# Patient Record
Sex: MALE | Race: WHITE | NOT HISPANIC OR LATINO | Employment: FULL TIME | URBAN - METROPOLITAN AREA
[De-identification: names, ages, dates, MRNs, and addresses within clinical notes are randomized per-mention and may not be internally consistent; named-entity substitution may affect disease eponyms.]

---

## 2017-02-20 ENCOUNTER — TRANSCRIBE ORDERS (OUTPATIENT)
Dept: ADMINISTRATIVE | Facility: HOSPITAL | Age: 62
End: 2017-02-20

## 2017-02-20 DIAGNOSIS — Z71.89 CPAP USE COUNSELING: Primary | ICD-10-CM

## 2017-03-17 ENCOUNTER — HOSPITAL ENCOUNTER (OUTPATIENT)
Dept: SLEEP CENTER | Facility: CLINIC | Age: 62
Discharge: HOME | End: 2017-03-17
Payer: COMMERCIAL

## 2017-03-17 ENCOUNTER — TRANSCRIBE ORDERS (OUTPATIENT)
Dept: SLEEP CENTER | Facility: CLINIC | Age: 62
End: 2017-03-17

## 2017-03-17 DIAGNOSIS — Z71.89 CPAP USE COUNSELING: ICD-10-CM

## 2017-03-17 DIAGNOSIS — G47.33 OSA (OBSTRUCTIVE SLEEP APNEA): Primary | ICD-10-CM

## 2017-03-23 ENCOUNTER — HOSPITAL ENCOUNTER (OUTPATIENT)
Dept: SLEEP CENTER | Facility: CLINIC | Age: 62
Discharge: HOME | End: 2017-03-23
Payer: COMMERCIAL

## 2017-03-23 DIAGNOSIS — G47.33 OSA (OBSTRUCTIVE SLEEP APNEA): ICD-10-CM

## 2017-03-23 PROCEDURE — 95811 POLYSOM 6/>YRS CPAP 4/> PARM: CPT

## 2017-04-03 ENCOUNTER — HOSPITAL ENCOUNTER (OUTPATIENT)
Dept: SLEEP CENTER | Facility: CLINIC | Age: 62
Discharge: HOME | End: 2017-04-03
Payer: COMMERCIAL

## 2017-04-03 DIAGNOSIS — G47.33 OSA (OBSTRUCTIVE SLEEP APNEA): ICD-10-CM

## 2017-05-12 ENCOUNTER — GENERIC CONVERSION - ENCOUNTER (OUTPATIENT)
Dept: OTHER | Facility: OTHER | Age: 62
End: 2017-05-12

## 2017-05-31 ENCOUNTER — ALLSCRIPTS OFFICE VISIT (OUTPATIENT)
Dept: OTHER | Facility: OTHER | Age: 62
End: 2017-05-31

## 2017-06-16 ENCOUNTER — HOSPITAL ENCOUNTER (OUTPATIENT)
Dept: SLEEP CENTER | Facility: CLINIC | Age: 62
Discharge: HOME | End: 2017-06-16
Payer: COMMERCIAL

## 2017-06-16 ENCOUNTER — TRANSCRIBE ORDERS (OUTPATIENT)
Dept: SLEEP CENTER | Facility: CLINIC | Age: 62
End: 2017-06-16

## 2017-06-16 DIAGNOSIS — G47.33 OSA AND COPD OVERLAP SYNDROME (HCC): Primary | ICD-10-CM

## 2017-06-16 DIAGNOSIS — J44.9 OSA AND COPD OVERLAP SYNDROME (HCC): Primary | ICD-10-CM

## 2017-06-16 DIAGNOSIS — G47.33 OSA (OBSTRUCTIVE SLEEP APNEA): ICD-10-CM

## 2017-07-07 ENCOUNTER — GENERIC CONVERSION - ENCOUNTER (OUTPATIENT)
Dept: OTHER | Facility: OTHER | Age: 62
End: 2017-07-07

## 2017-07-07 LAB
A/G RATIO (HISTORICAL): 1.4 (ref 1.2–2.2)
ALBUMIN SERPL BCP-MCNC: 4 G/DL (ref 3.6–4.8)
ALP SERPL-CCNC: 53 IU/L (ref 39–117)
ALT SERPL W P-5'-P-CCNC: 48 IU/L (ref 0–44)
AST SERPL W P-5'-P-CCNC: 32 IU/L (ref 0–40)
BASOPHILS # BLD AUTO: 0 %
BASOPHILS # BLD AUTO: 0 X10E3/UL (ref 0–0.2)
BILIRUB SERPL-MCNC: 0.7 MG/DL (ref 0–1.2)
BUN SERPL-MCNC: 16 MG/DL (ref 8–27)
BUN/CREA RATIO (HISTORICAL): 16 (ref 10–24)
CALCIUM SERPL-MCNC: 9.6 MG/DL (ref 8.6–10.2)
CHLORIDE SERPL-SCNC: 104 MMOL/L (ref 96–106)
CHOLEST SERPL-MCNC: 162 MG/DL (ref 100–199)
CHOLEST/HDLC SERPL: 4.2 RATIO UNITS (ref 0–5)
CO2 SERPL-SCNC: 24 MMOL/L (ref 18–29)
CREAT SERPL-MCNC: 0.98 MG/DL (ref 0.76–1.27)
DEPRECATED RDW RBC AUTO: 13.4 % (ref 12.3–15.4)
EGFR AFRICAN AMERICAN (HISTORICAL): 96 ML/MIN/1.73
EGFR-AMERICAN CALC (HISTORICAL): 83 ML/MIN/1.73
EOSINOPHIL # BLD AUTO: 0.2 X10E3/UL (ref 0–0.4)
EOSINOPHIL # BLD AUTO: 4 %
GLUCOSE SERPL-MCNC: 111 MG/DL (ref 65–99)
HCT VFR BLD AUTO: 41.4 % (ref 37.5–51)
HDLC SERPL-MCNC: 39 MG/DL
HGB BLD-MCNC: 14 G/DL (ref 12.6–17.7)
IMM.GRANULOCYTES (CD4/8) (HISTORICAL): 0 %
IMM.GRANULOCYTES (CD4/8) (HISTORICAL): 0 X10E3/UL (ref 0–0.1)
LDLC SERPL CALC-MCNC: 104 MG/DL (ref 0–99)
LYMPHOCYTES # BLD AUTO: 1.7 X10E3/UL (ref 0.7–3.1)
LYMPHOCYTES # BLD AUTO: 39 %
MCH RBC QN AUTO: 32.1 PG (ref 26.6–33)
MCHC RBC AUTO-ENTMCNC: 33.8 G/DL (ref 31.5–35.7)
MCV RBC AUTO: 95 FL (ref 79–97)
MONOCYTES # BLD AUTO: 0.3 X10E3/UL (ref 0.1–0.9)
MONOCYTES (HISTORICAL): 7 %
NEUTROPHILS # BLD AUTO: 2.2 X10E3/UL (ref 1.4–7)
NEUTROPHILS # BLD AUTO: 50 %
PLATELET # BLD AUTO: 190 X10E3/UL (ref 150–379)
POTASSIUM SERPL-SCNC: 5 MMOL/L (ref 3.5–5.2)
RBC (HISTORICAL): 4.36 X10E6/UL (ref 4.14–5.8)
SODIUM SERPL-SCNC: 145 MMOL/L (ref 134–144)
TOT. GLOBULIN, SERUM (HISTORICAL): 2.8 G/DL (ref 1.5–4.5)
TOTAL PROTEIN (HISTORICAL): 6.8 G/DL (ref 6–8.5)
TRIGL SERPL-MCNC: 93 MG/DL (ref 0–149)
VLDLC SERPL CALC-MCNC: 19 MG/DL (ref 5–40)
WBC # BLD AUTO: 4.4 X10E3/UL (ref 3.4–10.8)

## 2017-07-08 LAB
BACTERIA UR QL AUTO: NORMAL
BILIRUB UR QL STRIP: NEGATIVE
COLOR UR: YELLOW
COMMENT (HISTORICAL): CLEAR
FECAL OCCULT BLOOD DIAGNOSTIC (HISTORICAL): NEGATIVE
GLUCOSE (HISTORICAL): NEGATIVE
KETONES UR STRIP-MCNC: NEGATIVE MG/DL
LEUKOCYTE ESTERASE UR QL STRIP: NEGATIVE
MICROSCOPIC EXAMINATION (HISTORICAL): NORMAL
MICROSCOPIC EXAMINATION (HISTORICAL): NORMAL
MUCUS THREADS (HISTORICAL): PRESENT
NITRITE UR QL STRIP: NEGATIVE
NON-SQ EPI CELLS URNS QL MICRO: NORMAL /HPF
PH UR STRIP.AUTO: 5.5 [PH] (ref 5–7.5)
PROT UR STRIP-MCNC: NEGATIVE MG/DL
RBC (HISTORICAL): NORMAL /HPF
SP GR UR STRIP.AUTO: 1.03 (ref 1–1.03)
TSH SERPL DL<=0.05 MIU/L-ACNC: 2.03 UIU/ML (ref 0.45–4.5)
URINALYSIS (UA) (HISTORICAL): NORMAL
UROBILINOGEN UR QL STRIP.AUTO: 0.2 EU/DL (ref 0.2–1)
WBC # BLD AUTO: NORMAL /HPF

## 2017-07-21 ENCOUNTER — TRANSCRIBE ORDERS (OUTPATIENT)
Dept: SLEEP CENTER | Facility: CLINIC | Age: 62
End: 2017-07-21

## 2017-07-21 ENCOUNTER — HOSPITAL ENCOUNTER (OUTPATIENT)
Dept: SLEEP CENTER | Facility: CLINIC | Age: 62
Discharge: HOME | End: 2017-07-21
Payer: COMMERCIAL

## 2017-07-21 DIAGNOSIS — J44.9 OSA AND COPD OVERLAP SYNDROME (HCC): Primary | ICD-10-CM

## 2017-07-21 DIAGNOSIS — J44.9 OSA AND COPD OVERLAP SYNDROME (HCC): ICD-10-CM

## 2017-07-21 DIAGNOSIS — G47.33 OSA AND COPD OVERLAP SYNDROME (HCC): ICD-10-CM

## 2017-07-21 DIAGNOSIS — G47.33 OSA AND COPD OVERLAP SYNDROME (HCC): Primary | ICD-10-CM

## 2017-07-28 ENCOUNTER — ALLSCRIPTS OFFICE VISIT (OUTPATIENT)
Dept: OTHER | Facility: OTHER | Age: 62
End: 2017-07-28

## 2018-01-13 VITALS
DIASTOLIC BLOOD PRESSURE: 88 MMHG | WEIGHT: 274.5 LBS | SYSTOLIC BLOOD PRESSURE: 140 MMHG | BODY MASS INDEX: 39.3 KG/M2 | HEART RATE: 80 BPM | HEIGHT: 70 IN | TEMPERATURE: 99.6 F

## 2018-01-14 VITALS
SYSTOLIC BLOOD PRESSURE: 130 MMHG | WEIGHT: 275 LBS | HEART RATE: 70 BPM | BODY MASS INDEX: 39.37 KG/M2 | DIASTOLIC BLOOD PRESSURE: 80 MMHG | HEIGHT: 70 IN

## 2018-01-17 NOTE — MISCELLANEOUS
Message   Recorded as Task   Date: 05/08/2017 08:57 AM, Created By: Lloyd Hernandez   Task Name: Med Renewal Request   Assigned To: Moshe Metcalf   Regarding Patient: Amadou Barron, Status: In Progress   Comment:    Althea Crowder - 08 May 2017 8:57 AM     TASK CREATED  Caller: Self; Renew Medication; [phone] (Home); [phone] (Work)  Refill Lisinopril 40 mg at [address] - 08 May 2017 9:52 AM     TASK REASSIGNED: Previously Assigned To Jaleel Jeanroshni him to make another appointment he needs to check in  I will give him a month's worth of medicine   James,April - 08 May 2017 9:56 AM     TASK IN PROGRESS   James,April - 08 May 2017 12:24 PM     TASK EDITED  left messages on both home and cell phone   Moshe Metcalf - 08 May 2017 2:39 PM     TASK EDITED  no answer to home number   Moshe Metcalf - 09 May 2017 8:21 AM     TASK EDITED  left detailed message on home number to call out office back and make an appt within the next month, only 1 month supply of medication was sent to pharmacy  James,April - 12 May 2017 10:43 AM     TASK EDITED  At this time patient has not called back to make an appt, Dr Emmy Briggs was informed of this  Detailed messages have been left on patients home and cell phone  Active Problems    1  Acute bilateral low back pain with right-sided sciatica (724 2,724 3,338 19) (M54 41)   2  Allergic rhinitis (477 9) (J30 9)   3  Encounter for prostate cancer screening (V76 44) (Z12 5)   4  Fatty liver (571 8) (K76 0)   5  GERD without esophagitis (530 81) (K21 9)   6  HTN (hypertension) (401 9) (I10)   7  Obesity (278 00) (E66 9)   8  Osteoarthritis of knee (715 36) (M17 10)   9  Sleep apnea (780 57) (G47 30)    Current Meds   1  Aleve TABS; CHEW OR SWALLOW 1 TABLET DAILY Recorded   2  AmLODIPine Besylate 5 MG Oral Tablet; Take 1 tablet daily; Therapy: 44ZKS4114 to (Evaluate:21Jan2017)  Requested for: 99Lzp7109; Last   Rx:[id number] Ordered   3   LamISIL TABS;   Therapy: (Recorded:11Nov2016) to Recorded   4  Lisinopril 40 MG Oral Tablet; Take 1 tablet daily; Therapy: 28OLX4709 to (Evaluate:61Rar3248)  Requested for: 26KUL2204; Last   [id number] Ordered   5  PriLOSEC OTC 20 MG Oral Tablet Delayed Release; TAKE 1 TABLET DAILY; Therapy: (Recorded:26Apr2013) to Recorded    Allergies    1   No Known Drug Allergies    Signatures   Electronically signed by : LIVIA Adan ; May 12 2017 12:16PM EST                       (Author)

## 2018-01-18 NOTE — MISCELLANEOUS
Message   Recorded as Task   Date: 08/27/2016 09:12 AM, Created By: Nader Andujar   Task Name: Follow Up   Assigned To: Marycarmen Rangel   Regarding Patient: Amanda Zamora, Status: Active   Xena Hernández - 27 Aug 2016 9:12 AM     TASK CREATED  Caller: Self; General Medical Question; [phone] (Home); [phone] (Work)  PT CALLED, HAD BEEN DX BY DR BAIG WITH LYME  IS FINISHED ANTIBIOTIC  STILL HAVING SOME JOINT PAIN (NOT SURE IF LYME OR JUST EVERYDAY ACHES AND PAINS) NOT SURE IF SHOULD STILL BE TAKING ANTIBIOTIC  CAN CALL PT [phone]  USES PROFESSIONAL   Marycarmen Rangel - 29 Aug 2016 7:27 AM     TASK EDITED    I spoke with the patient on 08/27/2016  Dr Roberto Easton had seen him and diagnosed the patient with Lyme disease  He just finished a 3 week course of Doxycycline on 08/20/2016  He did improve and no longer has a rash, but still has diffuse achy joints  There is no fever  There is no joint swelling  There is no headache or neurologic symptoms  I advised him that we will continue him on an additional week of the doycycline as ordered and he should follow up in the office if there is no improvmement  Plan    1  Doxycycline Hyclate 100 MG Oral Capsule;  Take 1 capsule twice daily    Signatures   Electronically signed by : Abbott Goodpasture, DO; Aug 29 2016  7:27AM EST                       (Author)

## 2018-01-30 ENCOUNTER — OFFICE VISIT (OUTPATIENT)
Dept: URGENT CARE | Facility: CLINIC | Age: 63
End: 2018-01-30
Payer: COMMERCIAL

## 2018-01-30 VITALS
HEART RATE: 80 BPM | HEIGHT: 70 IN | DIASTOLIC BLOOD PRESSURE: 80 MMHG | SYSTOLIC BLOOD PRESSURE: 127 MMHG | RESPIRATION RATE: 16 BRPM | BODY MASS INDEX: 40.09 KG/M2 | WEIGHT: 280 LBS | OXYGEN SATURATION: 99 % | TEMPERATURE: 98.3 F

## 2018-01-30 DIAGNOSIS — S39.012A STRAIN OF MUSCLE, FASCIA AND TENDON OF LOWER BACK, INITIAL ENCOUNTER: Primary | ICD-10-CM

## 2018-01-30 PROCEDURE — 99213 OFFICE O/P EST LOW 20 MIN: CPT | Performed by: FAMILY MEDICINE

## 2018-01-30 RX ORDER — AMLODIPINE BESYLATE 5 MG/1
1 TABLET ORAL DAILY
COMMUNITY
Start: 2016-07-25 | End: 2018-09-17 | Stop reason: SDUPTHER

## 2018-01-30 RX ORDER — LISINOPRIL 40 MG/1
1 TABLET ORAL DAILY
COMMUNITY
Start: 2016-07-25 | End: 2018-09-17 | Stop reason: SDUPTHER

## 2018-01-30 RX ORDER — DIFLUNISAL 500 MG/1
500 TABLET, FILM COATED ORAL 2 TIMES DAILY
Qty: 20 TABLET | Refills: 0 | Status: SHIPPED | OUTPATIENT
Start: 2018-01-30 | End: 2019-03-15 | Stop reason: ALTCHOICE

## 2018-01-30 RX ORDER — LORATADINE 10 MG/1
1 TABLET ORAL AS NEEDED
COMMUNITY

## 2018-01-30 RX ORDER — NAPROXEN SODIUM 220 MG
1 TABLET ORAL DAILY
COMMUNITY
End: 2022-01-07 | Stop reason: ALTCHOICE

## 2018-01-30 RX ORDER — METHOCARBAMOL 750 MG/1
750 TABLET, FILM COATED ORAL EVERY 6 HOURS PRN
Qty: 30 TABLET | Refills: 0 | Status: SHIPPED | OUTPATIENT
Start: 2018-01-30 | End: 2019-03-15 | Stop reason: ALTCHOICE

## 2018-01-30 NOTE — PROGRESS NOTES
Assessment/Plan:      Diagnoses and all orders for this visit:    Strain of muscle, fascia and tendon of lower back, initial encounter  -     diflunisal (DOLOBID) 500 mg tablet; Take 1 tablet (500 mg total) by mouth 2 (two) times a day for 10 days  -     methocarbamol (ROBAXIN) 750 mg tablet; Take 1 tablet (750 mg total) by mouth every 6 (six) hours as needed for muscle spasms for up to 10 days    Other orders  -     naproxen sodium (ALEVE) 220 MG tablet; Take 1 tablet by mouth daily  -     amLODIPine (NORVASC) 5 mg tablet; Take 1 tablet by mouth daily  -     loratadine (CLARITIN) 10 mg tablet; Take 1 tablet by mouth daily  -     lisinopril (ZESTRIL) 40 mg tablet; Take 1 tablet by mouth daily          Subjective:     Patient ID: Etienne Bojorquez is a 66 y o  male  Patient is a 26-year-old male who denies prior back injury  However, over the years he occasionally has Back strains spasms  He is here because he was reaching for a pair of socks and suddenly he felt the pain in the mid lower back  There is no radiation of the pain  He has no pain with rotation of his back or lateral bending  However he is very limited in flexion  Back Pain         Review of Systems   Constitutional: Negative  HENT: Negative  Eyes: Negative  Respiratory: Negative  Musculoskeletal: Positive for back pain  Psychiatric/Behavioral: Negative  Objective:     Physical Exam   Constitutional: He appears well-developed and well-nourished  HENT:   Head: Normocephalic and atraumatic  Neck: Normal range of motion  Neck supple  Pulmonary/Chest: Effort normal    Skin: Skin is warm and dry  Psychiatric: He has a normal mood and affect  His behavior is normal      there is no low back tenderness  He is able to rotate as well as lateral back without any significant discomfort  However forward flexion does cause some pain  There is no radiation of the pain

## 2018-09-17 DIAGNOSIS — I10 ESSENTIAL HYPERTENSION: Primary | ICD-10-CM

## 2018-09-17 RX ORDER — AMLODIPINE BESYLATE 5 MG/1
5 TABLET ORAL DAILY
Qty: 90 TABLET | Refills: 1 | Status: SHIPPED | OUTPATIENT
Start: 2018-09-17 | End: 2019-03-15 | Stop reason: SDUPTHER

## 2018-09-17 RX ORDER — LISINOPRIL 40 MG/1
40 TABLET ORAL DAILY
Qty: 90 TABLET | Refills: 1 | Status: SHIPPED | OUTPATIENT
Start: 2018-09-17 | End: 2019-06-15 | Stop reason: SDUPTHER

## 2018-09-17 NOTE — TELEPHONE ENCOUNTER
Patient left prescription request on script line  Last seen July 2017  Left detailed message for patient on number he called from that he needs to schedule an appointment for any more additional refills    9/17/18

## 2019-03-15 ENCOUNTER — APPOINTMENT (OUTPATIENT)
Dept: RADIOLOGY | Facility: CLINIC | Age: 64
End: 2019-03-15
Payer: COMMERCIAL

## 2019-03-15 ENCOUNTER — OFFICE VISIT (OUTPATIENT)
Dept: FAMILY MEDICINE CLINIC | Facility: CLINIC | Age: 64
End: 2019-03-15
Payer: COMMERCIAL

## 2019-03-15 VITALS
DIASTOLIC BLOOD PRESSURE: 88 MMHG | BODY MASS INDEX: 41.49 KG/M2 | WEIGHT: 289.8 LBS | HEIGHT: 70 IN | SYSTOLIC BLOOD PRESSURE: 166 MMHG | OXYGEN SATURATION: 95 % | HEART RATE: 69 BPM | TEMPERATURE: 98.7 F

## 2019-03-15 DIAGNOSIS — M54.2 NECK PAIN: ICD-10-CM

## 2019-03-15 DIAGNOSIS — Z12.5 SCREENING FOR PROSTATE CANCER: ICD-10-CM

## 2019-03-15 DIAGNOSIS — Z13.6 SCREENING FOR CARDIOVASCULAR CONDITION: ICD-10-CM

## 2019-03-15 DIAGNOSIS — Z00.00 ENCOUNTER FOR WELL ADULT EXAM WITHOUT ABNORMAL FINDINGS: Primary | ICD-10-CM

## 2019-03-15 DIAGNOSIS — Z11.59 ENCOUNTER FOR HEPATITIS C SCREENING TEST FOR LOW RISK PATIENT: ICD-10-CM

## 2019-03-15 DIAGNOSIS — Z12.11 SCREENING FOR COLON CANCER: ICD-10-CM

## 2019-03-15 DIAGNOSIS — I10 ESSENTIAL HYPERTENSION: ICD-10-CM

## 2019-03-15 PROCEDURE — 99396 PREV VISIT EST AGE 40-64: CPT | Performed by: FAMILY MEDICINE

## 2019-03-15 PROCEDURE — 72050 X-RAY EXAM NECK SPINE 4/5VWS: CPT

## 2019-03-15 RX ORDER — OMEPRAZOLE 20 MG/1
20 CAPSULE, DELAYED RELEASE ORAL DAILY
COMMUNITY

## 2019-03-15 RX ORDER — AMLODIPINE BESYLATE 5 MG/1
5 TABLET ORAL DAILY
Qty: 90 TABLET | Refills: 1 | Status: SHIPPED | OUTPATIENT
Start: 2019-03-15 | End: 2019-03-15 | Stop reason: SDUPTHER

## 2019-03-15 RX ORDER — AMLODIPINE BESYLATE 10 MG/1
10 TABLET ORAL DAILY
Qty: 90 TABLET | Refills: 1 | Status: SHIPPED | OUTPATIENT
Start: 2019-03-15 | End: 2020-01-20 | Stop reason: SDUPTHER

## 2019-03-15 NOTE — PROGRESS NOTES
Assessment/Plan:     Diagnoses and all orders for this visit:    Encounter for well adult exam without abnormal findings    Neck pain  -     XR spine cervical complete 4 or 5 vw non injury; Future    Essential hypertension  -     Discontinue: amLODIPine (NORVASC) 5 mg tablet; Take 1 tablet (5 mg total) by mouth daily  -     amLODIPine (NORVASC) 10 mg tablet; Take 1 tablet (10 mg total) by mouth daily    Screening for colon cancer  -     Ambulatory referral to Gastroenterology; Future    Screening for cardiovascular condition  -     CBC; Future  -     Comprehensive metabolic panel; Future  -     Lipid panel; Future    Screening for prostate cancer  -     PSA Total (Reflex To Free); Future  -     Urinalysis with reflex to microscopic; Future    Encounter for hepatitis C screening test for low risk patient  -     Hepatitis C antibody; Future    Other orders  -     omeprazole (PriLOSEC) 20 mg delayed release capsule; Take 20 mg by mouth daily        Meds refilled  Labs ordered  Will x-ray cervical spine and offered physical therapy a declined physical therapy at this time but may changes my depending on symptoms and the a results of the x-ray  Discussed his blood pressure and will increase his amlodipine to 10 mg  Also discussed his increasing weight  Discussed diet exercise and how to start reversing his weight gain  Will follow up in 6 months or sooner if needed      Subjective:     Chief Complaint   Patient presents with    Follow-up     new to you, Dr Adelso Walton Patient     Neck Pain     neck pain in back of neck, with tingling radiating around left ear  Pain 2/10        Patient ID: Virgil Caban is a 60 y o  male      Patient is here for a long overdue checkup  He reports having some constipation issues otherwise he is complaining of some neck pain mostly on the left side is had for past few weeks  We also discussed his weight which she is very concerned with his weight continues to gain  He has no other physical complaints today  We blood pressure has been slightly elevated as of recently      The following portions of the patient's history were reviewed and updated as appropriate: allergies, current medications, past family history, past medical history, past social history, past surgical history and problem list     Review of Systems   Constitutional: Negative  HENT: Negative  Eyes: Negative  Respiratory: Negative  Cardiovascular: Negative  Gastrointestinal: Positive for constipation  Endocrine: Negative  Genitourinary: Negative  Musculoskeletal: Positive for neck pain and neck stiffness  Skin: Negative  Allergic/Immunologic: Negative  Neurological: Negative  Hematological: Negative  Psychiatric/Behavioral: Negative  All other systems reviewed and are negative  Objective:    Vitals:    03/15/19 1358   BP: 166/88   BP Location: Left arm   Patient Position: Sitting   Cuff Size: Large   Pulse: 69   Temp: 98 7 °F (37 1 °C)   TempSrc: Tympanic   SpO2: 95%   Weight: 131 kg (289 lb 12 8 oz)   Height: 5' 10" (1 778 m)          Physical Exam   Constitutional: He is oriented to person, place, and time  He appears well-developed and well-nourished  No distress  HENT:   Head: Normocephalic  Right Ear: External ear normal    Left Ear: External ear normal    Nose: Nose normal    Mouth/Throat: Oropharynx is clear and moist    Eyes: Pupils are equal, round, and reactive to light  Conjunctivae and EOM are normal  Right eye exhibits no discharge  Left eye exhibits no discharge  Neck: Normal range of motion  Cardiovascular: Normal rate, regular rhythm and normal heart sounds  Pulmonary/Chest: Effort normal and breath sounds normal    Abdominal: Soft  Bowel sounds are normal  He exhibits no distension  There is no tenderness  Musculoskeletal: Normal range of motion     Very tight neck muscles bilaterally tender to touch   Neurological: He is alert and oriented to person, place, and time  No cranial nerve deficit  Skin: Skin is warm and dry  No rash noted  Psychiatric: He has a normal mood and affect  His behavior is normal  Judgment and thought content normal    Nursing note and vitals reviewed

## 2019-03-18 LAB
ALBUMIN SERPL-MCNC: 4.2 G/DL (ref 3.6–4.8)
ALBUMIN/GLOB SERPL: 1.6 {RATIO} (ref 1.2–2.2)
ALP SERPL-CCNC: 60 IU/L (ref 39–117)
ALT SERPL-CCNC: 62 IU/L (ref 0–44)
APPEARANCE UR: ABNORMAL
AST SERPL-CCNC: 37 IU/L (ref 0–40)
BACTERIA URNS QL MICRO: NORMAL
BASOPHILS # BLD AUTO: 0 X10E3/UL (ref 0–0.2)
BASOPHILS NFR BLD AUTO: 0 %
BILIRUB SERPL-MCNC: 0.7 MG/DL (ref 0–1.2)
BILIRUB UR QL STRIP: NEGATIVE
BUN SERPL-MCNC: 16 MG/DL (ref 8–27)
BUN/CREAT SERPL: 17 (ref 10–24)
CALCIUM SERPL-MCNC: 8.9 MG/DL (ref 8.6–10.2)
CHLORIDE SERPL-SCNC: 107 MMOL/L (ref 96–106)
CHOLEST SERPL-MCNC: 151 MG/DL (ref 100–199)
CO2 SERPL-SCNC: 26 MMOL/L (ref 20–29)
COLOR UR: YELLOW
CREAT SERPL-MCNC: 0.92 MG/DL (ref 0.76–1.27)
EOSINOPHIL # BLD AUTO: 0.2 X10E3/UL (ref 0–0.4)
EOSINOPHIL NFR BLD AUTO: 4 %
EPI CELLS #/AREA URNS HPF: NORMAL /HPF (ref 0–10)
ERYTHROCYTE [DISTWIDTH] IN BLOOD BY AUTOMATED COUNT: 13.7 % (ref 12.3–15.4)
GLOBULIN SER-MCNC: 2.6 G/DL (ref 1.5–4.5)
GLUCOSE SERPL-MCNC: 111 MG/DL (ref 65–99)
GLUCOSE UR QL: NEGATIVE
HCT VFR BLD AUTO: 40.8 % (ref 37.5–51)
HCV AB S/CO SERPL IA: 0.1 S/CO RATIO (ref 0–0.9)
HDLC SERPL-MCNC: 35 MG/DL
HGB BLD-MCNC: 13.4 G/DL (ref 13–17.7)
HGB UR QL STRIP: NEGATIVE
IMM GRANULOCYTES # BLD: 0 X10E3/UL (ref 0–0.1)
IMM GRANULOCYTES NFR BLD: 0 %
KETONES UR QL STRIP: NEGATIVE
LDLC SERPL CALC-MCNC: 99 MG/DL (ref 0–99)
LEUKOCYTE ESTERASE UR QL STRIP: NEGATIVE
LYMPHOCYTES # BLD AUTO: 1.9 X10E3/UL (ref 0.7–3.1)
LYMPHOCYTES NFR BLD AUTO: 42 %
MCH RBC QN AUTO: 32.1 PG (ref 26.6–33)
MCHC RBC AUTO-ENTMCNC: 32.8 G/DL (ref 31.5–35.7)
MCV RBC AUTO: 98 FL (ref 79–97)
MICRO URNS: ABNORMAL
MICRO URNS: ABNORMAL
MONOCYTES # BLD AUTO: 0.5 X10E3/UL (ref 0.1–0.9)
MONOCYTES NFR BLD AUTO: 10 %
MUCOUS THREADS URNS QL MICRO: PRESENT
NEUTROPHILS # BLD AUTO: 1.9 X10E3/UL (ref 1.4–7)
NEUTROPHILS NFR BLD AUTO: 44 %
NITRITE UR QL STRIP: NEGATIVE
PH UR STRIP: 5 [PH] (ref 5–7.5)
PLATELET # BLD AUTO: 169 X10E3/UL (ref 150–379)
POTASSIUM SERPL-SCNC: 4.2 MMOL/L (ref 3.5–5.2)
PROT SERPL-MCNC: 6.8 G/DL (ref 6–8.5)
PROT UR QL STRIP: NEGATIVE
PSA SERPL-MCNC: 0.7 NG/ML (ref 0–4)
RBC # BLD AUTO: 4.18 X10E6/UL (ref 4.14–5.8)
RBC #/AREA URNS HPF: NORMAL /HPF (ref 0–2)
SL AMB EGFR AFRICAN AMERICAN: 103 ML/MIN/1.73
SL AMB EGFR NON AFRICAN AMERICAN: 89 ML/MIN/1.73
SL AMB REFLEX CRITERIA: NORMAL
SL AMB VLDL CHOLESTEROL CALC: 17 MG/DL (ref 5–40)
SODIUM SERPL-SCNC: 146 MMOL/L (ref 134–144)
SP GR UR: 1.03 (ref 1–1.03)
TRIGL SERPL-MCNC: 86 MG/DL (ref 0–149)
UROBILINOGEN UR STRIP-ACNC: 0.2 EU/DL (ref 0.2–1)
WBC # BLD AUTO: 4.5 X10E3/UL (ref 3.4–10.8)
WBC #/AREA URNS HPF: NORMAL /HPF (ref 0–5)

## 2019-05-07 DIAGNOSIS — I10 ESSENTIAL HYPERTENSION: ICD-10-CM

## 2019-05-07 RX ORDER — LISINOPRIL 40 MG/1
TABLET ORAL
Qty: 90 TABLET | Refills: 1 | OUTPATIENT
Start: 2019-05-07

## 2019-06-15 DIAGNOSIS — I10 ESSENTIAL HYPERTENSION: ICD-10-CM

## 2019-06-17 RX ORDER — LISINOPRIL 40 MG/1
40 TABLET ORAL DAILY
Qty: 90 TABLET | Refills: 1 | Status: SHIPPED | OUTPATIENT
Start: 2019-06-17 | End: 2019-11-08 | Stop reason: SDUPTHER

## 2019-09-11 DIAGNOSIS — I10 ESSENTIAL HYPERTENSION: ICD-10-CM

## 2019-09-11 RX ORDER — AMLODIPINE BESYLATE 10 MG/1
TABLET ORAL
Qty: 90 TABLET | Refills: 1 | OUTPATIENT
Start: 2019-09-11

## 2019-09-16 ENCOUNTER — OFFICE VISIT (OUTPATIENT)
Dept: FAMILY MEDICINE CLINIC | Facility: CLINIC | Age: 64
End: 2019-09-16
Payer: COMMERCIAL

## 2019-09-16 VITALS
HEART RATE: 80 BPM | OXYGEN SATURATION: 96 % | TEMPERATURE: 100 F | SYSTOLIC BLOOD PRESSURE: 128 MMHG | HEIGHT: 70 IN | DIASTOLIC BLOOD PRESSURE: 76 MMHG | WEIGHT: 274 LBS | BODY MASS INDEX: 39.22 KG/M2

## 2019-09-16 DIAGNOSIS — S89.91XA INJURY OF RIGHT KNEE, INITIAL ENCOUNTER: Primary | ICD-10-CM

## 2019-09-16 DIAGNOSIS — I10 ESSENTIAL HYPERTENSION: ICD-10-CM

## 2019-09-16 DIAGNOSIS — Z12.11 SCREENING FOR COLON CANCER: ICD-10-CM

## 2019-09-16 DIAGNOSIS — E66.9 OBESITY (BMI 35.0-39.9 WITHOUT COMORBIDITY): ICD-10-CM

## 2019-09-16 PROCEDURE — 99214 OFFICE O/P EST MOD 30 MIN: CPT | Performed by: FAMILY MEDICINE

## 2019-09-16 PROCEDURE — 3008F BODY MASS INDEX DOCD: CPT | Performed by: FAMILY MEDICINE

## 2019-09-16 PROCEDURE — 3078F DIAST BP <80 MM HG: CPT | Performed by: FAMILY MEDICINE

## 2019-09-16 PROCEDURE — 3074F SYST BP LT 130 MM HG: CPT | Performed by: FAMILY MEDICINE

## 2019-09-16 NOTE — PROGRESS NOTES
Assessment/Plan:       Diagnoses and all orders for this visit:    Injury of right knee, initial encounter  -     MRI knee right  wo contrast; Future  -     Ambulatory referral to Orthopedic Surgery; Future    Essential hypertension    Screening for colon cancer  -     Ambulatory referral to Gastroenterology; Future    Obesity (BMI 35 0-39 9 without comorbidity)        Patient has an acute injury of his right knee from activity  This was knee he had surgically repaired few years ago  Any is unable to bear weight  He is unable to have full range of motion  There is tenderness in the posterior area on the lateral side of his knee  He has some crepitus in the knees well  Will order an MRI referred Orthopedics  He is to do conservative measures to them by staying off of it and icing it  Follow-up after the MRI  His blood pressure is controlled today continue current medications    Discussed weight loss diet exercise  Well    Subjective:     Chief Complaint   Patient presents with    Knee Pain     right knee pain         Patient ID: Fernando Naik is a 60 y o  male      Knee pain only concern  Since Friday, was playing golf, issue arose during follow through of swing with twisting motion of knee  Painful with weight bearing, at times with use has feelings like knee about to give, has not had any episodes of knee buckling or colapse  Ice as treatment x 3 days  Some feelings of swelling in posterior knee, none noticed in anterior or lateral portions  Pain most commonly in right posterior and right lateral portions, Friday 9/10 pain with occurrence, Today 7/10 pain, has waxing and waning course,   Previous history arthroscopic surgery to affected knee x 15-20 years ago  Pain less when leg angled, at rest has pain with leg straight 4/10, walking increases pain to 7/10  Some pain in quadriceps muscle but no pain felt at hip or ankle joints        The following portions of the patient's history were reviewed and updated as appropriate: allergies, current medications, past family history, past medical history, past social history, past surgical history and problem list     Review of Systems   Constitutional: Negative  HENT: Negative  Eyes: Negative  Respiratory: Negative  Cardiovascular: Negative  Gastrointestinal: Negative  Endocrine: Negative  Genitourinary: Negative  Musculoskeletal: Positive for arthralgias, gait problem and joint swelling  Skin: Negative  Allergic/Immunologic: Negative  Hematological: Negative  Psychiatric/Behavioral: Negative  All other systems reviewed and are negative  Objective:    Vitals:    09/16/19 1541   BP: 128/76   BP Location: Left arm   Patient Position: Sitting   Cuff Size: Extra-Large   Pulse: 80   Temp: 100 °F (37 8 °C)   TempSrc: Tympanic   SpO2: 96%   Weight: 124 kg (274 lb)   Height: 5' 10" (1 778 m)          Physical Exam   Constitutional: He is oriented to person, place, and time  He appears well-developed and well-nourished  No distress  HENT:   Head: Normocephalic  Right Ear: External ear normal    Left Ear: External ear normal    Nose: Nose normal    Mouth/Throat: Oropharynx is clear and moist    Eyes: Pupils are equal, round, and reactive to light  Conjunctivae and EOM are normal  Right eye exhibits no discharge  Left eye exhibits no discharge  Neck: Normal range of motion  Cardiovascular: Normal rate, regular rhythm and normal heart sounds  Pulmonary/Chest: Effort normal and breath sounds normal    Abdominal: Soft  Bowel sounds are normal  He exhibits no distension  There is no tenderness  Musculoskeletal: Normal range of motion  + decreased rom  Crepitus with flexion  Tenderness to posterior joint line  No obvious ligmaent laxiety  Exam limited due to body habitus  Antalgic gait and unable to bear weight  No obvious ligament laxity   Neurological: He is alert and oriented to person, place, and time  No cranial nerve deficit  Skin: Skin is warm and dry  No rash noted  Psychiatric: He has a normal mood and affect  His behavior is normal  Judgment and thought content normal    Nursing note and vitals reviewed  BMI Counseling: Body mass index is 39 31 kg/m²  The BMI is above normal  Nutrition recommendations include reducing portion sizes, decreasing overall calorie intake, 3-5 servings of fruits/vegetables daily, reducing fast food intake, consuming healthier snacks, decreasing soda and/or juice intake, moderation in carbohydrate intake, increasing intake of lean protein, reducing intake of saturated fat and trans fat and reducing intake of cholesterol  Exercise recommendations include exercising 3-5 times per week

## 2019-09-16 NOTE — PATIENT INSTRUCTIONS
Obesity   AMBULATORY CARE:   Obesity  is when your body mass index (BMI) is greater than 30  Your healthcare provider will use your height and weight to measure your BMI  The risks of obesity include  many health problems, such as injuries or physical disability  You may need tests to check for the following:  · Diabetes     · High blood pressure or high cholesterol     · Heart disease     · Gallbladder or liver disease     · Cancer of the colon, breast, prostate, liver, or kidney     · Sleep apnea     · Arthritis or gout  Seek care immediately if:   · You have a severe headache, confusion, or difficulty speaking  · You have weakness on one side of your body  · You have chest pain, sweating, or shortness of breath  Contact your healthcare provider if:   · You have symptoms of gallbladder or liver disease, such as pain in your upper abdomen  · You have knee or hip pain and discomfort while walking  · You have symptoms of diabetes, such as intense hunger and thirst, and frequent urination  · You have symptoms of sleep apnea, such as snoring or daytime sleepiness  · You have questions or concerns about your condition or care  Treatment for obesity  focuses on helping you lose weight to improve your health  Even a small decrease in BMI can reduce the risk for many health problems  Your healthcare provider will help you set a weight-loss goal   · Lifestyle changes  are the first step in treating obesity  These include making healthy food choices and getting regular physical activity  Your healthcare provider may suggest a weight-loss program that involves coaching, education, and therapy  · Medicine  may help you lose weight when it is used with a healthy diet and physical activity  · Surgery  can help you lose weight if you are very obese and have other health problems  There are several types of weight-loss surgery  Ask your healthcare provider for more information    Be successful losing weight:   · Set small, realistic goals  An example of a small goal is to walk for 20 minutes 5 days a week  Anther goal is to lose 5% of your body weight  · Tell friends, family members, and coworkers about your goals  and ask for their support  Ask a friend to lose weight with you, or join a weight-loss support group  · Identify foods or triggers that may cause you to overeat , and find ways to avoid them  Remove tempting high-calorie foods from your home and workplace  Place a bowl of fresh fruit on your kitchen counter  If stress causes you to eat, then find other ways to cope with stress  · Keep a diary to track what you eat and drink  Also write down how many minutes of physical activity you do each day  Weigh yourself once a week and record it in your diary  Eating changes: You will need to eat 500 to 1,000 fewer calories each day than you currently eat to lose 1 to 2 pounds a week  The following changes will help you cut calories:  · Eat smaller portions  Use small plates, no larger than 9 inches in diameter  Fill your plate half full of fruits and vegetables  Measure your food using measuring cups until you know what a serving size looks like  · Eat 3 meals and 1 or 2 snacks each day  Plan your meals in advance  Clora Mckoy and eat at home most of the time  Eat slowly  · Eat fruits and vegetables at every meal   They are low in calories and high in fiber, which makes you feel full  Do not add butter, margarine, or cream sauce to vegetables  Use herbs to season steamed vegetables  · Eat less fat and fewer fried foods  Eat more baked or grilled chicken and fish  These protein sources are lower in calories and fat than red meat  Limit fast food  Dress your salads with olive oil and vinegar instead of bottled dressing  · Limit the amount of sugar you eat  Do not drink sugary beverages  Limit alcohol  Activity changes:  Physical activity is good for your body in many ways   It helps you burn calories and build strong muscles  It decreases stress and depression, and improves your mood  It can also help you sleep better  Talk to your healthcare provider before you begin an exercise program   · Exercise for at least 30 minutes 5 days a week  Start slowly  Set aside time each day for physical activity that you enjoy and that is convenient for you  It is best to do both weight training and an activity that increases your heart rate, such as walking, bicycling, or swimming  · Find ways to be more active  Do yard work and housecleaning  Walk up the stairs instead of using elevators  Spend your leisure time going to events that require walking, such as outdoor festivals or fairs  This extra physical activity can help you lose weight and keep it off  Follow up with your healthcare provider as directed: You may need to meet with a dietitian  Write down your questions so you remember to ask them during your visits  © 2017 [employer] Information is for End User's use only and may not be sold, redistributed or otherwise used for commercial purposes  All illustrations and images included in CareNotes® are the copyrighted property of A [employer]  or [employer]  The above information is an  only  It is not intended as medical advice for individual conditions or treatments  Talk to your doctor, nurse or pharmacist before following any medical regimen to see if it is safe and effective for you

## 2019-09-20 ENCOUNTER — TELEPHONE (OUTPATIENT)
Dept: FAMILY MEDICINE CLINIC | Facility: CLINIC | Age: 64
End: 2019-09-20

## 2019-09-27 ENCOUNTER — APPOINTMENT (OUTPATIENT)
Dept: RADIOLOGY | Facility: CLINIC | Age: 64
End: 2019-09-27
Payer: COMMERCIAL

## 2019-09-27 ENCOUNTER — CONSULT (OUTPATIENT)
Dept: OBGYN CLINIC | Facility: CLINIC | Age: 64
End: 2019-09-27
Payer: COMMERCIAL

## 2019-09-27 VITALS
SYSTOLIC BLOOD PRESSURE: 130 MMHG | WEIGHT: 274 LBS | DIASTOLIC BLOOD PRESSURE: 80 MMHG | BODY MASS INDEX: 39.22 KG/M2 | HEIGHT: 70 IN

## 2019-09-27 DIAGNOSIS — M17.11 PRIMARY OSTEOARTHRITIS OF RIGHT KNEE: Primary | ICD-10-CM

## 2019-09-27 DIAGNOSIS — M25.562 LEFT KNEE PAIN, UNSPECIFIED CHRONICITY: ICD-10-CM

## 2019-09-27 DIAGNOSIS — S89.91XA INJURY OF RIGHT KNEE, INITIAL ENCOUNTER: ICD-10-CM

## 2019-09-27 PROCEDURE — 99243 OFF/OP CNSLTJ NEW/EST LOW 30: CPT | Performed by: ORTHOPAEDIC SURGERY

## 2019-09-27 PROCEDURE — 73564 X-RAY EXAM KNEE 4 OR MORE: CPT

## 2019-09-27 PROCEDURE — 73562 X-RAY EXAM OF KNEE 3: CPT

## 2019-09-27 PROCEDURE — 20610 DRAIN/INJ JOINT/BURSA W/O US: CPT | Performed by: ORTHOPAEDIC SURGERY

## 2019-09-27 RX ORDER — METHYLPREDNISOLONE ACETATE 40 MG/ML
1 INJECTION, SUSPENSION INTRA-ARTICULAR; INTRALESIONAL; INTRAMUSCULAR; SOFT TISSUE
Status: COMPLETED | OUTPATIENT
Start: 2019-09-27 | End: 2019-09-27

## 2019-09-27 RX ORDER — LIDOCAINE HYDROCHLORIDE 10 MG/ML
3 INJECTION, SOLUTION EPIDURAL; INFILTRATION; INTRACAUDAL; PERINEURAL
Status: COMPLETED | OUTPATIENT
Start: 2019-09-27 | End: 2019-09-27

## 2019-09-27 RX ADMIN — METHYLPREDNISOLONE ACETATE 1 ML: 40 INJECTION, SUSPENSION INTRA-ARTICULAR; INTRALESIONAL; INTRAMUSCULAR; SOFT TISSUE at 08:55

## 2019-09-27 RX ADMIN — LIDOCAINE HYDROCHLORIDE 3 ML: 10 INJECTION, SOLUTION EPIDURAL; INFILTRATION; INTRACAUDAL; PERINEURAL at 08:55

## 2019-11-02 DIAGNOSIS — I10 ESSENTIAL HYPERTENSION: ICD-10-CM

## 2019-11-04 RX ORDER — LISINOPRIL 40 MG/1
TABLET ORAL
Qty: 90 TABLET | Refills: 1 | OUTPATIENT
Start: 2019-11-04

## 2019-11-08 DIAGNOSIS — I10 ESSENTIAL HYPERTENSION: ICD-10-CM

## 2019-11-11 RX ORDER — LISINOPRIL 40 MG/1
40 TABLET ORAL DAILY
Qty: 90 TABLET | Refills: 1 | Status: SHIPPED | OUTPATIENT
Start: 2019-11-11 | End: 2020-01-20 | Stop reason: SDUPTHER

## 2019-11-15 NOTE — TELEPHONE ENCOUNTER
Patient called because he was waiting for his lisinopril to be sent to his [facility] pharmacy and I informed that our records show [facility] was [facility] to [facility] and if there was an issue with insurance or price we can call cancel it and send it to the pharmacy he wishes

## 2019-12-09 ENCOUNTER — TELEPHONE (OUTPATIENT)
Dept: OBGYN CLINIC | Facility: HOSPITAL | Age: 64
End: 2019-12-09

## 2019-12-09 NOTE — TELEPHONE ENCOUNTER
Rio Lyon who said she is a RN with the [employer] called in  # [phone]    She wants to know the patient's PCP and also the patient's phone#  I did not give out any information because I was not aware who was on the phone  I did place her on hold and left a voicemail message to the patient with her name, phone# and why she was calling

## 2020-01-20 DIAGNOSIS — I10 ESSENTIAL HYPERTENSION: ICD-10-CM

## 2020-01-20 RX ORDER — AMLODIPINE BESYLATE 10 MG/1
10 TABLET ORAL DAILY
Qty: 90 TABLET | Refills: 1 | Status: SHIPPED | OUTPATIENT
Start: 2020-01-20 | End: 2020-09-03 | Stop reason: SDUPTHER

## 2020-01-20 RX ORDER — LISINOPRIL 40 MG/1
40 TABLET ORAL DAILY
Qty: 90 TABLET | Refills: 1 | Status: SHIPPED | OUTPATIENT
Start: 2020-01-20 | End: 2020-09-03 | Stop reason: SDUPTHER

## 2020-03-13 ENCOUNTER — OFFICE VISIT (OUTPATIENT)
Dept: FAMILY MEDICINE CLINIC | Facility: CLINIC | Age: 65
End: 2020-03-13
Payer: COMMERCIAL

## 2020-03-13 VITALS
WEIGHT: 287 LBS | BODY MASS INDEX: 40.18 KG/M2 | SYSTOLIC BLOOD PRESSURE: 130 MMHG | DIASTOLIC BLOOD PRESSURE: 80 MMHG | OXYGEN SATURATION: 97 % | TEMPERATURE: 99 F | HEIGHT: 71 IN | HEART RATE: 60 BPM

## 2020-03-13 DIAGNOSIS — G89.29 CHRONIC PAIN OF BOTH KNEES: Primary | ICD-10-CM

## 2020-03-13 DIAGNOSIS — Z11.4 SCREENING FOR HIV (HUMAN IMMUNODEFICIENCY VIRUS): ICD-10-CM

## 2020-03-13 DIAGNOSIS — I10 ESSENTIAL HYPERTENSION: ICD-10-CM

## 2020-03-13 DIAGNOSIS — M25.562 CHRONIC PAIN OF BOTH KNEES: Primary | ICD-10-CM

## 2020-03-13 DIAGNOSIS — Z12.5 SCREENING FOR PROSTATE CANCER: ICD-10-CM

## 2020-03-13 DIAGNOSIS — Z13.6 SCREENING FOR CARDIOVASCULAR CONDITION: ICD-10-CM

## 2020-03-13 DIAGNOSIS — R60.9 EDEMA, UNSPECIFIED TYPE: ICD-10-CM

## 2020-03-13 DIAGNOSIS — Z12.11 SCREENING FOR COLON CANCER: ICD-10-CM

## 2020-03-13 DIAGNOSIS — M25.561 CHRONIC PAIN OF BOTH KNEES: Primary | ICD-10-CM

## 2020-03-13 PROCEDURE — 3075F SYST BP GE 130 - 139MM HG: CPT | Performed by: FAMILY MEDICINE

## 2020-03-13 PROCEDURE — 3079F DIAST BP 80-89 MM HG: CPT | Performed by: FAMILY MEDICINE

## 2020-03-13 PROCEDURE — 1036F TOBACCO NON-USER: CPT | Performed by: FAMILY MEDICINE

## 2020-03-13 PROCEDURE — 99214 OFFICE O/P EST MOD 30 MIN: CPT | Performed by: FAMILY MEDICINE

## 2020-03-13 PROCEDURE — 3008F BODY MASS INDEX DOCD: CPT | Performed by: FAMILY MEDICINE

## 2020-03-13 NOTE — PROGRESS NOTES
Assessment/Plan:     Diagnoses and all orders for this visit:    Chronic pain of both knees  -     Ambulatory referral to Physical Therapy; Future    Edema, unspecified type    Essential hypertension    Screening for HIV (human immunodeficiency virus)  -     [facility]- Human Immunodeficiency Virus 1/2 Antigen / Antibody ( Fourth Generation) with Reflex Testing; Future    Screening for cardiovascular condition  -     CBC and differential; Future  -     Comprehensive metabolic panel; Future  -     Lipid panel; Future  -     UA (URINE) with reflex to Scope    Screening for prostate cancer  -     PSA, Total Screen; Future    Screening for colon cancer  -     Cancel: Ambulatory referral to Gastroenterology; Future  -     Ambulatory referral to Gastroenterology; Future    Other orders  -     Cancel: Human Immunodeficiency Virus 1/2 Antigen / Antibody ( Fourth Generation) with Reflex Testing; Future      patient's edema is likely related to the amlodipine  But is very will continue the medication  Blood pressure is under control today  Labs ordered  For to physical therapy for pain in his knees  Can follow up in      Subjective:     Chief Complaint   Patient presents with    Follow-up     Check up 6 months Hypertension        Patient ID: Brenna Schultz is a 63 y o  male  Patient is here today for six-month checkup chronic conditions  He reports having chronic bilateral pain in his knees  Otherwise he is doing well his blood pressure is much better controlled his only other complaint is some edema in his lower extremities that is intermittent and very mild      The following portions of the patient's history were reviewed and updated as appropriate: allergies, current medications, past family history, past medical history, past social history, past surgical history and problem list     Review of Systems   Constitutional: Negative  HENT: Negative  Eyes: Negative      Respiratory: Negative  Cardiovascular: Positive for leg swelling  Gastrointestinal: Negative  Endocrine: Negative  Genitourinary: Negative  Musculoskeletal: Positive for arthralgias  Skin: Negative  Allergic/Immunologic: Negative  Neurological: Negative  Hematological: Negative  Psychiatric/Behavioral: Negative  All other systems reviewed and are negative  Objective:    Vitals:    03/13/20 0758   BP: 130/80   BP Location: Left arm   Patient Position: Sitting   Cuff Size: Large   Pulse: 60   Temp: 99 °F (37 2 °C)   TempSrc: Tympanic   SpO2: 97%   Weight: 130 kg (287 lb)   Height: 5' 10 5" (1 791 m)          Physical Exam   Constitutional: He is oriented to person, place, and time  He appears well-developed and well-nourished  No distress  HENT:   Head: Normocephalic  Right Ear: External ear normal    Left Ear: External ear normal    Nose: Nose normal    Mouth/Throat: Oropharynx is clear and moist    Eyes: Pupils are equal, round, and reactive to light  Conjunctivae and EOM are normal  Right eye exhibits no discharge  Left eye exhibits no discharge  Neck: Normal range of motion  Cardiovascular: Normal rate, regular rhythm and normal heart sounds  Pulmonary/Chest: Effort normal and breath sounds normal    Abdominal: Soft  Bowel sounds are normal  He exhibits no distension  There is no tenderness  Musculoskeletal: Normal range of motion  Neurological: He is alert and oriented to person, place, and time  No cranial nerve deficit  Skin: Skin is warm and dry  No rash noted  Psychiatric: He has a normal mood and affect  His behavior is normal  Judgment and thought content normal    Nursing note and vitals reviewed

## 2020-03-13 NOTE — PROGRESS NOTES
Assessment/Plan:     Diagnoses and all orders for this visit:    Chronic pain of both knees  -     Ambulatory referral to Physical Therapy; Future            Subjective:     Chief Complaint   Patient presents with    Follow-up     Check up 6 months Hypertension        Patient ID: Zayda Muniz is a 63 y o  male  HPI    The following portions of the patient's history were reviewed and updated as appropriate: allergies, current medications, past family history, past medical history, past social history, past surgical history and problem list     Review of Systems   Constitutional: Negative  HENT: Negative  Eyes: Negative  Respiratory: Negative  Cardiovascular: Negative  Gastrointestinal: Negative  Endocrine: Negative  Genitourinary: Negative  Musculoskeletal: Negative  Skin: Negative  Allergic/Immunologic: Negative  Neurological: Negative  Hematological: Negative  Psychiatric/Behavioral: Negative  All other systems reviewed and are negative  Objective:    Vitals:    03/13/20 0758   BP: 130/80   BP Location: Left arm   Patient Position: Sitting   Cuff Size: Large   Pulse: 60   Temp: 99 °F (37 2 °C)   TempSrc: Tympanic   SpO2: 97%   Weight: 130 kg (287 lb)   Height: 5' 10 5" (1 791 m)          Physical Exam   Constitutional: He is oriented to person, place, and time  He appears well-developed and well-nourished  No distress  HENT:   Head: Normocephalic  Right Ear: External ear normal    Left Ear: External ear normal    Nose: Nose normal    Mouth/Throat: Oropharynx is clear and moist    Eyes: Pupils are equal, round, and reactive to light  Conjunctivae and EOM are normal  Right eye exhibits no discharge  Left eye exhibits no discharge  Neck: Normal range of motion  Cardiovascular: Normal rate, regular rhythm and normal heart sounds  Pulmonary/Chest: Effort normal and breath sounds normal    Abdominal: Soft   Bowel sounds are normal  He exhibits no distension  There is no tenderness  Musculoskeletal: Normal range of motion  Neurological: He is alert and oriented to person, place, and time  No cranial nerve deficit  Skin: Skin is warm and dry  No rash noted  Psychiatric: He has a normal mood and affect  His behavior is normal  Judgment and thought content normal    Nursing note and vitals reviewed

## 2020-06-24 ENCOUNTER — HOSPITAL ENCOUNTER (OUTPATIENT)
Dept: NON INVASIVE DIAGNOSTICS | Age: 65
Discharge: HOME | End: 2020-06-24
Payer: COMMERCIAL

## 2020-06-24 ENCOUNTER — TELEPHONE (OUTPATIENT)
Dept: FAMILY MEDICINE CLINIC | Facility: CLINIC | Age: 65
End: 2020-06-24

## 2020-06-24 ENCOUNTER — OFFICE VISIT (OUTPATIENT)
Dept: FAMILY MEDICINE CLINIC | Facility: CLINIC | Age: 65
End: 2020-06-24
Payer: COMMERCIAL

## 2020-06-24 VITALS
WEIGHT: 285 LBS | DIASTOLIC BLOOD PRESSURE: 80 MMHG | RESPIRATION RATE: 18 BRPM | TEMPERATURE: 98.5 F | HEART RATE: 66 BPM | SYSTOLIC BLOOD PRESSURE: 140 MMHG | HEIGHT: 71 IN | BODY MASS INDEX: 39.9 KG/M2

## 2020-06-24 DIAGNOSIS — M79.89 LEG SWELLING: ICD-10-CM

## 2020-06-24 DIAGNOSIS — M79.89 LEG SWELLING: Primary | ICD-10-CM

## 2020-06-24 PROCEDURE — 99214 OFFICE O/P EST MOD 30 MIN: CPT | Performed by: FAMILY MEDICINE

## 2020-06-24 PROCEDURE — 3077F SYST BP >= 140 MM HG: CPT | Performed by: FAMILY MEDICINE

## 2020-06-24 PROCEDURE — 3008F BODY MASS INDEX DOCD: CPT | Performed by: FAMILY MEDICINE

## 2020-06-24 PROCEDURE — 93970 EXTREMITY STUDY: CPT | Performed by: INTERNAL MEDICINE

## 2020-06-24 PROCEDURE — 1036F TOBACCO NON-USER: CPT | Performed by: FAMILY MEDICINE

## 2020-06-24 PROCEDURE — 93970 EXTREMITY STUDY: CPT

## 2020-06-24 PROCEDURE — 3079F DIAST BP 80-89 MM HG: CPT | Performed by: FAMILY MEDICINE

## 2020-07-07 ENCOUNTER — EVALUATION (OUTPATIENT)
Dept: PHYSICAL THERAPY | Facility: CLINIC | Age: 65
End: 2020-07-07
Payer: COMMERCIAL

## 2020-07-07 DIAGNOSIS — M25.562 CHRONIC PAIN OF BOTH KNEES: Primary | ICD-10-CM

## 2020-07-07 DIAGNOSIS — G89.29 CHRONIC PAIN OF BOTH KNEES: Primary | ICD-10-CM

## 2020-07-07 DIAGNOSIS — M25.561 CHRONIC PAIN OF BOTH KNEES: Primary | ICD-10-CM

## 2020-07-07 PROCEDURE — 97161 PT EVAL LOW COMPLEX 20 MIN: CPT | Performed by: PHYSICAL THERAPIST

## 2020-07-07 PROCEDURE — 97110 THERAPEUTIC EXERCISES: CPT | Performed by: PHYSICAL THERAPIST

## 2020-07-07 NOTE — PROGRESS NOTES
PT Evaluation     Today's date: 2020  Patient name: Ginna Farah  : 1955  MRN: [id number]  Referring provider: Montez Rollins DO  Dx:   Encounter Diagnosis     ICD-10-CM    1  Chronic pain of both knees M25 561 Ambulatory referral to Physical Therapy    M25 562     G89 29                   Assessment  Assessment details: Ginna Farah is a 63 y o  male who presents with pain, decreased strength, decreased ROM, ambulatory dysfunction and postural  dysfunction  Due to these impairments, patient has difficulty performing ADL's, recreational activities, work-related activities, engaging in social activities, ambulation, stair negotiation, lifting/carrying  Patient's clinical presentation is consistent with their referring diagnosis of Chronic pain of both knees  Patient has been educated in home exercise program and plan of care  Patient would benefit from skilled physical therapy services to address their aforementioned functional limitations and progress towards prior level of function and independence with home exercise program      Impairments: abnormal gait, abnormal muscle firing, abnormal or restricted ROM, activity intolerance, impaired physical strength, lacks appropriate home exercise program, pain with function, weight-bearing intolerance, poor posture  and poor body mechanics  Understanding of Dx/Px/POC: good   Prognosis: good    Goals  Short Term Goals: Target Date 30 days  1  Initiate and advance HEP  2  Decrease c/o pain to 4/10 at worst  3  Increase ROM to WNL  4  Increase strength by 1 grade    Long Term Goals: Target Date 60 days  1  Indep with HEP  2  Pt will have < 2/10 pain   3  Increase strength to [facility]  4   Pt will be able to negotiate stairs with out difficulty      Plan  Patient would benefit from: skilled PT  Planned modality interventions: cryotherapy  Planned therapy interventions: joint mobilization, manual therapy, patient education, postural training, activity modification, abdominal trunk stabilization, body mechanics training, flexibility, functional ROM exercises, graded exercise, home exercise program, neuromuscular re-education, strengthening, stretching, therapeutic activities, therapeutic exercise, motor coordination training, muscle pump exercises, gait training, balance/weight bearing training and ADL training  Frequency: 1x week  Duration in weeks: 8  Plan of Care beginning date: 2020  Plan of Care expiration date: 2020  Treatment plan discussed with: patient        Subjective Evaluation    History of Present Illness  Mechanism of injury: Pt notes a chronic hx of B knee pain, but an increase over the past 9+ months  He notes that during this time his activity level has decreased and he has gained weight which he knows is only hurting his knees more  He is on his feet for 10+ hours 4 days per week for work, and has about 2 hours in the car each work day as well  He notes that he has popping and cracking in the knees  Rest improves pain until he goes to get up, and then more activity helps  Pain  Current pain rating: 3  At best pain ratin  At worst pain ratin  Location: B knees  Quality: dull ache, discomfort, tight and throbbing    Patient Goals  Patient goals for therapy: decreased pain, increased motion, independence with ADLs/IADLs, increased strength, return to sport/leisure activities and improved balance          Objective     Static Posture   General Observations  Asymmetrical weight bearing and shifted right       Active Range of Motion   Left Hip   Normal active range of motion    Right Hip   Normal active range of motion  Left Knee   Normal active range of motion    Right Knee   Normal active range of motion    Passive Range of Motion   Left Knee   Flexion: WFL  Prone flexion: 115 degrees   Extension: WFL    Right Knee   Flexion: WFL  Prone flexion: 115 degrees   Extension: [facility]    Strength/Myotome Testing     Left Hip   Planes of Motion   Flexion: 4-  Extension: 3+  Abduction: 3+  Adduction: 4    Right Hip   Planes of Motion   Flexion: 4+  Extension: 3+  Abduction: 4-  Adduction: 4    Left Knee   Flexion: 4-  Extension: 4    Right Knee   Flexion: 4+  Extension: 4+    Tests     Left Hip   Positive Ely's  90/90 SLR: Positive  Right Hip   Positive Ely's  90/90 SLR: Positive  Additional Tests Details  + crepitus with B Knee ext mmt    Ambulation     Observational Gait   Gait: antalgic and asymmetric   Decreased walking speed and stride length  Base of support: increased    Functional Assessment      Squat    Pain, left varus, trunk lean right and right varus       Single Leg Stance   Left: 3 seconds  Right: 7 seconds    General Comments:      Knee Comments  B LE edema      Flowsheet Rows      Most Recent Value   PT/OT G-Codes   Current Score  57   Projected Score  66             Precautions: chronic knee pain      Manuals 7/7                                                                Neuro Re-Ed                                                                                                        Ther Ex                                                                                                                     Ther Activity                                       Gait Training                                       Modalities             HEP DB

## 2020-07-14 ENCOUNTER — OFFICE VISIT (OUTPATIENT)
Dept: PHYSICAL THERAPY | Facility: CLINIC | Age: 65
End: 2020-07-14
Payer: COMMERCIAL

## 2020-07-14 DIAGNOSIS — M25.561 CHRONIC PAIN OF BOTH KNEES: Primary | ICD-10-CM

## 2020-07-14 DIAGNOSIS — M25.562 CHRONIC PAIN OF BOTH KNEES: Primary | ICD-10-CM

## 2020-07-14 DIAGNOSIS — G89.29 CHRONIC PAIN OF BOTH KNEES: Primary | ICD-10-CM

## 2020-07-14 PROCEDURE — 97110 THERAPEUTIC EXERCISES: CPT

## 2020-07-14 PROCEDURE — 97112 NEUROMUSCULAR REEDUCATION: CPT

## 2020-07-14 NOTE — PROGRESS NOTES
Daily Note     Today's date: 2020  Patient name: Luther Davis  : 1955  MRN: [id number]  Referring provider: Stephanie Zamora DO  Dx:   Encounter Diagnosis     ICD-10-CM    1  Chronic pain of both knees M25 561     M25 562     G89 29                   Subjective: pt notes that he has performed HEP 4-5 times since last visit  Notes that he does have difficulty getting off floor after exercises  He states that he went golfing on  and when getting out of cart he twisted knee (L) and had pain but was able to finish the round       Objective: See treatment diary below      Assessment: Reviewed HEP and pt was not performing all exercises and changed hip abd to add in sidelying, accidentally  Corrected all and added as below  Updated HEP      Plan: Continue per plan of care        Precautions: chronic knee pain      Manuals                                                                Neuro Re-Ed             bridges x x10                                                                                         Ther Ex             bike             Quad stretch  20"x3           HSS seated             Calf stretch  20"x3           Hip add squeeze  5"x10           S/l hip add  x10                        SLR  2x10           S/l hip abd  2x10           Hip ext  x10           LAQ  2x10           Ther Activity                                       Gait Training                                       Modalities             HEP DB

## 2020-07-17 DIAGNOSIS — I10 ESSENTIAL HYPERTENSION: ICD-10-CM

## 2020-07-21 ENCOUNTER — OFFICE VISIT (OUTPATIENT)
Dept: PHYSICAL THERAPY | Facility: CLINIC | Age: 65
End: 2020-07-21
Payer: COMMERCIAL

## 2020-07-21 DIAGNOSIS — M25.562 CHRONIC PAIN OF BOTH KNEES: Primary | ICD-10-CM

## 2020-07-21 DIAGNOSIS — M25.561 CHRONIC PAIN OF BOTH KNEES: Primary | ICD-10-CM

## 2020-07-21 DIAGNOSIS — G89.29 CHRONIC PAIN OF BOTH KNEES: Primary | ICD-10-CM

## 2020-07-21 PROCEDURE — 97110 THERAPEUTIC EXERCISES: CPT

## 2020-07-21 PROCEDURE — 97112 NEUROMUSCULAR REEDUCATION: CPT

## 2020-07-21 NOTE — PROGRESS NOTES
Daily Note     Today's date: 2020  Patient name: Bernardo Blanchard  : 1955  MRN: [id number]  Referring provider: Apple Tineo DO  Dx:   Encounter Diagnosis     ICD-10-CM    1  Chronic pain of both knees M25 561     M25 562     G89 29                   Subjective: pt notes that his left knee bothered him on Saturday a lot and called off work  Objective: See treatment diary below      Assessment: Tolerated treatment with faituge but no pain in knees,   Patient was instructed to perform all exercises (both sheets of HEP)not just most recent, pt verbalizes understanding      Plan: Continue per plan of care  Pt will be away next week on vacation       Precautions: chronic knee pain      Manuals                                                               Neuro Re-Ed             bridges x x10 2x10                                                                                        Ther Ex             bike   lv0 5'          Quad stretch  20"x3 20"x5          HSS seated   20"x5          Calf stretch  20"x3 20"x5          Hip add squeeze  5"x10           S/l hip add  x10 2x10                       SLR  2x10 2x10          S/l hip abd  2x10 2x10          Hip ext  x10 2x10          LAQ  2x10 2x10          Ther Activity             Sit to stand   2x10                       Gait Training                                       Modalities             HEP DB

## 2020-07-26 RX ORDER — LISINOPRIL 40 MG/1
TABLET ORAL
Qty: 90 TABLET | Refills: 1 | OUTPATIENT
Start: 2020-07-26

## 2020-07-26 RX ORDER — AMLODIPINE BESYLATE 10 MG/1
TABLET ORAL
Qty: 90 TABLET | Refills: 1 | OUTPATIENT
Start: 2020-07-26

## 2020-07-28 ENCOUNTER — APPOINTMENT (OUTPATIENT)
Dept: PHYSICAL THERAPY | Facility: CLINIC | Age: 65
End: 2020-07-28
Payer: COMMERCIAL

## 2020-08-04 ENCOUNTER — APPOINTMENT (OUTPATIENT)
Dept: PHYSICAL THERAPY | Facility: CLINIC | Age: 65
End: 2020-08-04
Payer: COMMERCIAL

## 2020-08-18 ENCOUNTER — OFFICE VISIT (OUTPATIENT)
Dept: PHYSICAL THERAPY | Facility: CLINIC | Age: 65
End: 2020-08-18
Payer: COMMERCIAL

## 2020-08-18 DIAGNOSIS — M25.562 CHRONIC PAIN OF BOTH KNEES: Primary | ICD-10-CM

## 2020-08-18 DIAGNOSIS — M25.561 CHRONIC PAIN OF BOTH KNEES: Primary | ICD-10-CM

## 2020-08-18 DIAGNOSIS — G89.29 CHRONIC PAIN OF BOTH KNEES: Primary | ICD-10-CM

## 2020-08-18 PROCEDURE — 97140 MANUAL THERAPY 1/> REGIONS: CPT | Performed by: PHYSICAL THERAPIST

## 2020-08-18 PROCEDURE — 97110 THERAPEUTIC EXERCISES: CPT | Performed by: PHYSICAL THERAPIST

## 2020-08-18 NOTE — PROGRESS NOTES
Daily Note     Today's date: 2020  Patient name: Iris Sanchez  : 1955  MRN: [id number]  Referring provider: Libia Ramirez DO  Dx:   Encounter Diagnosis     ICD-10-CM    1  Chronic pain of both knees  M25 561     M25 562     G89 29                   Subjective: pt notes that he feels as if he is making some progress with PT, but that the progress is being negated by the increase amount of standing and walking that he is doing at home  Pt notes that he has a lot of difficulty doing his exercses during the weekends  Objective: See treatment diary below      Assessment: Pt with increased tension in the B quads the released well with tiger tail stm  Left greater tightness than right  Advised pt to trial stm on both quads followed by quad stretch in prone to HEP  Plan: Continue per plan of care        Precautions: chronic knee pain      Manuals          Quadriceps stm tiger tail    DB                                                Neuro Re-Ed             bridges x x10 2x10 x20         Bridge on ball    x20                                                                          Ther Ex             bike   lv0 5' lv 0 5'         Quad stretch  20"x3 20"x5 20''x5 ea         HSS seated   20"x5 20''x3         Calf stretch  20"x3 20"x5 20''x5         Hip add squeeze  5"x10           S/l hip abd  x10 2x10 upside down V 2x10 eA                      SLR  2x10 2x10          S/l hip abd  2x10 2x10          Hip ext  x10 2x10 Bent over 2x10         LAQ  2x10 2x10 5# 2x10 (10 #)        Ther Activity             Sit to stand   2x10                       Gait Training                                       Modalities             HEP DB

## 2020-08-25 ENCOUNTER — APPOINTMENT (OUTPATIENT)
Dept: PHYSICAL THERAPY | Facility: CLINIC | Age: 65
End: 2020-08-25
Payer: COMMERCIAL

## 2020-09-01 ENCOUNTER — OFFICE VISIT (OUTPATIENT)
Dept: PHYSICAL THERAPY | Facility: CLINIC | Age: 65
End: 2020-09-01
Payer: COMMERCIAL

## 2020-09-01 DIAGNOSIS — M25.562 CHRONIC PAIN OF BOTH KNEES: Primary | ICD-10-CM

## 2020-09-01 DIAGNOSIS — G89.29 CHRONIC PAIN OF BOTH KNEES: Primary | ICD-10-CM

## 2020-09-01 DIAGNOSIS — M25.561 CHRONIC PAIN OF BOTH KNEES: Primary | ICD-10-CM

## 2020-09-01 PROCEDURE — 97140 MANUAL THERAPY 1/> REGIONS: CPT

## 2020-09-01 PROCEDURE — 97110 THERAPEUTIC EXERCISES: CPT

## 2020-09-01 PROCEDURE — 97112 NEUROMUSCULAR REEDUCATION: CPT

## 2020-09-01 NOTE — PROGRESS NOTES
Daily Note     Today's date: 2020  Patient name: Carlene Brown  : 1955  MRN: [id number]  Referring provider: Severa Coots, DO  Dx:   Encounter Diagnosis     ICD-10-CM    1  Chronic pain of both knees  M25 561     M25 562     G89 29                   Subjective: notes some soreness noted in legs as they did a lot of walking over weekend as they were at wedding  Also noted increased swelling in legs  Objective: See treatment diary below      Assessment: Tolerated treatment with fatigue noted    Patient was challenged by bridges and s/l hip abd in v form       Plan: Continue per plan of care        Precautions: chronic knee pain      Manuals         Quadriceps stm tiger tail    DB                                                Neuro Re-Ed             bridges x x10 2x10 x20         Bridge on ball    x20                                                                          Ther Ex             bike   lv0 5' lv 0 5' lv0 5'        Quad stretch  20"x3 20"x5 20''x5 ea 20"x5 ea        HSS seated   20"x5 20''x3 20"x5        Calf stretch  20"x3 20"x5 20''x5         Hip add squeeze  5"x10           S/l hip abd  x10 2x10 upside down V 2x10 eA x10 ea                     SLR  2x10 2x10  2x10        S/l hip abd  2x10 2x10          Hip ext  x10 2x10 Bent over 2x10 Bent over 2x10        LAQ  2x10 2x10 5# 2x10 (10 # 2x10        Ther Activity             Sit to stand   2x10                       Gait Training                                       Modalities             HEP DB

## 2020-09-03 DIAGNOSIS — I10 ESSENTIAL HYPERTENSION: ICD-10-CM

## 2020-09-03 RX ORDER — LISINOPRIL 40 MG/1
40 TABLET ORAL DAILY
Qty: 90 TABLET | Refills: 1 | Status: SHIPPED | OUTPATIENT
Start: 2020-09-03 | End: 2021-04-19 | Stop reason: SDUPTHER

## 2020-09-03 RX ORDER — AMLODIPINE BESYLATE 10 MG/1
10 TABLET ORAL DAILY
Qty: 90 TABLET | Refills: 1 | Status: SHIPPED | OUTPATIENT
Start: 2020-09-03 | End: 2021-04-19 | Stop reason: SDUPTHER

## 2020-09-18 ENCOUNTER — OFFICE VISIT (OUTPATIENT)
Dept: FAMILY MEDICINE CLINIC | Facility: CLINIC | Age: 65
End: 2020-09-18
Payer: COMMERCIAL

## 2020-09-18 VITALS
RESPIRATION RATE: 20 BRPM | SYSTOLIC BLOOD PRESSURE: 122 MMHG | TEMPERATURE: 98.7 F | HEIGHT: 71 IN | DIASTOLIC BLOOD PRESSURE: 80 MMHG | WEIGHT: 288.4 LBS | HEART RATE: 72 BPM | OXYGEN SATURATION: 94 % | BODY MASS INDEX: 40.38 KG/M2

## 2020-09-18 DIAGNOSIS — Z23 NEED FOR VACCINATION: ICD-10-CM

## 2020-09-18 DIAGNOSIS — E66.01 MORBID OBESITY WITH BMI OF 40.0-44.9, ADULT (HCC): ICD-10-CM

## 2020-09-18 DIAGNOSIS — Z11.4 SCREENING FOR HIV (HUMAN IMMUNODEFICIENCY VIRUS): ICD-10-CM

## 2020-09-18 DIAGNOSIS — Z12.5 SCREENING FOR PROSTATE CANCER: ICD-10-CM

## 2020-09-18 DIAGNOSIS — Z00.00 ENCOUNTER FOR WELL ADULT EXAM WITHOUT ABNORMAL FINDINGS: Primary | ICD-10-CM

## 2020-09-18 DIAGNOSIS — Z13.6 SCREENING FOR CARDIOVASCULAR CONDITION: ICD-10-CM

## 2020-09-18 PROCEDURE — 3725F SCREEN DEPRESSION PERFORMED: CPT | Performed by: FAMILY MEDICINE

## 2020-09-18 PROCEDURE — 99396 PREV VISIT EST AGE 40-64: CPT | Performed by: FAMILY MEDICINE

## 2020-09-18 PROCEDURE — 90682 RIV4 VACC RECOMBINANT DNA IM: CPT | Performed by: FAMILY MEDICINE

## 2020-09-18 PROCEDURE — 90471 IMMUNIZATION ADMIN: CPT | Performed by: FAMILY MEDICINE

## 2020-09-18 NOTE — PATIENT INSTRUCTIONS
Obesity   AMBULATORY CARE:   Obesity  is when your body mass index (BMI) is greater than 30  Your healthcare provider will use your height and weight to measure your BMI  The risks of obesity include  many health problems, such as injuries or physical disability  You may need tests to check for the following:  · Diabetes     · High blood pressure or high cholesterol     · Heart disease     · Gallbladder or liver disease     · Cancer of the colon, breast, prostate, liver, or kidney     · Sleep apnea     · Arthritis or gout  Seek care immediately if:   · You have a severe headache, confusion, or difficulty speaking  · You have weakness on one side of your body  · You have chest pain, sweating, or shortness of breath  Contact your healthcare provider if:   · You have symptoms of gallbladder or liver disease, such as pain in your upper abdomen  · You have knee or hip pain and discomfort while walking  · You have symptoms of diabetes, such as intense hunger and thirst, and frequent urination  · You have symptoms of sleep apnea, such as snoring or daytime sleepiness  · You have questions or concerns about your condition or care  Treatment for obesity  focuses on helping you lose weight to improve your health  Even a small decrease in BMI can reduce the risk for many health problems  Your healthcare provider will help you set a weight-loss goal   · Lifestyle changes  are the first step in treating obesity  These include making healthy food choices and getting regular physical activity  Your healthcare provider may suggest a weight-loss program that involves coaching, education, and therapy  · Medicine  may help you lose weight when it is used with a healthy diet and physical activity  · Surgery  can help you lose weight if you are very obese and have other health problems  There are several types of weight-loss surgery  Ask your healthcare provider for more information    Be successful losing weight:   · Set small, realistic goals  An example of a small goal is to walk for 20 minutes 5 days a week  Anther goal is to lose 5% of your body weight  · Tell friends, family members, and coworkers about your goals  and ask for their support  Ask a friend to lose weight with you, or join a weight-loss support group  · Identify foods or triggers that may cause you to overeat , and find ways to avoid them  Remove tempting high-calorie foods from your home and workplace  Place a bowl of fresh fruit on your kitchen counter  If stress causes you to eat, then find other ways to cope with stress  · Keep a diary to track what you eat and drink  Also write down how many minutes of physical activity you do each day  Weigh yourself once a week and record it in your diary  Eating changes: You will need to eat 500 to 1,000 fewer calories each day than you currently eat to lose 1 to 2 pounds a week  The following changes will help you cut calories:  · Eat smaller portions  Use small plates, no larger than 9 inches in diameter  Fill your plate half full of fruits and vegetables  Measure your food using measuring cups until you know what a serving size looks like  · Eat 3 meals and 1 or 2 snacks each day  Plan your meals in advance  Kai Rossi and eat at home most of the time  Eat slowly  · Eat fruits and vegetables at every meal   They are low in calories and high in fiber, which makes you feel full  Do not add butter, margarine, or cream sauce to vegetables  Use herbs to season steamed vegetables  · Eat less fat and fewer fried foods  Eat more baked or grilled chicken and fish  These protein sources are lower in calories and fat than red meat  Limit fast food  Dress your salads with olive oil and vinegar instead of bottled dressing  · Limit the amount of sugar you eat  Do not drink sugary beverages  Limit alcohol  Activity changes:  Physical activity is good for your body in many ways   It helps you burn calories and build strong muscles  It decreases stress and depression, and improves your mood  It can also help you sleep better  Talk to your healthcare provider before you begin an exercise program   · Exercise for at least 30 minutes 5 days a week  Start slowly  Set aside time each day for physical activity that you enjoy and that is convenient for you  It is best to do both weight training and an activity that increases your heart rate, such as walking, bicycling, or swimming  · Find ways to be more active  Do yard work and housecleaning  Walk up the stairs instead of using elevators  Spend your leisure time going to events that require walking, such as outdoor festivals or fairs  This extra physical activity can help you lose weight and keep it off  Follow up with your healthcare provider as directed: You may need to meet with a dietitian  Write down your questions so you remember to ask them during your visits  © 2017 [employer] Information is for End User's use only and may not be sold, redistributed or otherwise used for commercial purposes  All illustrations and images included in CareNotes® are the copyrighted property of [employer] , [employer]  or Dennis Rodas  The above information is an  only  It is not intended as medical advice for individual conditions or treatments  Talk to your doctor, nurse or pharmacist before following any medical regimen to see if it is safe and effective for you  Weight Management   AMBULATORY CARE:   Why it is important to manage your weight:  Being overweight increases your risk of health conditions such as heart disease, high blood pressure, type 2 diabetes, and certain types of cancer  It can also increase your risk for osteoarthritis, sleep apnea, and other respiratory problems  Aim for a slow, steady weight loss  Even a small amount of weight loss can lower your risk of health problems    How to lose weight safely:  A safe and healthy way to lose weight is to eat fewer calories and get regular exercise  You can lose up about 1 pound a week by decreasing the number of calories you eat by 500 calories each day  You can decrease calories by eating smaller portion sizes or by cutting out high-calorie foods  Read labels to find out how many calories are in the foods you eat  You can also burn calories with exercise such as walking, swimming, or biking  You will be more likely to keep weight off if you make these changes part of your lifestyle  Healthy meal plan for weight management:  A healthy meal plan includes a variety of foods, contains fewer calories, and helps you stay healthy  A healthy meal plan includes the following:  · Eat whole-grain foods more often  A healthy meal plan should contain fiber  Fiber is the part of grains, fruits, and vegetables that is not broken down by your body  Whole-grain foods are healthy and provide extra fiber in your diet  Some examples of whole-grain foods are whole-wheat breads and pastas, oatmeal, brown rice, and bulgur  · Eat a variety of vegetables every day  Include dark, leafy greens such as spinach, kale, jakob greens, and mustard greens  Eat yellow and orange vegetables such as carrots, sweet potatoes, and winter squash  · Eat a variety of fruits every day  Choose fresh or canned fruit (canned in its own juice or light syrup) instead of juice  Fruit juice has very little or no fiber  · Eat low-fat dairy foods  Drink fat-free (skim) milk or 1% milk  Eat fat-free yogurt and low-fat cottage cheese  Try low-fat cheeses such as mozzarella and other reduced-fat cheeses  · Choose meat and other protein foods that are low in fat  Choose beans or other legumes such as split peas or lentils  Choose fish, skinless poultry (chicken or turkey), or lean cuts of red meat (beef or pork)  Before you cook meat or poultry, cut off any visible fat  · Use less fat and oil  Try baking foods instead of frying them  Add less fat, such as margarine, sour cream, regular salad dressing and mayonnaise to foods  Eat fewer high-fat foods  Some examples of high-fat foods include french fries, doughnuts, ice cream, and cakes  · Eat fewer sweets  Limit foods and drinks that are high in sugar  This includes candy, cookies, regular soda, and sweetened drinks  Ways to decrease calories:   · Eat smaller portions  ¨ Use a small plate with smaller servings  ¨ Do not eat second helpings  ¨ When you eat at a restaurant, ask for a box and place half of your meal in the box before you eat  ¨ Share an entrée with someone else  · Replace high-calorie snacks with healthy, low-calorie snacks  ¨ Choose fresh fruit, vegetables, fat-free rice cakes, or air-popped popcorn instead of potato chips, nuts, or chocolate  ¨ Choose water or calorie-free drinks instead of soda or sweetened drinks  · Eat regular meals  Skipping meals can lead to overeating later in the day  Eat a healthy snack in place of a meal if you do not have time to eat a regular meal      · Do not shop for groceries when you are hungry  You may be more likely to make unhealthy food choices  Take a grocery list of healthy foods and shop after you have eaten  Exercise:  Exercise at least 30 minutes per day on most days of the week  Some examples of exercise include walking, biking, dancing, and swimming  You can also fit in more physical activity by taking the stairs instead of the elevator or parking farther away from stores  Ask your healthcare provider about the best exercise plan for you  Other things to consider as you try to lose weight:   · Be aware of situations that may give you the urge to overeat, such as eating while watching television  Find ways to avoid these situations  For example, read a book, go for a walk, or do crafts      · Meet with a weight loss support group or friends who are also trying to lose weight  This may help you stay motivated to continue working on your weight loss goals  © 2017 [employer] Information is for End User's use only and may not be sold, redistributed or otherwise used for commercial purposes  All illustrations and images included in CareNotes® are the copyrighted property of [employer]  or Dennis Rodas  The above information is an  only  It is not intended as medical advice for individual conditions or treatments  Talk to your doctor, nurse or pharmacist before following any medical regimen to see if it is safe and effective for you

## 2020-09-18 NOTE — PROGRESS NOTES
Assessment/Plan:     Diagnoses and all orders for this visit:    Encounter for well adult exam without abnormal findings    Need for vaccination  -     influenza vaccine, quadrivalent, recombinant, PF, 0 5 mL, for patients 18 yr+ ([facility])    Morbid obesity with BMI of 40 0-44 9, adult ([facility] [address] )    Screening for HIV (human immunodeficiency virus)  -     [facility]- Human Immunodeficiency Virus 1/2 Antigen / Antibody ( Fourth Generation) with Reflex Testing; Future    Screening for prostate cancer  -     UA (URINE) with reflex to Scope; Future  -     PSA Total (Reflex To Free); Future    Screening for cardiovascular condition  -     CBC; Future  -     Comprehensive metabolic panel; Future  -     Lipid panel; Future      flu shot given  Labs reorders  Up to date on health maint  Discussed diet/exercise/weight loss  F/u in 6 months        Subjective:     Chief Complaint   Patient presents with    Follow-up     6 month check up         Patient ID: Iris Sanchez is a 63 y o  male  Patient is here for six-month checkup chronic conditions  He still complaining of chronic knee pain and joint pain  But otherwise is feeling well  Discussed diet exercise and weight loss  He has no other acute complaints today      The following portions of the patient's history were reviewed and updated as appropriate: allergies, current medications, past family history, past medical history, past social history, past surgical history and problem list     Review of Systems   Constitutional: Negative  HENT: Negative  Eyes: Negative  Respiratory: Negative  Cardiovascular: Negative  Gastrointestinal: Negative  Endocrine: Negative  Genitourinary: Negative  Musculoskeletal: Positive for arthralgias  Skin: Negative  Allergic/Immunologic: Negative  Neurological: Negative  Hematological: Negative  Psychiatric/Behavioral: Negative  All other systems reviewed and are negative  Objective:    Vitals:    09/18/20 0835   BP: 122/80   BP Location: Right arm   Patient Position: Sitting   Cuff Size: Large   Pulse: 72   Resp: 20   Temp: 98 7 °F (37 1 °C)   TempSrc: Tympanic   SpO2: 94%   Weight: 131 kg (288 lb 6 4 oz)   Height: 5' 10 5" (1 791 m)          Physical Exam  Vitals signs and nursing note reviewed  Constitutional:       General: He is not in acute distress  Appearance: He is well-developed  HENT:      Head: Normocephalic  Right Ear: External ear normal       Left Ear: External ear normal       Nose: Nose normal    Eyes:      General:         Right eye: No discharge  Left eye: No discharge  Conjunctiva/sclera: Conjunctivae normal       Pupils: Pupils are equal, round, and reactive to light  Neck:      Musculoskeletal: Normal range of motion  Cardiovascular:      Rate and Rhythm: Normal rate and regular rhythm  Heart sounds: Normal heart sounds  Pulmonary:      Effort: Pulmonary effort is normal       Breath sounds: Normal breath sounds  Abdominal:      General: Bowel sounds are normal  There is no distension  Palpations: Abdomen is soft  Tenderness: There is no abdominal tenderness  Musculoskeletal: Normal range of motion  Skin:     General: Skin is warm and dry  Findings: No rash  Neurological:      Mental Status: He is alert and oriented to person, place, and time  Cranial Nerves: No cranial nerve deficit  Psychiatric:         Behavior: Behavior normal          Thought Content: Thought content normal          Judgment: Judgment normal          BMI Counseling: Body mass index is 40 8 kg/m²   The BMI is above normal  Nutrition recommendations include reducing portion sizes, decreasing overall calorie intake, 3-5 servings of fruits/vegetables daily, reducing fast food intake, consuming healthier snacks, decreasing soda and/or juice intake, moderation in carbohydrate intake, increasing intake of lean protein, reducing intake of saturated fat and trans fat and reducing intake of cholesterol  Exercise recommendations include exercising 3-5 times per week

## 2020-10-09 ENCOUNTER — OFFICE VISIT (OUTPATIENT)
Dept: GASTROENTEROLOGY | Facility: CLINIC | Age: 65
End: 2020-10-09
Payer: COMMERCIAL

## 2020-10-09 VITALS
DIASTOLIC BLOOD PRESSURE: 82 MMHG | WEIGHT: 288 LBS | HEART RATE: 54 BPM | TEMPERATURE: 97.1 F | BODY MASS INDEX: 40.32 KG/M2 | SYSTOLIC BLOOD PRESSURE: 138 MMHG | HEIGHT: 71 IN

## 2020-10-09 DIAGNOSIS — R14.0 BLOATING: ICD-10-CM

## 2020-10-09 DIAGNOSIS — R10.10 UPPER ABDOMINAL PAIN: Primary | ICD-10-CM

## 2020-10-09 DIAGNOSIS — Z86.010 PERSONAL HISTORY OF COLONIC POLYPS: ICD-10-CM

## 2020-10-09 DIAGNOSIS — K59.00 CONSTIPATION, UNSPECIFIED CONSTIPATION TYPE: ICD-10-CM

## 2020-10-09 PROBLEM — Z86.0100 PERSONAL HISTORY OF COLONIC POLYPS: Status: ACTIVE | Noted: 2020-10-09

## 2020-10-09 PROCEDURE — 3075F SYST BP GE 130 - 139MM HG: CPT | Performed by: NURSE PRACTITIONER

## 2020-10-09 PROCEDURE — 1036F TOBACCO NON-USER: CPT | Performed by: NURSE PRACTITIONER

## 2020-10-09 PROCEDURE — 3079F DIAST BP 80-89 MM HG: CPT | Performed by: NURSE PRACTITIONER

## 2020-10-09 PROCEDURE — 99203 OFFICE O/P NEW LOW 30 MIN: CPT | Performed by: NURSE PRACTITIONER

## 2020-10-27 ENCOUNTER — EVALUATION (OUTPATIENT)
Dept: PHYSICAL THERAPY | Facility: CLINIC | Age: 65
End: 2020-10-27
Payer: COMMERCIAL

## 2020-10-27 DIAGNOSIS — M17.0 PRIMARY OSTEOARTHRITIS OF BOTH KNEES: Primary | ICD-10-CM

## 2020-10-27 PROCEDURE — 97110 THERAPEUTIC EXERCISES: CPT | Performed by: PHYSICAL THERAPIST

## 2020-10-27 PROCEDURE — 97164 PT RE-EVAL EST PLAN CARE: CPT | Performed by: PHYSICAL THERAPIST

## 2020-10-30 ENCOUNTER — OFFICE VISIT (OUTPATIENT)
Dept: PHYSICAL THERAPY | Facility: CLINIC | Age: 65
End: 2020-10-30
Payer: COMMERCIAL

## 2020-10-30 ENCOUNTER — TELEMEDICINE (OUTPATIENT)
Dept: GASTROENTEROLOGY | Facility: CLINIC | Age: 65
End: 2020-10-30

## 2020-10-30 VITALS — BODY MASS INDEX: 40.32 KG/M2 | HEIGHT: 71 IN | WEIGHT: 288 LBS

## 2020-10-30 DIAGNOSIS — Z86.010 HX OF COLONIC POLYPS: ICD-10-CM

## 2020-10-30 DIAGNOSIS — M17.0 PRIMARY OSTEOARTHRITIS OF BOTH KNEES: Primary | ICD-10-CM

## 2020-10-30 DIAGNOSIS — Z86.010 HX OF COLONIC POLYPS: Primary | ICD-10-CM

## 2020-10-30 PROCEDURE — 3008F BODY MASS INDEX DOCD: CPT | Performed by: NURSE PRACTITIONER

## 2020-10-30 PROCEDURE — 97112 NEUROMUSCULAR REEDUCATION: CPT | Performed by: PHYSICAL THERAPIST

## 2020-10-30 PROCEDURE — 97140 MANUAL THERAPY 1/> REGIONS: CPT | Performed by: PHYSICAL THERAPIST

## 2020-10-30 PROCEDURE — 97110 THERAPEUTIC EXERCISES: CPT | Performed by: PHYSICAL THERAPIST

## 2020-10-30 RX ORDER — POLYETHYLENE GLYCOL 3350, SODIUM SULFATE ANHYDROUS, SODIUM BICARBONATE, SODIUM CHLORIDE, POTASSIUM CHLORIDE 236; 22.74; 6.74; 5.86; 2.97 G/4L; G/4L; G/4L; G/4L; G/4L
POWDER, FOR SOLUTION ORAL
Qty: 2 BOTTLE | Refills: 0 | Status: SHIPPED | OUTPATIENT
Start: 2020-10-30 | End: 2020-11-06 | Stop reason: HOSPADM

## 2020-11-03 ENCOUNTER — OFFICE VISIT (OUTPATIENT)
Dept: PHYSICAL THERAPY | Facility: CLINIC | Age: 65
End: 2020-11-03
Payer: COMMERCIAL

## 2020-11-03 DIAGNOSIS — M17.0 PRIMARY OSTEOARTHRITIS OF BOTH KNEES: Primary | ICD-10-CM

## 2020-11-03 PROCEDURE — 97110 THERAPEUTIC EXERCISES: CPT

## 2020-11-03 PROCEDURE — 97112 NEUROMUSCULAR REEDUCATION: CPT

## 2020-11-06 ENCOUNTER — ANESTHESIA (OUTPATIENT)
Dept: GASTROENTEROLOGY | Facility: AMBULATORY SURGERY CENTER | Age: 65
End: 2020-11-06

## 2020-11-06 ENCOUNTER — HOSPITAL ENCOUNTER (OUTPATIENT)
Dept: GASTROENTEROLOGY | Facility: AMBULATORY SURGERY CENTER | Age: 65
Discharge: HOME | End: 2020-11-06
Payer: COMMERCIAL

## 2020-11-06 ENCOUNTER — ANESTHESIA EVENT (OUTPATIENT)
Dept: GASTROENTEROLOGY | Facility: AMBULATORY SURGERY CENTER | Age: 65
End: 2020-11-06

## 2020-11-06 VITALS
RESPIRATION RATE: 16 BRPM | HEART RATE: 71 BPM | OXYGEN SATURATION: 100 % | DIASTOLIC BLOOD PRESSURE: 90 MMHG | TEMPERATURE: 98.6 F | SYSTOLIC BLOOD PRESSURE: 160 MMHG

## 2020-11-06 VITALS — HEART RATE: 74 BPM

## 2020-11-06 DIAGNOSIS — R10.10 UPPER ABDOMINAL PAIN: ICD-10-CM

## 2020-11-06 DIAGNOSIS — Z86.010 PERSONAL HISTORY OF COLONIC POLYPS: ICD-10-CM

## 2020-11-06 DIAGNOSIS — K59.00 CONSTIPATION, UNSPECIFIED CONSTIPATION TYPE: ICD-10-CM

## 2020-11-06 PROCEDURE — 45378 DIAGNOSTIC COLONOSCOPY: CPT | Performed by: INTERNAL MEDICINE

## 2020-11-06 PROCEDURE — 43239 EGD BIOPSY SINGLE/MULTIPLE: CPT | Performed by: INTERNAL MEDICINE

## 2020-11-06 PROCEDURE — 88305 TISSUE EXAM BY PATHOLOGIST: CPT | Performed by: PATHOLOGY

## 2020-11-06 RX ORDER — PROPOFOL 10 MG/ML
INJECTION, EMULSION INTRAVENOUS AS NEEDED
Status: DISCONTINUED | OUTPATIENT
Start: 2020-11-06 | End: 2020-11-06

## 2020-11-06 RX ORDER — SODIUM CHLORIDE 9 MG/ML
50 INJECTION, SOLUTION INTRAVENOUS CONTINUOUS
Status: DISCONTINUED | OUTPATIENT
Start: 2020-11-06 | End: 2020-11-10 | Stop reason: HOSPADM

## 2020-11-06 RX ORDER — GLYCOPYRROLATE 0.2 MG/ML
INJECTION INTRAMUSCULAR; INTRAVENOUS AS NEEDED
Status: DISCONTINUED | OUTPATIENT
Start: 2020-11-06 | End: 2020-11-06

## 2020-11-06 RX ADMIN — PROPOFOL 150 MG: 10 INJECTION, EMULSION INTRAVENOUS at 11:52

## 2020-11-06 RX ADMIN — PROPOFOL 50 MG: 10 INJECTION, EMULSION INTRAVENOUS at 12:02

## 2020-11-06 RX ADMIN — PROPOFOL 50 MG: 10 INJECTION, EMULSION INTRAVENOUS at 11:56

## 2020-11-06 RX ADMIN — PROPOFOL 50 MG: 10 INJECTION, EMULSION INTRAVENOUS at 12:07

## 2020-11-06 RX ADMIN — SODIUM CHLORIDE 50 ML/HR: 9 INJECTION, SOLUTION INTRAVENOUS at 11:06

## 2020-11-06 RX ADMIN — GLYCOPYRROLATE 0.2 MG: 0.2 INJECTION INTRAMUSCULAR; INTRAVENOUS at 11:50

## 2020-11-17 ENCOUNTER — OFFICE VISIT (OUTPATIENT)
Dept: PHYSICAL THERAPY | Facility: CLINIC | Age: 65
End: 2020-11-17
Payer: COMMERCIAL

## 2020-11-17 DIAGNOSIS — M17.0 PRIMARY OSTEOARTHRITIS OF BOTH KNEES: Primary | ICD-10-CM

## 2020-11-17 PROCEDURE — 97110 THERAPEUTIC EXERCISES: CPT

## 2020-11-17 PROCEDURE — 97112 NEUROMUSCULAR REEDUCATION: CPT

## 2020-11-20 ENCOUNTER — OFFICE VISIT (OUTPATIENT)
Dept: GASTROENTEROLOGY | Facility: CLINIC | Age: 65
End: 2020-11-20
Payer: COMMERCIAL

## 2020-11-20 DIAGNOSIS — R10.10 UPPER ABDOMINAL PAIN: Primary | ICD-10-CM

## 2020-11-20 DIAGNOSIS — R14.0 BLOATING: ICD-10-CM

## 2020-11-20 DIAGNOSIS — K59.09 OTHER CONSTIPATION: ICD-10-CM

## 2020-11-20 DIAGNOSIS — Z86.010 PERSONAL HISTORY OF COLONIC POLYPS: ICD-10-CM

## 2020-11-20 PROCEDURE — 1036F TOBACCO NON-USER: CPT | Performed by: NURSE PRACTITIONER

## 2020-11-20 PROCEDURE — 99213 OFFICE O/P EST LOW 20 MIN: CPT | Performed by: NURSE PRACTITIONER

## 2020-11-23 ENCOUNTER — OFFICE VISIT (OUTPATIENT)
Dept: PHYSICAL THERAPY | Facility: CLINIC | Age: 65
End: 2020-11-23
Payer: COMMERCIAL

## 2020-11-23 DIAGNOSIS — M17.0 PRIMARY OSTEOARTHRITIS OF BOTH KNEES: Primary | ICD-10-CM

## 2020-11-23 PROCEDURE — 97110 THERAPEUTIC EXERCISES: CPT | Performed by: PHYSICAL THERAPIST

## 2020-12-01 ENCOUNTER — APPOINTMENT (OUTPATIENT)
Dept: PHYSICAL THERAPY | Facility: CLINIC | Age: 65
End: 2020-12-01
Payer: COMMERCIAL

## 2020-12-08 ENCOUNTER — OFFICE VISIT (OUTPATIENT)
Dept: PHYSICAL THERAPY | Facility: CLINIC | Age: 65
End: 2020-12-08
Payer: COMMERCIAL

## 2020-12-08 DIAGNOSIS — M17.0 PRIMARY OSTEOARTHRITIS OF BOTH KNEES: Primary | ICD-10-CM

## 2020-12-08 PROCEDURE — 97110 THERAPEUTIC EXERCISES: CPT

## 2020-12-08 PROCEDURE — 97112 NEUROMUSCULAR REEDUCATION: CPT

## 2020-12-15 ENCOUNTER — OFFICE VISIT (OUTPATIENT)
Dept: PHYSICAL THERAPY | Facility: CLINIC | Age: 65
End: 2020-12-15
Payer: COMMERCIAL

## 2020-12-15 DIAGNOSIS — M17.0 PRIMARY OSTEOARTHRITIS OF BOTH KNEES: Primary | ICD-10-CM

## 2020-12-15 PROCEDURE — 97110 THERAPEUTIC EXERCISES: CPT | Performed by: PHYSICAL THERAPIST

## 2020-12-28 ENCOUNTER — OFFICE VISIT (OUTPATIENT)
Dept: PHYSICAL THERAPY | Facility: CLINIC | Age: 65
End: 2020-12-28
Payer: COMMERCIAL

## 2020-12-28 DIAGNOSIS — M17.0 PRIMARY OSTEOARTHRITIS OF BOTH KNEES: Primary | ICD-10-CM

## 2020-12-28 PROCEDURE — 97112 NEUROMUSCULAR REEDUCATION: CPT

## 2020-12-28 PROCEDURE — 97110 THERAPEUTIC EXERCISES: CPT

## 2021-01-05 ENCOUNTER — OFFICE VISIT (OUTPATIENT)
Dept: PHYSICAL THERAPY | Facility: CLINIC | Age: 66
End: 2021-01-05
Payer: COMMERCIAL

## 2021-01-05 DIAGNOSIS — M17.0 PRIMARY OSTEOARTHRITIS OF BOTH KNEES: Primary | ICD-10-CM

## 2021-01-05 PROCEDURE — 97110 THERAPEUTIC EXERCISES: CPT | Performed by: PHYSICAL THERAPIST

## 2021-01-05 PROCEDURE — 97112 NEUROMUSCULAR REEDUCATION: CPT | Performed by: PHYSICAL THERAPIST

## 2021-01-05 NOTE — PROGRESS NOTES
Daily Note     Today's date: 2021  Patient name: Paul Lynne  : 1955  MRN: [id number]  Referring provider: Young Donahue DO  Dx:   Encounter Diagnosis     ICD-10-CM    1  Primary osteoarthritis of both knees  M17 0                   Subjective: Pt notes that his left knee has been bothering him the past week  The right one has been feeling alright  Objective: See treatment diary below      Assessment:pt was able to tolerate increased standing activity with increased weight sets and reps as seen with pain only with lv 3 side step ups on the left knee  Plan: Continue per plan of care           Precautions: chronic knee pain      Manuals 12/8 12/15 12/28 1/5         Quadriceps stm tiger tail             B gastroc stm                                       Neuro Re-Ed             bridges Blue 2x10 Blue 3x10 Blue 3x10 Blue 3x10 (blk)        Bridge on ball nv 3x10 3x10 3x10         Wobble board x20 ea *20 x20 15''x5         tandem 45"x1 ea 45''x2 45"x1 ea 45''x1                                                Ther Ex             bike lv1 8' lv 1 8' lv1 8' lv 1 10'         Quad stretch             HSS seated 20"x5            Calf stretch 20"x5  20"x5 20''x5         Hip add squeeze 5"x20 20''x5           S/l hip abd 2#  2x10 2# 2x10 2#  2x10 2# 2x10         clams Blue 2x10 Blue 3x10 Blue 3x10 Blue 3x10 blk        SLR 2#  2x10 2# 2x15 2#  2x15 2# 2x15         Hip ext 2#  2x10 Hold today           LAQ 2#  2x10 2# 2x15           Prone knee flexion  2# x10 Hold today           Side plank at side of table  30"x2            HR lv1 2x10            Ther Activity             Sit to stand nv  1 foam  x10 1 foam 2x10         Step ups and over nv            Lateral step ups   lv2 x10  lv 3 x10         Partial lunge    x10                                   Modalities

## 2021-01-12 ENCOUNTER — OFFICE VISIT (OUTPATIENT)
Dept: PHYSICAL THERAPY | Facility: CLINIC | Age: 66
End: 2021-01-12
Payer: COMMERCIAL

## 2021-01-12 DIAGNOSIS — M17.0 PRIMARY OSTEOARTHRITIS OF BOTH KNEES: Primary | ICD-10-CM

## 2021-01-12 PROCEDURE — 97110 THERAPEUTIC EXERCISES: CPT

## 2021-01-12 PROCEDURE — 97112 NEUROMUSCULAR REEDUCATION: CPT

## 2021-01-12 NOTE — PROGRESS NOTES
Daily Note     Today's date: 2021  Patient name: La Salvador  : 1955  MRN: [id number]  Referring provider: Ann Bright DO  Dx:   Encounter Diagnosis     ICD-10-CM    1  Primary osteoarthritis of both knees  M17 0                   Subjective: pt no new c/o      Objective: See treatment diary below      Assessment: Tolerated treatment with advancements made with some LLE soreness and hamstring tightness   Patient demonstrated fatigue post treatment      Plan: Continue per plan of care           Precautions: chronic knee pain      Manuals 12/8 12/15 12/28 1/5 1/12        Quadriceps stm tiger tail             B gastroc stm                                       Neuro Re-Ed             bridges Blue 2x10 Blue 3x10 Blue 3x10 Blue 3x10 (blk  2x10        Bridge on ball nv 3x10 3x10 3x10 2x15        Wobble board x20 ea *20 x20 15''x5         tandem 45"x1 ea 45''x2 45"x1 ea 45''x1                                                Ther Ex             bike lv1 8' lv 1 8' lv1 8' lv 1 10' lv1 10'        Quad stretch             HSS seated 20"x5    20"x5        Calf stretch 20"x5  20"x5 20''x5 20"x5        Hip add squeeze 5"x20 20''x5           S/l hip abd 2#  2x10 2# 2x10 2#  2x10 2# 2x10 2 5#  2x10        clams Blue 2x10 Blue 3x10 Blue 3x10 Blue 3x10 blk  2x10        SLR 2#  2x10 2# 2x15 2#  2x15 2# 2x15 2 5#  2x10        Hip ext 2#  2x10 Hold today           LAQ 2#  2x10 2# 2x15           Prone knee flexion  2# x10 Hold today           Side plank at side of table  30"x2            HR lv1 2x10            Ther Activity             Sit to stand nv  1 foam  x10 1 foam 2x10 1 foam 2x10        Step ups and over nv            Lateral step ups   lv2 x10  lv 3 x10 lv3  x10        Partial lunge    x10 x10                                  Modalities
(2) well flexed

## 2021-01-19 ENCOUNTER — OFFICE VISIT (OUTPATIENT)
Dept: PHYSICAL THERAPY | Facility: CLINIC | Age: 66
End: 2021-01-19
Payer: COMMERCIAL

## 2021-01-19 DIAGNOSIS — M17.0 PRIMARY OSTEOARTHRITIS OF BOTH KNEES: Primary | ICD-10-CM

## 2021-01-19 PROCEDURE — 97110 THERAPEUTIC EXERCISES: CPT | Performed by: PHYSICAL THERAPIST

## 2021-01-19 PROCEDURE — 97112 NEUROMUSCULAR REEDUCATION: CPT | Performed by: PHYSICAL THERAPIST

## 2021-01-19 NOTE — PROGRESS NOTES
Daily Note     Today's date: 2021  Patient name: Yvonne Benson  : 1955  MRN: [id number]  Referring provider: Екатерина Zuñiga DO  Dx:   Encounter Diagnosis     ICD-10-CM    1  Primary osteoarthritis of both knees  M17 0                   Subjective: Pt notes that he was able to play 24 holes this past Friday with his braces, but only had fatigue in the knees  Objective: See treatment diary below      Assessment: pt with easily increased fatigue with lateral tap downs today  Increased challenge with ball bridges, and will continue to progress with more standing activity as tolerable  Plan: Continue per plan of care           Precautions: chronic knee pain      Manuals 12/8 12/15 12/28 1/5 1/12 1/19       Quadriceps stm tiger tail             B gastroc stm                                       Neuro Re-Ed             bridges Blue 2x10 Blue 3x10 Blue 3x10 Blue 3x10 (blk  2x10 blk 3x10       Bridge on ball nv 3x10 3x10 3x10 2x15 No hands 3x10       Wobble board x20 ea *20 x20 15''x5         tandem 45"x1 ea 45''x2 45"x1 ea 45''x1         Standing hip abd      Red 2x10 ea                                 Ther Ex             bike lv1 8' lv 1 8' lv1 8' lv 1 10' lv1 10' lv 1 10'       Quad stretch             HSS seated 20"x5    20"x5 20"x5       Calf stretch 20"x5  20"x5 20''x5 20"x5 20"x5       Hip add squeeze 5"x20 20''x5           S/l hip abd 2#  2x10 2# 2x10 2#  2x10 2# 2x10 2 5#  2x10 See above       clams Blue 2x10 Blue 3x10 Blue 3x10 Blue 3x10 blk  2x10 blk 3x10       SLR 2#  2x10 2# 2x15 2#  2x15 2# 2x15 2 5#  2x10 D/c       Hip ext 2#  2x10 Hold today           LAQ 2#  2x10 2# 2x15           Prone knee flexion  2# x10 Hold today           Side plank at side of table  30"x2            HR lv1 2x10            Ther Activity             Sit to stand nv  1 foam  x10 1 foam 2x10 1 foam 2x10 18KB 2x10 foam       Step ups and over nv            Lateral step ups   lv2 x10  lv 3 x10 lv3  x10 lv 2 tap down 2x8       Partial lunge    x10 x10 2x10                                 Modalities

## 2021-01-26 ENCOUNTER — APPOINTMENT (OUTPATIENT)
Dept: PHYSICAL THERAPY | Facility: CLINIC | Age: 66
End: 2021-01-26
Payer: COMMERCIAL

## 2021-02-02 ENCOUNTER — APPOINTMENT (OUTPATIENT)
Dept: PHYSICAL THERAPY | Facility: CLINIC | Age: 66
End: 2021-02-02
Payer: COMMERCIAL

## 2021-02-09 ENCOUNTER — OFFICE VISIT (OUTPATIENT)
Dept: PHYSICAL THERAPY | Facility: CLINIC | Age: 66
End: 2021-02-09
Payer: COMMERCIAL

## 2021-02-09 DIAGNOSIS — M17.0 PRIMARY OSTEOARTHRITIS OF BOTH KNEES: Primary | ICD-10-CM

## 2021-02-09 PROCEDURE — 97164 PT RE-EVAL EST PLAN CARE: CPT | Performed by: PHYSICAL THERAPIST

## 2021-02-09 PROCEDURE — 97110 THERAPEUTIC EXERCISES: CPT | Performed by: PHYSICAL THERAPIST

## 2021-02-09 NOTE — PROGRESS NOTES
PT Re-Evaluation     Today's date: 2021  Patient name: Roberto Carrillo  : 1955  MRN: [id number]  Referring provider: Carley Townsend DO  Dx:   Encounter Diagnosis     ICD-10-CM    1  Primary osteoarthritis of both knees  M17 0                   Assessment  Assessment details: Pt presents to the clinic with continued impaired gait, poor body mechanics, activity intolerance, difficulty with stair negotiation and decreased strength  These impairments cause him difficulty with performing ADLs, during ambulation, work duties, going golfing and with stair negotiation  Pt will continue to benefit from skilled PT services to further address these deficits by advancing the home exercise program to allow progress towards prior level of function  Pt continues to make gains with continued progression from PT, and is hoping to continue with PT in order to avoid any invasive reconstructive surgery  Impairments: abnormal gait, abnormal muscle firing, abnormal or restricted ROM, activity intolerance, impaired physical strength, lacks appropriate home exercise program, pain with function, weight-bearing intolerance, poor posture  and poor body mechanics  Barriers to therapy: GI issues, obesity, chronicity of pain  Understanding of Dx/Px/POC: good   Prognosis: good    Goals  Short Term Goals: Target Date 30 days  1  Initiate and advance HEP - met  2  Decrease c/o pain to 4/10 at worst  3  Increase ROM to WNL - partially met  4  Increase strength by 1 grade - partially met    Long Term Goals: Target Date 60 days  1  Indep with HEP  2  Pt will have < 2/10 pain - not met  3  Increase strength to [facility] - partially met  4   Pt will be able to negotiate stairs with out difficulty -  met      Plan  Patient would benefit from: skilled PT  Planned modality interventions: cryotherapy  Planned therapy interventions: joint mobilization, manual therapy, patient education, postural training, activity modification, abdominal trunk stabilization, body mechanics training, flexibility, functional ROM exercises, graded exercise, home exercise program, neuromuscular re-education, strengthening, stretching, therapeutic activities, therapeutic exercise, motor coordination training, muscle pump exercises, gait training, balance/weight bearing training and ADL training  Frequency: 1x week  Duration in weeks: 10  Plan of Care beginning date: 12/22/2020  Plan of Care expiration date: 03/10/2021  Treatment plan discussed with: patient        Subjective Evaluation    History of Present Illness  Mechanism of injury: Pt notes that his pain levels do continue to decrease in intensity, but his has continued pain most days  When he plays golf he is able to play most days with out his braces  Pt notes that he normally wakes with more pain, and the pain gets better as the day progresses  Pt notes that he is hoping to avoid more injections and also any invasive knee surgery like knee replacements  Also notes that he no longer has any pain in the hips  Pain  Pain scale: pain frequency decreased, pain intensity has not changed  Location: B knees,  Quality: dull ache, discomfort, tight, throbbing and sharp  Aggravating factors: stair climbing, walking and standing  Progression: improved  Pain at worst 4/10    Treatments  Previous treatment: medication and injection treatment (2 aleve in the morning, injections a few years ago (good at first))  Current treatment: medication  Current treatment comments: 1 aleve in the morning  Patient Goals  Patient goals for therapy: decreased pain, increased motion, independence with ADLs/IADLs, increased strength, return to sport/leisure activities and improved balance          Objective     Static Posture   General Observations  symmetrical weight bearing       Active Range of Motion   Left Hip   Normal active range of motion    Right Hip   Normal active range of motion  Left Knee   Normal active range of motion  Prone flexion: WFL  Extension: WFL    Right Knee   Normal active range of motion  Prone flexion: WFL  Extension: WFL  Left Ankle/Foot   Dorsiflexion (ke): WFL  Plantar flexion: WFL    Right Ankle/Foot   Dorsiflexion (ke): WFL  Plantar flexion: WFL    Strength/Myotome Testing     Left Hip   Planes of Motion   Flexion: 4  Extension: 4  Abduction: 4  Adduction: 4+    Right Hip   Planes of Motion   Flexion: 4  Extension: 4  Abduction: 4  Adduction: 4    Left Knee   Flexion: 4+  Extension: 4+    Right Knee   Flexion: 4+  Extension: 4+    Left Ankle/Foot   Dorsiflexion: 4  Plantar flexion: 4    Right Ankle/Foot   Dorsiflexion: 4+  Plantar flexion: 4    Tests     Additional Tests Details  + crepitus with B Knee ext mmt    Ambulation     Ambulation: Stairs   Ascend stairs: independent  Pattern: reciprocal  Railings: one rail  Descend stairs: independent  Pattern: reciprocal  Railings: one rail    Additional Stairs Ambulation Details  No more gait deviations on stairs    Observational Gait   Gait: antalgic   Decreased walking speed and stride length  Left foot contact pattern: foot flat  Right foot contact pattern: foot flat  Base of support: increased    Quality of Movement During Gait     Knee    Knee (Right): Positive increased ER tibial torsion  Functional Assessment      Squat    Pain, left varus, trunk lean right and right varus       Single Leg Stance   Left: 15 seconds  Right: 20 seconds    General Comments:      Knee Comments  B LE edema                  Precautions: chronic knee pain        Manuals 12/8 12/15 12/28 1/5 1/12 1/19 2/9      Quadriceps stm tiger tail             B gastroc stm                                       Neuro Re-Ed             bridges Blue 2x10 Blue 3x10 Blue 3x10 Blue 3x10 (blk  2x10 blk 3x10 blk 3x10      Bridge on ball nv 3x10 3x10 3x10 2x15 No hands 3x10 No hand 3x10      Wobble board x20 ea *20 x20 15''x5         tandem 45"x1 ea 45''x2 45"x1 ea 45''x1         Standing hip abd      Red 2x10 ea Red 2x10 ea + ext                                Ther Ex             bike lv1 8' lv 1 8' lv1 8' lv 1 10' lv1 10' lv 1 10' lv 1 10'      Quad stretch             HSS seated 20"x5    20"x5 20"x5 20''x5      Calf stretch 20"x5  20"x5 20''x5 20"x5 20"x5 20''x5      Hip add squeeze 5"x20 20''x5           S/l hip abd 2#  2x10 2# 2x10 2#  2x10 2# 2x10 2 5#  2x10 See above       clams Blue 2x10 Blue 3x10 Blue 3x10 Blue 3x10 blk  2x10 blk 3x10 blk 3x10      SLR 2#  2x10 2# 2x15 2#  2x15 2# 2x15 2 5#  2x10 D/c       Hip ext 2#  2x10 Hold today           LAQ 2#  2x10 2# 2x15           Prone knee flexion  2# x10 Hold today           Side plank at side of table  30"x2            HR lv1 2x10            Ther Activity             Sit to stand nv  1 foam  x10 1 foam 2x10 1 foam 2x10 18KB 2x10 foam 26kb 2x10 no foam      Step ups and over nv            Lateral step ups   lv2 x10  lv 3 x10 lv3  x10 lv 2 tap down 2x8 lv 2 tap down 2x8      Partial lunge    x10 x10 2x10 2x10                                Modalities

## 2021-02-16 ENCOUNTER — OFFICE VISIT (OUTPATIENT)
Dept: PHYSICAL THERAPY | Facility: CLINIC | Age: 66
End: 2021-02-16
Payer: COMMERCIAL

## 2021-02-16 DIAGNOSIS — M17.0 PRIMARY OSTEOARTHRITIS OF BOTH KNEES: Primary | ICD-10-CM

## 2021-02-16 PROCEDURE — 97112 NEUROMUSCULAR REEDUCATION: CPT

## 2021-02-16 PROCEDURE — 97110 THERAPEUTIC EXERCISES: CPT

## 2021-02-19 ENCOUNTER — TELEPHONE (OUTPATIENT)
Dept: OBGYN CLINIC | Facility: HOSPITAL | Age: 66
End: 2021-02-19

## 2021-02-19 ENCOUNTER — OFFICE VISIT (OUTPATIENT)
Dept: OBGYN CLINIC | Facility: CLINIC | Age: 66
End: 2021-02-19
Payer: COMMERCIAL

## 2021-02-19 VITALS
TEMPERATURE: 98.5 F | HEIGHT: 71 IN | DIASTOLIC BLOOD PRESSURE: 82 MMHG | SYSTOLIC BLOOD PRESSURE: 124 MMHG | BODY MASS INDEX: 40.32 KG/M2 | WEIGHT: 288 LBS

## 2021-02-19 DIAGNOSIS — M17.11 PRIMARY OSTEOARTHRITIS OF RIGHT KNEE: ICD-10-CM

## 2021-02-19 DIAGNOSIS — M17.12 PRIMARY OSTEOARTHRITIS OF LEFT KNEE: Primary | ICD-10-CM

## 2021-02-19 PROCEDURE — 99213 OFFICE O/P EST LOW 20 MIN: CPT | Performed by: ORTHOPAEDIC SURGERY

## 2021-02-19 PROCEDURE — 3008F BODY MASS INDEX DOCD: CPT | Performed by: ORTHOPAEDIC SURGERY

## 2021-02-19 PROCEDURE — 1036F TOBACCO NON-USER: CPT | Performed by: ORTHOPAEDIC SURGERY

## 2021-02-19 NOTE — PROGRESS NOTES
Ortho Sports Medicine Knee Follow Up Visit     Assesment:     61 y o  male bilateral knee moderate patellofemoral joint osteoarthritis with mild medial and lateral joint osteoarthritis  Plan:    Conservative treatment:    Ice to knee for 20 minutes at least 1-2 times daily  Imaging: All imaging from today was reviewed by myself and explained to the patient  Injection:    A viscosupplementation injection was ordered for both knees and will be given at a future visit  Surgery:     No surgery is recommended at this point, continue with conservative treatment plan as noted  Follow up:    No follow-ups on file  Chief Complaint   Patient presents with    Left Knee - Pain    Right Knee - Pain     History of Present Illness: The patient is returns for follow up of left knee as now complaining of right knee pain  Since the prior visit, He reports minimal improvement  Patient has been attending physical therapy for the past 6-7 months with some improvement although he feels like he has maximized his benefit  Patient also had 3 cortisone injections in the knees which have decreased in relief each injection  Patient is here because he would like to try viscosupplementation injections  Pain is located anterior, posterior, medial      Pain is improved by rest, ice, NSAIDS and physical therapy  Pain is aggravated by stairs, squatting, weight bearing, walking, sitting and standing  Symptoms include clicking, catching and popping  The patient has tried rest, ice, NSAIDS, physical therapy and injection            Knee Surgical History:  None    Past Medical, Social and Family History:  Past Medical History:   Diagnosis Date    Colon polyp     CPAP (continuous positive airway pressure) dependence     GERD (gastroesophageal reflux disease)     Hypertension     Internal derangement of knee involving medial meniscus     Resolved 6/5/2015     Lyme disease     Resolved 11/11/2016     Sleep apnea      Past Surgical History:   Procedure Laterality Date    COLONOSCOPY  11/06/2020    All locations appeared normal, no evidence of polyps or masses, small internal hemorrhoids, 5 year recall November 2025    KNEE ARTHROSCOPY Right 1991    UPPER GASTROINTESTINAL ENDOSCOPY  11/06/2020    Normal esophagus, normal stomach (large J-shaped stomach but grossly normal), normal duodenum  Biopsies negative for celiac disease, metaplasia, dysplasia, H pylori, positive for reactive gastropathy  No Known Allergies  Current Outpatient Medications on File Prior to Visit   Medication Sig Dispense Refill    amLODIPine (NORVASC) 10 mg tablet Take 1 tablet (10 mg total) by mouth daily 90 tablet 1    lisinopril (ZESTRIL) 40 mg tablet Take 1 tablet (40 mg total) by mouth daily 90 tablet 1    loratadine (CLARITIN) 10 mg tablet Take 1 tablet by mouth daily      naproxen sodium (ALEVE) 220 MG tablet Take 1 tablet by mouth daily      omeprazole (PriLOSEC) 20 mg delayed release capsule Take 20 mg by mouth daily       No current facility-administered medications on file prior to visit        Social History     Socioeconomic History    Marital status: /Civil Union     Spouse name: Not on file    Number of children: Not on file    Years of education: Not on file    Highest education level: Not on file   Occupational History    Not on file   Social Needs    Financial resource strain: Not hard at all   [employer] insecurity     Worry: Never true     Inability: Never true   [employer] needs     Medical: No     Non-medical: No   Tobacco Use    Smoking status: Never Smoker    Smokeless tobacco: Never Used   Substance and Sexual Activity    Alcohol use: Not Currently    Drug use: No    Sexual activity: Yes     Partners: Female   Lifestyle    Physical activity     Days per week: 0 days     Minutes per session: 0 min    Stress: Not at all   Relationships    Social connections     Talks on phone: More than three times a week     Gets together: Once a week     Attends [employer] service: Never     Active member of club or organization: No     Attends meetings of clubs or organizations: Never     Relationship status:     Intimate partner violence     Fear of current or ex partner: No     Emotionally abused: No     Physically abused: No     Forced sexual activity: No   Other Topics Concern    Not on file   Social History Narrative    Currently working    Denies alcohol use causing problems    Sedentary lifestyle    Social alcohol use - As per [employer]          I have reviewed the past medical, surgical, social and family history, medications and allergies as documented in the EMR  Review of systems: ROS is negative other than that noted in the HPI  Constitutional: Negative for fatigue and fever  Physical Exam:    Blood pressure 124/82, temperature 98 5 °F (36 9 °C), height 5' 10 5" (1 791 m), weight 131 kg (288 lb)  General/Constitutional: NAD, well developed, well nourished  HENT: Normocephalic, atraumatic  CV: Intact distal pulses, regular rate  Resp: No respiratory distress or labored breathing  Lymphatic: No lymphadenopathy palpated  Neuro: Alert and Oriented x 3, no focal deficits  Psych: Normal mood, normal affect, normal judgement, normal behavior  Skin: Warm, dry, no rashes, no erythema    Knee Exam (focused): RIGHT LEFT   ROM:   0-130 0-130   Palpation: Effusion negative negative     MJL tenderness Positive Positive     LJL tenderness Negative Negative   Meniscus:  Ariana Negative Negative    Apley's Compression Negative Negative   Instability: Varus stable stable     Valgus stable stable   Special Tests: Lachman Negative Negative     Posterior drawer Negative Negative     Anterior drawer Negative Negative     Pivot shift not tested not tested     Dial not tested not tested   Patella: Palpation no tenderness no tenderness     Mobility 1/4 1/4     Apprehension Negative Negative Other: Single leg 1/4 squat not tested not tested           LE NV Exam: +2 DP/PT pulses bilaterally  Sensation intact to light touch L2-S1 bilaterally    No calf tenderness to palpation bilaterally      Knee Imaging    Xrays of the bilateral knee from the prior visit were reviewed again with the patient today, with moderate patellofemoral joint osteoarthritis mild medial joint and lateral osteoarthritis  I reviewed the radiology report agree with impression      Scribe Attestation    I,:  Bulmaro Garcia am acting as a scribe while in the presence of the attending physician :       I,:  Zhane Caro, DO personally performed the services described in this documentation    as scribed in my presence :

## 2021-02-19 NOTE — TELEPHONE ENCOUNTER
Patient See Dr Papo Victor    Patient is returning someone's call regarding a phone message regarding his insurance from today's office visit

## 2021-02-23 ENCOUNTER — APPOINTMENT (OUTPATIENT)
Dept: PHYSICAL THERAPY | Facility: CLINIC | Age: 66
End: 2021-02-23
Payer: COMMERCIAL

## 2021-03-19 ENCOUNTER — TELEPHONE (OUTPATIENT)
Dept: OBGYN CLINIC | Facility: HOSPITAL | Age: 66
End: 2021-03-19

## 2021-03-19 NOTE — TELEPHONE ENCOUNTER
Patient sees Dr Indra Lynne Backers called and needed the last office note to be faxed over to them    [facility]  Fax: [phone]

## 2021-03-23 ENCOUNTER — TELEPHONE (OUTPATIENT)
Dept: OBGYN CLINIC | Facility: HOSPITAL | Age: 66
End: 2021-03-23

## 2021-03-23 NOTE — TELEPHONE ENCOUNTER
Called and lvm for patient to call the specialty pharmacy in order to be able to schedule the delivery of the medication

## 2021-03-23 NOTE — TELEPHONE ENCOUNTER
Dr Hong Loco from [address] asked for [address] is pending patient consent    Please reach out to [id number] [address]  # [phone] to proceed with delivery of drug, patient has copay of $50     # [phone]

## 2021-04-12 DIAGNOSIS — I10 ESSENTIAL HYPERTENSION: ICD-10-CM

## 2021-04-12 RX ORDER — LISINOPRIL 40 MG/1
TABLET ORAL
Qty: 90 TABLET | Refills: 1 | OUTPATIENT
Start: 2021-04-12

## 2021-04-12 RX ORDER — AMLODIPINE BESYLATE 10 MG/1
TABLET ORAL
Qty: 90 TABLET | Refills: 1 | OUTPATIENT
Start: 2021-04-12

## 2021-04-16 ENCOUNTER — OFFICE VISIT (OUTPATIENT)
Dept: OBGYN CLINIC | Facility: CLINIC | Age: 66
End: 2021-04-16
Payer: COMMERCIAL

## 2021-04-16 VITALS
HEART RATE: 81 BPM | BODY MASS INDEX: 40.32 KG/M2 | TEMPERATURE: 97.8 F | WEIGHT: 288 LBS | HEIGHT: 71 IN | SYSTOLIC BLOOD PRESSURE: 160 MMHG | DIASTOLIC BLOOD PRESSURE: 89 MMHG

## 2021-04-16 DIAGNOSIS — M17.12 PRIMARY OSTEOARTHRITIS OF LEFT KNEE: ICD-10-CM

## 2021-04-16 DIAGNOSIS — M17.11 PRIMARY OSTEOARTHRITIS OF RIGHT KNEE: Primary | ICD-10-CM

## 2021-04-16 PROCEDURE — 20610 DRAIN/INJ JOINT/BURSA W/O US: CPT | Performed by: PHYSICIAN ASSISTANT

## 2021-04-16 NOTE — PROGRESS NOTES
Ortho Sports Medicine Knee Visco Injection Visit     Assesment:   61 y o  male bilateral knee OA    Plan:    Injection:  The risks and benefits of the injection (which include but are not limited to: infection, bleeding,damage to nerve/artery, need for further intervention), as well as the risks and benefits of all alternative treatments were explained and understood  The patient elected to proceed with injection  The procedure was done with aseptic technique, and the patient tolerated the procedure well with no complications  A Zilretta (extended release corticosteroid) injection was performed to bilateral knees  Ice to the knee 1-2 times daily for 20 minutes, for next 24-48 hrs  Instructed that he should not get his COVID 19 vaccination until 2 weeks from today  Follow up:  Return in about 6 weeks (around 5/28/2021) for Recheck  Chief Complaint   Patient presents with    Left Knee - Injections    Right Knee - Injections       History of Present Illness: The patient is returns for  Bilateral knee Zilretta extended release corticosteroid injection  Since the prior visit, He reports no improvement  Patient denies fevers, chills, and sweats  Denies numbness and tingling  Denies chest pain, shortness of breath, calf pain, and warmth to the knee  I have reviewed the past medical, surgical, social and family history, medications and allergies as documented in the EMR  Review of systems: ROS is negative other than that noted in the HPI  Constitutional: Negative for fatigue and fever  Physical Exam:    Blood pressure 160/89, pulse 81, temperature 97 8 °F (36 6 °C), height 5' 10 5" (1 791 m), weight 131 kg (288 lb)      General/Constitutional: NAD, well developed, well nourished  HENT: Normocephalic, atraumatic  CV: Intact distal pulses, regular rate  Resp: No respiratory distress or labored breathing  Lymphatic: No lymphadenopathy palpated  Neuro: Alert and Oriented x 3, no focal deficits  Psych: Normal mood, normal affect, normal judgement, normal behavior  Skin: Warm, dry, no rashes, no erythema    Large joint arthrocentesis: bilateral knee  Universal Protocol:  Consent given by: patient  Time out: Immediately prior to procedure a "time out" was called to verify the correct patient, procedure, equipment, support staff and site/side marked as required    Timeout called at: 4/16/2021 8:55 AM   Patient understanding: patient states understanding of the procedure being performed  Site marked: the operative site was marked  Patient identity confirmed: verbally with patient    Supporting Documentation  Indications: pain   Procedure Details  Location: knee - bilateral knee  Preparation: Patient was prepped and draped in the usual sterile fashion  Needle size: 22 G  Ultrasound guidance: no  Approach: anterolateral    Medications (Right): 32 mg triamcinolone acetonide 32 MGMedications (Left): 32 mg triamcinolone acetonide 32 MG   Patient tolerance: patient tolerated the procedure well with no immediate complications  Dressing:  Sterile dressing applied

## 2021-04-18 DIAGNOSIS — I10 ESSENTIAL HYPERTENSION: ICD-10-CM

## 2021-04-18 RX ORDER — AMLODIPINE BESYLATE 10 MG/1
10 TABLET ORAL DAILY
Qty: 90 TABLET | Refills: 0 | Status: CANCELLED | OUTPATIENT
Start: 2021-04-18

## 2021-04-18 RX ORDER — LISINOPRIL 40 MG/1
40 TABLET ORAL DAILY
Qty: 90 TABLET | Refills: 0 | Status: CANCELLED | OUTPATIENT
Start: 2021-04-18

## 2021-04-19 DIAGNOSIS — I10 ESSENTIAL HYPERTENSION: ICD-10-CM

## 2021-04-19 RX ORDER — AMLODIPINE BESYLATE 10 MG/1
10 TABLET ORAL DAILY
Qty: 90 TABLET | Refills: 1 | Status: SHIPPED | OUTPATIENT
Start: 2021-04-19 | End: 2021-12-10 | Stop reason: SDUPTHER

## 2021-04-19 RX ORDER — LISINOPRIL 40 MG/1
40 TABLET ORAL DAILY
Qty: 90 TABLET | Refills: 1 | Status: SHIPPED | OUTPATIENT
Start: 2021-04-19 | End: 2021-12-10 | Stop reason: SDUPTHER

## 2021-11-29 ENCOUNTER — OFFICE VISIT (OUTPATIENT)
Dept: OBGYN CLINIC | Facility: MEDICAL CENTER | Age: 66
End: 2021-11-29
Payer: COMMERCIAL

## 2021-11-29 VITALS
SYSTOLIC BLOOD PRESSURE: 154 MMHG | DIASTOLIC BLOOD PRESSURE: 89 MMHG | HEIGHT: 71 IN | WEIGHT: 285 LBS | BODY MASS INDEX: 39.9 KG/M2 | HEART RATE: 84 BPM

## 2021-11-29 DIAGNOSIS — M17.12 PRIMARY OSTEOARTHRITIS OF LEFT KNEE: Primary | ICD-10-CM

## 2021-11-29 DIAGNOSIS — M17.11 PRIMARY OSTEOARTHRITIS OF RIGHT KNEE: ICD-10-CM

## 2021-11-29 PROCEDURE — 1036F TOBACCO NON-USER: CPT | Performed by: ORTHOPAEDIC SURGERY

## 2021-11-29 PROCEDURE — 99213 OFFICE O/P EST LOW 20 MIN: CPT | Performed by: ORTHOPAEDIC SURGERY

## 2021-11-29 PROCEDURE — 3008F BODY MASS INDEX DOCD: CPT | Performed by: ORTHOPAEDIC SURGERY

## 2021-12-03 ENCOUNTER — OFFICE VISIT (OUTPATIENT)
Dept: OBGYN CLINIC | Facility: MEDICAL CENTER | Age: 66
End: 2021-12-03
Payer: COMMERCIAL

## 2021-12-03 ENCOUNTER — APPOINTMENT (OUTPATIENT)
Dept: RADIOLOGY | Facility: MEDICAL CENTER | Age: 66
End: 2021-12-03
Payer: COMMERCIAL

## 2021-12-03 VITALS
SYSTOLIC BLOOD PRESSURE: 147 MMHG | DIASTOLIC BLOOD PRESSURE: 83 MMHG | HEART RATE: 80 BPM | BODY MASS INDEX: 40.52 KG/M2 | WEIGHT: 289.4 LBS | HEIGHT: 71 IN

## 2021-12-03 DIAGNOSIS — M25.562 LEFT KNEE PAIN, UNSPECIFIED CHRONICITY: ICD-10-CM

## 2021-12-03 DIAGNOSIS — M17.11 PRIMARY OSTEOARTHRITIS OF RIGHT KNEE: ICD-10-CM

## 2021-12-03 DIAGNOSIS — M17.0 ARTHRITIS OF BOTH KNEES: Primary | ICD-10-CM

## 2021-12-03 DIAGNOSIS — M17.12 PRIMARY OSTEOARTHRITIS OF LEFT KNEE: ICD-10-CM

## 2021-12-03 DIAGNOSIS — M17.0 ARTHRITIS OF BOTH KNEES: ICD-10-CM

## 2021-12-03 PROCEDURE — 99243 OFF/OP CNSLTJ NEW/EST LOW 30: CPT | Performed by: ORTHOPAEDIC SURGERY

## 2021-12-03 PROCEDURE — 73564 X-RAY EXAM KNEE 4 OR MORE: CPT

## 2021-12-10 ENCOUNTER — OFFICE VISIT (OUTPATIENT)
Dept: FAMILY MEDICINE CLINIC | Facility: CLINIC | Age: 66
End: 2021-12-10
Payer: COMMERCIAL

## 2021-12-10 VITALS
DIASTOLIC BLOOD PRESSURE: 94 MMHG | SYSTOLIC BLOOD PRESSURE: 148 MMHG | TEMPERATURE: 97.7 F | WEIGHT: 287.8 LBS | BODY MASS INDEX: 40.29 KG/M2 | HEART RATE: 72 BPM | OXYGEN SATURATION: 95 % | RESPIRATION RATE: 18 BRPM | HEIGHT: 71 IN

## 2021-12-10 DIAGNOSIS — I10 ESSENTIAL HYPERTENSION: ICD-10-CM

## 2021-12-10 DIAGNOSIS — Z23 NEED FOR INFLUENZA VACCINATION: ICD-10-CM

## 2021-12-10 DIAGNOSIS — Z00.00 ENCOUNTER FOR WELL ADULT EXAM WITHOUT ABNORMAL FINDINGS: Primary | ICD-10-CM

## 2021-12-10 DIAGNOSIS — Z12.5 SCREENING FOR PROSTATE CANCER: ICD-10-CM

## 2021-12-10 DIAGNOSIS — Z79.899 HIGH RISK MEDICATION USE: ICD-10-CM

## 2021-12-10 DIAGNOSIS — E78.5 HYPERLIPIDEMIA, UNSPECIFIED HYPERLIPIDEMIA TYPE: ICD-10-CM

## 2021-12-10 DIAGNOSIS — Z13.6 SCREENING FOR CARDIOVASCULAR CONDITION: ICD-10-CM

## 2021-12-10 PROCEDURE — 90682 RIV4 VACC RECOMBINANT DNA IM: CPT | Performed by: FAMILY MEDICINE

## 2021-12-10 PROCEDURE — 90471 IMMUNIZATION ADMIN: CPT | Performed by: FAMILY MEDICINE

## 2021-12-10 PROCEDURE — 99396 PREV VISIT EST AGE 40-64: CPT | Performed by: FAMILY MEDICINE

## 2021-12-10 PROCEDURE — 1101F PT FALLS ASSESS-DOCD LE1/YR: CPT | Performed by: FAMILY MEDICINE

## 2021-12-10 PROCEDURE — 3725F SCREEN DEPRESSION PERFORMED: CPT | Performed by: FAMILY MEDICINE

## 2021-12-10 PROCEDURE — 1036F TOBACCO NON-USER: CPT | Performed by: FAMILY MEDICINE

## 2021-12-10 PROCEDURE — 3008F BODY MASS INDEX DOCD: CPT | Performed by: FAMILY MEDICINE

## 2021-12-10 RX ORDER — AMLODIPINE BESYLATE 10 MG/1
10 TABLET ORAL DAILY
Qty: 90 TABLET | Refills: 1 | Status: SHIPPED | OUTPATIENT
Start: 2021-12-10 | End: 2022-06-13

## 2021-12-10 RX ORDER — LISINOPRIL 40 MG/1
40 TABLET ORAL DAILY
Qty: 90 TABLET | Refills: 1 | Status: SHIPPED | OUTPATIENT
Start: 2021-12-10 | End: 2022-06-13

## 2021-12-22 ENCOUNTER — HOSPITAL ENCOUNTER (OUTPATIENT)
Dept: MRI IMAGING | Facility: HOSPITAL | Age: 66
Discharge: HOME | End: 2021-12-22
Payer: COMMERCIAL

## 2021-12-22 DIAGNOSIS — M25.562 LEFT KNEE PAIN, UNSPECIFIED CHRONICITY: ICD-10-CM

## 2021-12-22 DIAGNOSIS — M17.12 PRIMARY OSTEOARTHRITIS OF LEFT KNEE: ICD-10-CM

## 2021-12-22 PROCEDURE — 73721 MRI JNT OF LWR EXTRE W/O DYE: CPT

## 2021-12-22 PROCEDURE — G1004 CDSM NDSC: HCPCS

## 2021-12-24 LAB
ALBUMIN SERPL-MCNC: 4.2 G/DL (ref 3.8–4.8)
ALBUMIN/GLOB SERPL: 1.5 {RATIO} (ref 1.2–2.2)
ALP SERPL-CCNC: 69 IU/L (ref 44–121)
ALT SERPL-CCNC: 86 IU/L (ref 0–44)
APPEARANCE UR: CLEAR
AST SERPL-CCNC: 53 IU/L (ref 0–40)
BILIRUB SERPL-MCNC: 0.5 MG/DL (ref 0–1.2)
BILIRUB UR QL STRIP: NEGATIVE
BUN SERPL-MCNC: 15 MG/DL (ref 8–27)
BUN/CREAT SERPL: 17 (ref 10–24)
CALCIUM SERPL-MCNC: 9.1 MG/DL (ref 8.6–10.2)
CHLORIDE SERPL-SCNC: 101 MMOL/L (ref 96–106)
CHOLEST SERPL-MCNC: 191 MG/DL (ref 100–199)
CHOLEST/HDLC SERPL: 5.2 RATIO (ref 0–5)
CO2 SERPL-SCNC: 28 MMOL/L (ref 20–29)
COLOR UR: YELLOW
CREAT SERPL-MCNC: 0.87 MG/DL (ref 0.76–1.27)
ERYTHROCYTE [DISTWIDTH] IN BLOOD BY AUTOMATED COUNT: 12.2 % (ref 11.6–15.4)
GLOBULIN SER-MCNC: 2.8 G/DL (ref 1.5–4.5)
GLUCOSE SERPL-MCNC: 141 MG/DL (ref 65–99)
GLUCOSE UR QL: NEGATIVE
HCT VFR BLD AUTO: 43.9 % (ref 37.5–51)
HDLC SERPL-MCNC: 37 MG/DL
HGB BLD-MCNC: 15.2 G/DL (ref 13–17.7)
HGB UR QL STRIP: NEGATIVE
KETONES UR QL STRIP: NEGATIVE
LDLC SERPL CALC-MCNC: 129 MG/DL (ref 0–99)
LEUKOCYTE ESTERASE UR QL STRIP: NEGATIVE
MCH RBC QN AUTO: 33.1 PG (ref 26.6–33)
MCHC RBC AUTO-ENTMCNC: 34.6 G/DL (ref 31.5–35.7)
MCV RBC AUTO: 96 FL (ref 79–97)
MICRO URNS: NORMAL
NITRITE UR QL STRIP: NEGATIVE
PH UR STRIP: 6 [PH] (ref 5–7.5)
PLATELET # BLD AUTO: 196 X10E3/UL (ref 150–450)
POTASSIUM SERPL-SCNC: 4.4 MMOL/L (ref 3.5–5.2)
PROT SERPL-MCNC: 7 G/DL (ref 6–8.5)
PROT UR QL STRIP: NEGATIVE
PSA SERPL-MCNC: 0.4 NG/ML (ref 0–4)
RBC # BLD AUTO: 4.59 X10E6/UL (ref 4.14–5.8)
SL AMB EGFR AFRICAN AMERICAN: 105 ML/MIN/1.73
SL AMB EGFR NON AFRICAN AMERICAN: 91 ML/MIN/1.73
SL AMB REFLEX CRITERIA: NORMAL
SL AMB VLDL CHOLESTEROL CALC: 25 MG/DL (ref 5–40)
SODIUM SERPL-SCNC: 145 MMOL/L (ref 134–144)
SP GR UR: 1.02 (ref 1–1.03)
TRIGL SERPL-MCNC: 141 MG/DL (ref 0–149)
UROBILINOGEN UR STRIP-ACNC: 1 MG/DL (ref 0.2–1)
WBC # BLD AUTO: 5.3 X10E3/UL (ref 3.4–10.8)

## 2022-01-07 ENCOUNTER — OFFICE VISIT (OUTPATIENT)
Dept: FAMILY MEDICINE CLINIC | Facility: CLINIC | Age: 67
End: 2022-01-07
Payer: COMMERCIAL

## 2022-01-07 ENCOUNTER — OFFICE VISIT (OUTPATIENT)
Dept: OBGYN CLINIC | Facility: MEDICAL CENTER | Age: 67
End: 2022-01-07
Payer: COMMERCIAL

## 2022-01-07 VITALS
HEART RATE: 69 BPM | TEMPERATURE: 98.2 F | SYSTOLIC BLOOD PRESSURE: 136 MMHG | RESPIRATION RATE: 22 BRPM | WEIGHT: 288.8 LBS | OXYGEN SATURATION: 96 % | HEIGHT: 71 IN | DIASTOLIC BLOOD PRESSURE: 80 MMHG | BODY MASS INDEX: 40.43 KG/M2

## 2022-01-07 VITALS
TEMPERATURE: 97.7 F | SYSTOLIC BLOOD PRESSURE: 147 MMHG | DIASTOLIC BLOOD PRESSURE: 77 MMHG | BODY MASS INDEX: 40.35 KG/M2 | HEART RATE: 76 BPM | HEIGHT: 71 IN | WEIGHT: 288.2 LBS

## 2022-01-07 DIAGNOSIS — R74.8 ELEVATED LIVER ENZYMES: ICD-10-CM

## 2022-01-07 DIAGNOSIS — Z01.818 PREOPERATIVE TESTING: ICD-10-CM

## 2022-01-07 DIAGNOSIS — S83.242A OTHER TEAR OF MEDIAL MENISCUS, CURRENT INJURY, LEFT KNEE, INITIAL ENCOUNTER: ICD-10-CM

## 2022-01-07 DIAGNOSIS — M17.12 PRIMARY OSTEOARTHRITIS OF LEFT KNEE: Primary | ICD-10-CM

## 2022-01-07 DIAGNOSIS — R73.01 IMPAIRED FASTING GLUCOSE: Primary | ICD-10-CM

## 2022-01-07 DIAGNOSIS — M17.0 PRIMARY OSTEOARTHRITIS OF BOTH KNEES: ICD-10-CM

## 2022-01-07 PROCEDURE — 99214 OFFICE O/P EST MOD 30 MIN: CPT | Performed by: FAMILY MEDICINE

## 2022-01-07 PROCEDURE — 99213 OFFICE O/P EST LOW 20 MIN: CPT | Performed by: ORTHOPAEDIC SURGERY

## 2022-01-07 RX ORDER — CHLORHEXIDINE GLUCONATE 0.12 MG/ML
15 RINSE ORAL ONCE
Status: CANCELLED | OUTPATIENT
Start: 2022-02-22 | End: 2022-01-07

## 2022-01-07 RX ORDER — CEFAZOLIN SODIUM 2 G/50ML
2000 SOLUTION INTRAVENOUS ONCE
Status: CANCELLED | OUTPATIENT
Start: 2022-02-22 | End: 2022-01-07

## 2022-01-07 NOTE — PROGRESS NOTES
Assessment/Plan:         Diagnoses and all orders for this visit:    Impaired fasting glucose  -     Hemoglobin A1C; Future  -     Hemoglobin A1C    Elevated liver enzymes  -     Comprehensive metabolic panel; Future  -     Iron; Future  -     Ceruloplasmin; Future  -     US right upper quadrant; Future  -     Comprehensive metabolic panel  -     Iron  -     Ceruloplasmin    Primary osteoarthritis of both knees       Patient has upcoming preop clearance for knee surgery   Labs ordered   He will get an EKG done at the preop clearance visit   Discussed all his labs in detail and answer questions   His blood pressure controlled today   He will follow-up in 2 weeks      Subjective:     Chief Complaint   Patient presents with    lab work     review        Patient ID: Bernardo Blanchard is a 61 y o  male  Patient presents today to review his lab work   His LFTs were elevated as well as sugars  Rest of lab work was normal   He has no acute complaints today he is having knee surgery in the near future and will need to preop clearance      The following portions of the patient's history were reviewed and updated as appropriate: allergies, current medications, past family history, past medical history, past social history, past surgical history and problem list     Review of Systems   Constitutional: Negative  HENT: Negative  Eyes: Negative  Respiratory: Negative  Cardiovascular: Negative  Gastrointestinal: Negative  Endocrine: Negative  Genitourinary: Negative  Musculoskeletal: Negative  Skin: Negative  Allergic/Immunologic: Negative  Neurological: Negative  Hematological: Negative  Psychiatric/Behavioral: Negative  All other systems reviewed and are negative          Objective:    Vitals:    01/07/22 1515   BP: 136/80   BP Location: Left arm   Patient Position: Sitting   Cuff Size: Large   Pulse: 69   Resp: 22   Temp: 98 2 °F (36 8 °C)   TempSrc: Tympanic   SpO2: 96%   Weight: 131 kg (288 lb 12 8 oz)   Height: 5' 10 5" (1 791 m)          Physical Exam  Vitals and nursing note reviewed  Constitutional:       General: He is not in acute distress  Appearance: He is well-developed  HENT:      Head: Normocephalic  Right Ear: External ear normal       Left Ear: External ear normal       Nose: Nose normal    Eyes:      General:         Right eye: No discharge  Left eye: No discharge  Conjunctiva/sclera: Conjunctivae normal       Pupils: Pupils are equal, round, and reactive to light  Cardiovascular:      Rate and Rhythm: Normal rate and regular rhythm  Heart sounds: Normal heart sounds  Pulmonary:      Effort: Pulmonary effort is normal       Breath sounds: Normal breath sounds  Abdominal:      General: Bowel sounds are normal  There is no distension  Palpations: Abdomen is soft  Tenderness: There is no abdominal tenderness  Musculoskeletal:         General: Normal range of motion  Cervical back: Normal range of motion  Skin:     General: Skin is warm and dry  Findings: No rash  Neurological:      Mental Status: He is alert and oriented to person, place, and time  Cranial Nerves: No cranial nerve deficit  Psychiatric:         Behavior: Behavior normal          Thought Content:  Thought content normal          Judgment: Judgment normal

## 2022-01-07 NOTE — PROGRESS NOTES
Assessment/Plan:  1  Primary osteoarthritis of left knee    2  Other tear of medial meniscus, current injury, left knee, initial encounter    3  Preoperative testing      Orders Placed This Encounter   Procedures    CBC and differential    Basic metabolic panel    Ambulatory referral to [address] EKG 12 lead       The patient has left knee medial meniscus tear and lateral meniscus degeneration  We discussed treatment options as well as risks and benefits of treatment options  The patient has intolerable pain, feels limited and has tried and failed conservative treatment options at this point  They have elected to proceed with a left knee arthroscopy  The risks of surgery include, but are not limited to infection, blood clot, wound healing problems, blood loss, damage to blood vessels and nerves, persistent pain and stiffness, need for additional surgery, heart attack, stroke, death  The patient understood and agreed to by oral and written consent  I answered all questions regarding surgery  The patient has the direct phone number to the office for any further questions  DVT prophylaxis: ASA x 2 weeks post op  Clearance will be obtained from: PCP     Return for post op left knee scope  I answered all of the patient's questions during the visit and provided education of the patient's condition during the visit  The patient verbalized understanding of the information given and agrees with the plan  This note was dictated using Arithmatica software  It may contain errors including improperly dictated words  Please contact physician directly for any questions  Subjective   Chief Complaint:   Chief Complaint   Patient presents with    Left Knee - Follow-up       HPI  Carlene Kevin is a 61 y o  male who presents for follow up for left knee pain and OA  Patient is here today for left knee MRI review  Patient reports that his left knee pain has worsened since his last visit   He got labs done which showed increase in liver enzymes which he thought was a result of taking too much aleve  He discontinue the aleve and has had increased knee pain since  Patient reports pain over the anterior and lateral knee  Pain is worse with bending and twisting movements  The knee does get stiff if he sits too long or when getting out of the chair  Reports episodes of instability He was taking aleve with relief  He has completed PT in the past  He has had injections with variable relief ranging from 6 months to 1 week  Review of Systems  ROS:    See HPI for musculoskeletal review  All other systems reviewed are negative     History:  Past Medical History:   Diagnosis Date    Colon polyp     CPAP (continuous positive airway pressure) dependence     GERD (gastroesophageal reflux disease)     Hypertension     Internal derangement of knee involving medial meniscus     Resolved 6/5/2015     Lyme disease     Resolved 11/11/2016     Sleep apnea      Past Surgical History:   Procedure Laterality Date    COLONOSCOPY  11/06/2020    All locations appeared normal, no evidence of polyps or masses, small internal hemorrhoids, 5 year recall November 2025    KNEE ARTHROSCOPY Right 1991    UPPER GASTROINTESTINAL ENDOSCOPY  11/06/2020    Normal esophagus, normal stomach (large J-shaped stomach but grossly normal), normal duodenum  Biopsies negative for celiac disease, metaplasia, dysplasia, H pylori, positive for reactive gastropathy       Social History   Social History     Substance and Sexual Activity   Alcohol Use Not Currently     Social History     Substance and Sexual Activity   Drug Use No     Social History     Tobacco Use   Smoking Status Never Smoker   Smokeless Tobacco Never Used     Family History:   Family History   Problem Relation Age of Onset   Jozef Pert Cancer Mother     Colon cancer Mother     Diabetes Father     Heart disease Father     Hypertension Father     Cancer Sister        Current Outpatient Medications on File Prior to Visit   Medication Sig Dispense Refill    amLODIPine (NORVASC) 10 mg tablet Take 1 tablet (10 mg total) by mouth daily 90 tablet 1    lisinopril (ZESTRIL) 40 mg tablet Take 1 tablet (40 mg total) by mouth daily 90 tablet 1    loratadine (CLARITIN) 10 mg tablet Take 1 tablet by mouth as needed        naproxen sodium (ALEVE) 220 MG tablet Take 1 tablet by mouth daily      omeprazole (PriLOSEC) 20 mg delayed release capsule Take 20 mg by mouth daily       No current facility-administered medications on file prior to visit       No Known Allergies     Objective     /77   Pulse 76   Temp 97 7 °F (36 5 °C)   Ht 5' 10 5" (1 791 m)   Wt 131 kg (288 lb 3 2 oz)   BMI 40 77 kg/m²      PE:  AAOx 3  WDWN  Hearing intact, no drainage from eyes  no audible wheezing  no abdominal distension  LE compartments soft, skin intact    Ortho Exam:  left Knee:   No erythema  no swelling  no effusion  no warmth  No TTP  AROM: 0- 120  Stable to varus/valgus stress    Imaging Studies: I have personally reviewed pertinent films in PACS  MRI left knee: medial meniscus tear, lateral meniscus degeneration, osteoarthritis

## 2022-01-12 ENCOUNTER — TELEPHONE (OUTPATIENT)
Dept: OBGYN CLINIC | Facility: MEDICAL CENTER | Age: 67
End: 2022-01-12

## 2022-01-12 NOTE — TELEPHONE ENCOUNTER
Left vm for patient that one blood test is needed prior to his appointment for medical clearance with Dr Brian Kiser on 1/28/22

## 2022-01-15 LAB
ALBUMIN SERPL-MCNC: 4.2 G/DL (ref 3.8–4.8)
ALBUMIN/GLOB SERPL: 1.4 {RATIO} (ref 1.2–2.2)
ALP SERPL-CCNC: 66 IU/L (ref 44–121)
ALT SERPL-CCNC: 96 IU/L (ref 0–44)
AST SERPL-CCNC: 60 IU/L (ref 0–40)
BILIRUB SERPL-MCNC: 0.5 MG/DL (ref 0–1.2)
BUN SERPL-MCNC: 15 MG/DL (ref 8–27)
BUN/CREAT SERPL: 18 (ref 10–24)
CALCIUM SERPL-MCNC: 9.1 MG/DL (ref 8.6–10.2)
CERULOPLASMIN SERPL-MCNC: 17.7 MG/DL (ref 16–31)
CHLORIDE SERPL-SCNC: 104 MMOL/L (ref 96–106)
CO2 SERPL-SCNC: 26 MMOL/L (ref 20–29)
CREAT SERPL-MCNC: 0.83 MG/DL (ref 0.76–1.27)
EST. AVERAGE GLUCOSE BLD GHB EST-MCNC: 166 MG/DL
GLOBULIN SER-MCNC: 2.9 G/DL (ref 1.5–4.5)
GLUCOSE SERPL-MCNC: 141 MG/DL (ref 65–99)
HBA1C MFR BLD: 7.4 % (ref 4.8–5.6)
IRON SERPL-MCNC: 133 UG/DL (ref 38–169)
POTASSIUM SERPL-SCNC: 4.6 MMOL/L (ref 3.5–5.2)
PROT SERPL-MCNC: 7.1 G/DL (ref 6–8.5)
SL AMB EGFR AFRICAN AMERICAN: 107 ML/MIN/1.73
SL AMB EGFR NON AFRICAN AMERICAN: 92 ML/MIN/1.73
SODIUM SERPL-SCNC: 142 MMOL/L (ref 134–144)

## 2022-01-15 PROCEDURE — 3051F HG A1C>EQUAL 7.0%<8.0%: CPT | Performed by: FAMILY MEDICINE

## 2022-01-21 ENCOUNTER — OFFICE VISIT (OUTPATIENT)
Dept: FAMILY MEDICINE CLINIC | Facility: CLINIC | Age: 67
End: 2022-01-21
Payer: COMMERCIAL

## 2022-01-21 VITALS
HEART RATE: 72 BPM | RESPIRATION RATE: 19 BRPM | TEMPERATURE: 96.8 F | DIASTOLIC BLOOD PRESSURE: 82 MMHG | OXYGEN SATURATION: 96 % | SYSTOLIC BLOOD PRESSURE: 136 MMHG | BODY MASS INDEX: 39.56 KG/M2 | WEIGHT: 282.6 LBS | HEIGHT: 71 IN

## 2022-01-21 DIAGNOSIS — E66.9 OBESITY WITH SERIOUS COMORBIDITY, UNSPECIFIED CLASSIFICATION, UNSPECIFIED OBESITY TYPE: ICD-10-CM

## 2022-01-21 DIAGNOSIS — M23.305 INTERNAL DERANGEMENT OF KNEE INVOLVING MEDIAL MENISCUS: ICD-10-CM

## 2022-01-21 DIAGNOSIS — E11.8 TYPE 2 DIABETES MELLITUS WITH COMPLICATION, WITHOUT LONG-TERM CURRENT USE OF INSULIN (HCC): Primary | ICD-10-CM

## 2022-01-21 DIAGNOSIS — Z01.818 PREOPERATIVE CLEARANCE: ICD-10-CM

## 2022-01-21 DIAGNOSIS — E78.5 HYPERLIPIDEMIA, UNSPECIFIED HYPERLIPIDEMIA TYPE: ICD-10-CM

## 2022-01-21 PROCEDURE — 3725F SCREEN DEPRESSION PERFORMED: CPT | Performed by: FAMILY MEDICINE

## 2022-01-21 PROCEDURE — 3008F BODY MASS INDEX DOCD: CPT | Performed by: FAMILY MEDICINE

## 2022-01-21 PROCEDURE — 3075F SYST BP GE 130 - 139MM HG: CPT | Performed by: FAMILY MEDICINE

## 2022-01-21 PROCEDURE — 99214 OFFICE O/P EST MOD 30 MIN: CPT | Performed by: FAMILY MEDICINE

## 2022-01-21 PROCEDURE — 3079F DIAST BP 80-89 MM HG: CPT | Performed by: FAMILY MEDICINE

## 2022-01-21 PROCEDURE — 1036F TOBACCO NON-USER: CPT | Performed by: FAMILY MEDICINE

## 2022-01-21 RX ORDER — NAPROXEN SODIUM 220 MG
TABLET ORAL
COMMUNITY
Start: 2022-01-10

## 2022-01-21 NOTE — PROGRESS NOTES
Assessment/Plan:       Diagnoses and all orders for this visit:    Type 2 diabetes mellitus with complication, without long-term current use of insulin (HCC)  -     metFORMIN (GLUCOPHAGE) 500 mg tablet; Take 1 tablet (500 mg total) by mouth 2 (two) times a day with meals    Preoperative clearance    Internal derangement of knee involving medial meniscus    Hyperlipidemia, unspecified hyperlipidemia type    Obesity with serious comorbidity, unspecified classification, unspecified obesity type    Other orders  -     naproxen sodium (Aleve) 220 MG tablet  -     Multiple Vitamin (ONE-A-DAY MENS PO); Take by mouth in the morning       Reviewed patient's labs   His sugars are up at 7 4 is now diabetic  He has already started to changes diet lose some weight will start metformin 500 mg twice a day  Patient had appointment next week for preop clearance we medically cleared him today pending his EKG which he has not done yet  He will continue his other medications  He will follow-up with me in 3 months or sooner if needed    Subjective:     Chief Complaint   Patient presents with    Results     discuss lab results  Pt has been working on diet over the last week and has lost 6 lbs  Patient ID: Sherlie Kocher is a 74 y o  male  Pt is presenting to the office today to go over his blood work  His CMP in December showed an elevated glucose level, and follow up HgbA1C was 7 4%  Pt denies any polyuria, polydipsia, polyphagia  Denies any numbness or tingling of the LEs  Pt states since he received those lab results, he has significantly changed his diet and has lost 6 lbs this week  The following portions of the patient's history were reviewed and updated as appropriate: allergies, current medications, past family history, past medical history, past social history, past surgical history and problem list     Review of Systems   Constitutional: Negative  HENT: Negative  Eyes: Negative      Respiratory: Negative  Cardiovascular: Negative  Gastrointestinal: Negative  Endocrine: Negative  Genitourinary: Negative  Musculoskeletal: Negative  Skin: Negative  Allergic/Immunologic: Negative  Neurological: Negative  Hematological: Negative  Psychiatric/Behavioral: Negative  All other systems reviewed and are negative  Objective:    Vitals:    01/21/22 1105   BP: 136/82   BP Location: Left arm   Patient Position: Sitting   Cuff Size: Large   Pulse: 72   Resp: 19   Temp: (!) 96 8 °F (36 °C)   TempSrc: Tympanic   SpO2: 96%   Weight: 128 kg (282 lb 9 6 oz)   Height: 5' 10 5" (1 791 m)          Physical Exam  Vitals and nursing note reviewed  Constitutional:       General: He is not in acute distress  Appearance: He is well-developed  HENT:      Head: Normocephalic  Right Ear: External ear normal       Left Ear: External ear normal       Nose: Nose normal    Eyes:      General:         Right eye: No discharge  Left eye: No discharge  Conjunctiva/sclera: Conjunctivae normal       Pupils: Pupils are equal, round, and reactive to light  Cardiovascular:      Rate and Rhythm: Normal rate and regular rhythm  Heart sounds: Normal heart sounds  Pulmonary:      Effort: Pulmonary effort is normal       Breath sounds: Normal breath sounds  Abdominal:      General: Bowel sounds are normal  There is no distension  Palpations: Abdomen is soft  Tenderness: There is no abdominal tenderness  Musculoskeletal:         General: Normal range of motion  Cervical back: Normal range of motion  Skin:     General: Skin is warm and dry  Findings: No rash  Neurological:      Mental Status: He is alert and oriented to person, place, and time  Cranial Nerves: No cranial nerve deficit  Psychiatric:         Behavior: Behavior normal          Thought Content:  Thought content normal          Judgment: Judgment normal

## 2022-01-25 ENCOUNTER — HOSPITAL ENCOUNTER (OUTPATIENT)
Dept: NON INVASIVE DIAGNOSTICS | Facility: CLINIC | Age: 67
Discharge: HOME | End: 2022-01-25
Payer: COMMERCIAL

## 2022-01-25 DIAGNOSIS — M17.12 PRIMARY OSTEOARTHRITIS OF LEFT KNEE: ICD-10-CM

## 2022-01-25 DIAGNOSIS — Z01.818 PREOPERATIVE TESTING: ICD-10-CM

## 2022-01-25 DIAGNOSIS — S83.242A OTHER TEAR OF MEDIAL MENISCUS, CURRENT INJURY, LEFT KNEE, INITIAL ENCOUNTER: ICD-10-CM

## 2022-01-25 LAB
ATRIAL RATE: 65 BPM
P AXIS: 50 DEGREES
PR INTERVAL: 160 MS
QRS AXIS: -5 DEGREES
QRSD INTERVAL: 92 MS
QT INTERVAL: 392 MS
QTC INTERVAL: 407 MS
T WAVE AXIS: 38 DEGREES
VENTRICULAR RATE: 65 BPM

## 2022-01-25 PROCEDURE — 93010 ELECTROCARDIOGRAM REPORT: CPT | Performed by: INTERNAL MEDICINE

## 2022-01-25 PROCEDURE — 93005 ELECTROCARDIOGRAM TRACING: CPT

## 2022-02-08 DIAGNOSIS — Z11.59 SPECIAL SCREENING EXAMINATION FOR VIRAL DISEASE: Primary | ICD-10-CM

## 2022-02-16 NOTE — PRE-PROCEDURE INSTRUCTIONS
Pre-Surgery Instructions:   Medication Instructions    amLODIPine (NORVASC) 10 mg tablet May take DOS    lisinopril (ZESTRIL) 40 mg tablet Hold DOS    loratadine (CLARITIN) 10 mg tablet Hold DOS    metFORMIN (GLUCOPHAGE) 500 mg tablet Hold DOS    Multiple Vitamin (ONE-A-DAY MENS PO) Hold 7 days prior to surgery    naproxen sodium (Aleve) 220 MG tablet Hold 7 days prior to surgery    omeprazole (PriLOSEC) 20 mg delayed release capsule May take DOS        NPO after midnight, meds in am with sips of water  Understands when to expect preop phone call  Remove all jewelry  Advised of visitation policy  Before your operation, you play an important role in decreasing your risk for infection by washing with special antiseptic soap  This is an effective way to reduce bacteria on the skin which may help to prevent infections at the surgical site  Please read the following directions in advance  1  In the week before your operation purchase a 4 ounce bottle of antiseptic soap containing chlorhexidine gluconate 4%  Some brand names include: Aplicare, Endure, and Hibiclens  The cost is usually less than $5 00  · For your convenience, the [address] [place] [address] carries the soap  · It may also be available at your doctor's office or pre-admission testing center, and at most retail pharmacies  · If you are allergic or sensitive to soaps containing chlorhexidine gluconate (CHG), please let your doctor know so another antiseptic soap can be suggested  · CHG antiseptic soap is for external use only  2      The day before your operation follow these directions carefully to get ready  · Place clean lines (sheets) on your bed; you should sleep on clean sheets after your evening shower  · Get clean towels and washcloths ready - you need enough for 2 showers  · Set aside clean underwear, pajamas, and clothing to wear after the shower      Reminders:  · DO NOT use any other soap or body rinse on your skin during or after the antiseptic showers  · DO NOT use lotion , powder, deodorant, or perfume/aftershave of any kind on your skin after your antiseptic shower  · DO NOT shave any body parts in the 24 hours/the day before your operation  · DO NOT get the antiseptic soap in your eyes, ears, nose, mouth, or vaginal area  3      You will need to shower the night before AND the morning of your Surgery  Shower 1:  · The evening before your operation, take the fist shower  · First, shampoo your hair with regular shampoo and rinse it completely before you use the anitseptic soap  After washing and rinsing your hair, rinse your body  · Next, use a clean wash cloth to apply the antiseptic soap and wash your body from the neck down to your toes using 1/2 bottle of the antiseptic soap  You will use the other 1/2 bottle for the second shower  · Clean the area where your incision will be; later this area well for about 2 minutes  · If you ar having head or neck surgery, wash areas with the antiseptic soap  · Rinse yourself completely with running water  · Use a clean towel to dry off  · Wear clean underwear and clothing/pajamas  Shower 2:  · The Morning of your operation, take the second shower following the same steps as Shower 1 using the second 1/2 of the bottle of antiseptic soap  · Use clean cloths and towels to was and dry yourself off  · Wear clean underwear and clothing

## 2022-02-17 ENCOUNTER — CLINICAL SUPPORT (OUTPATIENT)
Dept: FAMILY MEDICINE CLINIC | Facility: CLINIC | Age: 67
End: 2022-02-17

## 2022-02-17 DIAGNOSIS — Z11.52 ENCOUNTER FOR SCREENING FOR COVID-19: Primary | ICD-10-CM

## 2022-02-17 DIAGNOSIS — Z11.59 SPECIAL SCREENING EXAMINATION FOR VIRAL DISEASE: ICD-10-CM

## 2022-02-17 PROCEDURE — U0003 INFECTIOUS AGENT DETECTION BY NUCLEIC ACID (DNA OR RNA); SEVERE ACUTE RESPIRATORY SYNDROME CORONAVIRUS 2 (SARS-COV-2) (CORONAVIRUS DISEASE [COVID-19]), AMPLIFIED PROBE TECHNIQUE, MAKING USE OF HIGH THROUGHPUT TECHNOLOGIES AS DESCRIBED BY CMS-2020-01-R: HCPCS | Performed by: ORTHOPAEDIC SURGERY

## 2022-02-17 PROCEDURE — U0005 INFEC AGEN DETEC AMPLI PROBE: HCPCS | Performed by: ORTHOPAEDIC SURGERY

## 2022-02-18 LAB — SARS-COV-2 RNA RESP QL NAA+PROBE: NEGATIVE

## 2022-02-21 ENCOUNTER — ANESTHESIA EVENT (OUTPATIENT)
Dept: PERIOP | Facility: HOSPITAL | Age: 67
End: 2022-02-21
Payer: COMMERCIAL

## 2022-02-22 ENCOUNTER — ANESTHESIA (OUTPATIENT)
Dept: PERIOP | Facility: HOSPITAL | Age: 67
End: 2022-02-22
Payer: COMMERCIAL

## 2022-02-22 ENCOUNTER — HOSPITAL ENCOUNTER (OUTPATIENT)
Facility: HOSPITAL | Age: 67
Setting detail: OUTPATIENT SURGERY
Discharge: HOME | End: 2022-02-22
Attending: ORTHOPAEDIC SURGERY | Admitting: ORTHOPAEDIC SURGERY
Payer: COMMERCIAL

## 2022-02-22 VITALS
DIASTOLIC BLOOD PRESSURE: 76 MMHG | TEMPERATURE: 97.4 F | OXYGEN SATURATION: 95 % | WEIGHT: 272.93 LBS | HEIGHT: 71 IN | SYSTOLIC BLOOD PRESSURE: 131 MMHG | BODY MASS INDEX: 38.21 KG/M2 | RESPIRATION RATE: 16 BRPM | HEART RATE: 76 BPM

## 2022-02-22 DIAGNOSIS — Z98.890 STATUS POST ARTHROSCOPY OF LEFT KNEE: Primary | ICD-10-CM

## 2022-02-22 LAB
GLUCOSE SERPL-MCNC: 125 MG/DL (ref 65–140)
GLUCOSE SERPL-MCNC: 131 MG/DL (ref 65–140)

## 2022-02-22 PROCEDURE — 82948 REAGENT STRIP/BLOOD GLUCOSE: CPT

## 2022-02-22 PROCEDURE — 29881 ARTHRS KNE SRG MNISECTMY M/L: CPT | Performed by: ORTHOPAEDIC SURGERY

## 2022-02-22 PROCEDURE — NC001 PR NO CHARGE: Performed by: ORTHOPAEDIC SURGERY

## 2022-02-22 RX ORDER — CHLORHEXIDINE GLUCONATE 0.12 MG/ML
15 RINSE ORAL ONCE
Status: COMPLETED | OUTPATIENT
Start: 2022-02-22 | End: 2022-02-22

## 2022-02-22 RX ORDER — PROPOFOL 10 MG/ML
INJECTION, EMULSION INTRAVENOUS AS NEEDED
Status: DISCONTINUED | OUTPATIENT
Start: 2022-02-22 | End: 2022-02-22

## 2022-02-22 RX ORDER — SODIUM CHLORIDE, SODIUM LACTATE, POTASSIUM CHLORIDE, CALCIUM CHLORIDE 600; 310; 30; 20 MG/100ML; MG/100ML; MG/100ML; MG/100ML
100 INJECTION, SOLUTION INTRAVENOUS CONTINUOUS
Status: DISCONTINUED | OUTPATIENT
Start: 2022-02-22 | End: 2022-02-22

## 2022-02-22 RX ORDER — FENTANYL CITRATE/PF 50 MCG/ML
25 SYRINGE (ML) INJECTION
Status: DISCONTINUED | OUTPATIENT
Start: 2022-02-22 | End: 2022-02-22 | Stop reason: HOSPADM

## 2022-02-22 RX ORDER — OXYCODONE HYDROCHLORIDE 5 MG/1
5 TABLET ORAL EVERY 4 HOURS PRN
Status: DISCONTINUED | OUTPATIENT
Start: 2022-02-22 | End: 2022-02-22 | Stop reason: HOSPADM

## 2022-02-22 RX ORDER — LIDOCAINE HYDROCHLORIDE 20 MG/ML
INJECTION, SOLUTION EPIDURAL; INFILTRATION; INTRACAUDAL; PERINEURAL AS NEEDED
Status: DISCONTINUED | OUTPATIENT
Start: 2022-02-22 | End: 2022-02-22

## 2022-02-22 RX ORDER — MIDAZOLAM HYDROCHLORIDE 2 MG/2ML
INJECTION, SOLUTION INTRAMUSCULAR; INTRAVENOUS AS NEEDED
Status: DISCONTINUED | OUTPATIENT
Start: 2022-02-22 | End: 2022-02-22

## 2022-02-22 RX ORDER — LIDOCAINE HYDROCHLORIDE AND EPINEPHRINE 10; 10 MG/ML; UG/ML
INJECTION, SOLUTION INFILTRATION; PERINEURAL AS NEEDED
Status: DISCONTINUED | OUTPATIENT
Start: 2022-02-22 | End: 2022-02-22 | Stop reason: HOSPADM

## 2022-02-22 RX ORDER — CEFAZOLIN SODIUM 2 G/50ML
2000 SOLUTION INTRAVENOUS ONCE
Status: DISCONTINUED | OUTPATIENT
Start: 2022-02-22 | End: 2022-02-22

## 2022-02-22 RX ORDER — ASPIRIN 325 MG
325 TABLET, DELAYED RELEASE (ENTERIC COATED) ORAL 2 TIMES DAILY
Qty: 28 TABLET | Refills: 0 | Status: SHIPPED | OUTPATIENT
Start: 2022-02-22

## 2022-02-22 RX ORDER — MAGNESIUM HYDROXIDE 1200 MG/15ML
LIQUID ORAL AS NEEDED
Status: DISCONTINUED | OUTPATIENT
Start: 2022-02-22 | End: 2022-02-22 | Stop reason: HOSPADM

## 2022-02-22 RX ORDER — ROPIVACAINE HYDROCHLORIDE 5 MG/ML
INJECTION, SOLUTION EPIDURAL; INFILTRATION; PERINEURAL AS NEEDED
Status: DISCONTINUED | OUTPATIENT
Start: 2022-02-22 | End: 2022-02-22

## 2022-02-22 RX ORDER — ONDANSETRON 2 MG/ML
4 INJECTION INTRAMUSCULAR; INTRAVENOUS ONCE AS NEEDED
Status: DISCONTINUED | OUTPATIENT
Start: 2022-02-22 | End: 2022-02-22 | Stop reason: HOSPADM

## 2022-02-22 RX ORDER — OXYCODONE HYDROCHLORIDE 5 MG/1
5 TABLET ORAL EVERY 6 HOURS PRN
Qty: 15 TABLET | Refills: 0 | Status: SHIPPED | OUTPATIENT
Start: 2022-02-22

## 2022-02-22 RX ORDER — ONDANSETRON 2 MG/ML
INJECTION INTRAMUSCULAR; INTRAVENOUS AS NEEDED
Status: DISCONTINUED | OUTPATIENT
Start: 2022-02-22 | End: 2022-02-22

## 2022-02-22 RX ORDER — PROMETHAZINE HYDROCHLORIDE 12.5 MG/1
12.5 TABLET ORAL EVERY 6 HOURS PRN
Qty: 4 TABLET | Refills: 0 | Status: SHIPPED | OUTPATIENT
Start: 2022-02-22 | End: 2022-04-22 | Stop reason: ALTCHOICE

## 2022-02-22 RX ORDER — ONDANSETRON 2 MG/ML
4 INJECTION INTRAMUSCULAR; INTRAVENOUS EVERY 6 HOURS PRN
Status: DISCONTINUED | OUTPATIENT
Start: 2022-02-22 | End: 2022-02-22 | Stop reason: HOSPADM

## 2022-02-22 RX ORDER — FENTANYL CITRATE 50 UG/ML
INJECTION, SOLUTION INTRAMUSCULAR; INTRAVENOUS AS NEEDED
Status: DISCONTINUED | OUTPATIENT
Start: 2022-02-22 | End: 2022-02-22

## 2022-02-22 RX ADMIN — FENTANYL CITRATE 100 MCG: 50 INJECTION INTRAMUSCULAR; INTRAVENOUS at 09:58

## 2022-02-22 RX ADMIN — ROPIVACAINE HYDROCHLORIDE 30 ML: 5 INJECTION, SOLUTION EPIDURAL; INFILTRATION; PERINEURAL at 10:01

## 2022-02-22 RX ADMIN — MIDAZOLAM 4 MG: 1 INJECTION INTRAMUSCULAR; INTRAVENOUS at 09:58

## 2022-02-22 RX ADMIN — SODIUM CHLORIDE, SODIUM LACTATE, POTASSIUM CHLORIDE, AND CALCIUM CHLORIDE: .6; .31; .03; .02 INJECTION, SOLUTION INTRAVENOUS at 11:08

## 2022-02-22 RX ADMIN — CEFAZOLIN SODIUM 3000 MG: 2 SOLUTION INTRAVENOUS at 10:42

## 2022-02-22 RX ADMIN — LIDOCAINE HYDROCHLORIDE 100 MG: 20 INJECTION, SOLUTION EPIDURAL; INFILTRATION; INTRACAUDAL; PERINEURAL at 10:48

## 2022-02-22 RX ADMIN — SODIUM CHLORIDE, SODIUM LACTATE, POTASSIUM CHLORIDE, AND CALCIUM CHLORIDE 100 ML/HR: .6; .31; .03; .02 INJECTION, SOLUTION INTRAVENOUS at 09:35

## 2022-02-22 RX ADMIN — PROPOFOL 200 MG: 10 INJECTION, EMULSION INTRAVENOUS at 10:48

## 2022-02-22 RX ADMIN — CHLORHEXIDINE GLUCONATE 0.12% ORAL RINSE 15 ML: 1.2 LIQUID ORAL at 09:36

## 2022-02-22 RX ADMIN — ONDANSETRON 4 MG: 2 INJECTION INTRAMUSCULAR; INTRAVENOUS at 11:44

## 2022-02-22 NOTE — DISCHARGE INSTRUCTIONS
[facility]    Postoperative Instructions  Knee Arthroscopy    WOUND CARE: You may remove your dressing in 3 days  Replace the dressing with band aids over the incisions  Do not get your incisions wet for the next 3 days  After 3 days you may shower and let the water run over your incisions  Pat dry and place clean band-aids over your incisions daily  If purulent drainage (thick white, yellow or greenish in color) is coming from the wound, youre having drainage beyond 5 days, you notice any increasing redness around the wound, or you have a temperature greater than 101 degrees please let your doctor or the doctor on call know  WEIGHT BEARING:   You are permitted to bear weight as tolerated on your operative leg  Use crutches for the first 2-3 days until you are comfortable enough to walk  After the first few days you are permitted to move your knee to tolerance  Avoid deep knee bends, kneeling, and stooping  It is important to get up and move around to prevent blood clots  PAIN/SWELLING:  To help reduce swelling of the knee, remember to elevate the operative leg  Elevate for 30 minutes every 2 hours initially  Be sure to also use ice at least 3-4 times a day  Cover your dressing with a thin towel or pillowcase  Put ice in a zip lock bag or towel and place over your surgical site  You can alternate for 20 minutes with the ice on and then 20 minutes with the ice off  Use narcotic pain medication as prescribed  Try to wean down as tolerated  These medications can cause constipation and you may want to use an over the counter stool softener  Nausea and vomiting can also be a side effect of narcotic medications  You have been provided a prescription medication for nausea and/or vomiting to be used as needed  If the narcotic you have been given does not contain Tylenol, you may use it alongside your prescription pain medication    Do not take any medications that you are allergic to  If a refill of medication is needed, please call the office during regular business hours Monday through Friday  In general, refills will not be made after hours or on weekends so please plan ahead  BLOOD CLOT  For the first 2 weeks after surgery, please take Aspirin 325mg   PREVENTION: twice daily by mouth to prevent blood clots while you are less active  DRIVING:   You are not permitted to drive until you follow up in the office  You can no longer be on narcotic pain medication and must feel strong and alert to drive  FOLLOW UP:  You should follow up in the office 2 weeks from the day of surgery  [facility]   [address] , [address], [address] [facility]   [phone]    We wish you a rapid and comfortable recovery!

## 2022-02-22 NOTE — H&P
H&P Exam - Orthopedics   Corinna Desouza 74 y o  male MRN: [id number]  Unit/Bed#: [facility] Encounter: [id number]    Assessment/Plan     Assessment:  L knee medial meniscal tear, arthritis    Plan:  To go to OR today for SALK, partial medial menisectomy, debridement, and any associated procedures  Consented in office  All questions answered  NPO    History of Present Illness   HPI:  Corinna Desouza is a 74 y o  male who presents with L knee medial meniscal tear  He has tried and failed conservative tx  He is ready to go to the OR today  Review of Systems    Historical Information   Past Medical History:   Diagnosis Date    Arthritis     b/l knees    today 2/22/2022  L knee scope    Colon polyp     CPAP (continuous positive airway pressure) dependence     Diabetes mellitus ([address] )     GERD (gastroesophageal reflux disease)     Hyperlipidemia     Hypertension     Internal derangement of knee involving medial meniscus     Resolved 6/5/2015     Lyme disease     Resolved 11/11/2016     Sleep apnea      Past Surgical History:   Procedure Laterality Date    COLONOSCOPY  11/06/2020    All locations appeared normal, no evidence of polyps or masses, small internal hemorrhoids, 5 year recall November 2025    KNEE ARTHROSCOPY Right 1991    UPPER GASTROINTESTINAL ENDOSCOPY  11/06/2020    Normal esophagus, normal stomach (large J-shaped stomach but grossly normal), normal duodenum  Biopsies negative for celiac disease, metaplasia, dysplasia, H pylori, positive for reactive gastropathy       Social History   Social History     Substance and Sexual Activity   Alcohol Use Yes    Alcohol/week: 2 0 standard drinks    Types: 2 Cans of beer per week    Comment: 1 x monthly     Social History     Substance and Sexual Activity   Drug Use No     Social History     Tobacco Use   Smoking Status Never Smoker   Smokeless Tobacco Never Used     Family History   Problem Relation Age of Onset    Cancer Mother     Colon cancer Mother     Diabetes Father     Heart disease Father     Hypertension Father     Cancer Sister        Meds/Allergies     Current Facility-Administered Medications:     ceFAZolin (ANCEF) IVPB (premix in dextrose) 2,000 mg 50 mL, 2,000 mg, Intravenous, Once, Subha Cook DO    lactated ringers infusion, 100 mL/hr, Intravenous, Continuous, Wil Serrano DO, Last Rate: 100 mL/hr at 02/22/22 1004, Continue from Pre-op at 02/22/22 1004    Facility-Administered Medications Ordered in Other Encounters:     fentanyl citrate (PF) 100 MCG/2ML, , Intravenous, PRN, Delphy Defalcis, DO, 100 mcg at 02/22/22 0958    midazolam (VERSED) injection, , Intravenous, PRN, Delphy Defalcis, DO, 4 mg at 02/22/22 0958    ropivacaine (NAROPIN) 0 5 % injection, , Infiltration, PRN, Delphy Defalcis, DO, 30 mL at 02/22/22 1001  No Known Allergies    Objective   Vitals: Blood pressure 123/80, pulse 68, temperature 98 6 °F (37 °C), temperature source Temporal, resp  rate 16, height 5' 10 5" (1 791 m), weight 124 kg (272 lb 14 9 oz), SpO2 96 %  ,Body mass index is 38 61 kg/m²  No intake or output data in the 24 hours ending 02/22/22 1014    No intake/output data recorded  Invasive Devices  Report    Peripheral Intravenous Line            Peripheral IV 02/22/22 Dorsal (posterior); Right Hand <1 day                GENERAL:  Awake and alert  HEENT: Hearing intact  HEART:  Regular rate  LUNGS: No audible wheezing  ABD:  No abdominal distension    Ortho Exam  L knee:  Mild generalized swelling, skin intact, no erythema, no effusion  LLE:  AT/GS intact  Imaging: previously reviewed in the office with patient    Code Status: No Order  Advance Directive and Living Will:      Power of :    POLST:

## 2022-02-22 NOTE — ANESTHESIA PROCEDURE NOTES
Peripheral Block    Patient location during procedure: holding area  Start time: 2/22/2022 9:58 AM  Reason for block: at surgeon's request and post-op pain management  Staffing  Performed: Anesthesiologist   Anesthesiologist: Bernabe Cade DO  Preanesthetic Checklist  Completed: patient identified, IV checked, site marked, risks and benefits discussed, surgical consent, monitors and equipment checked, pre-op evaluation and timeout performed  Peripheral Block  Patient position: supine  Prep: ChloraPrep  Patient monitoring: continuous pulse ox and frequent blood pressure checks  Block type:  Intra-articular  Laterality: left  Injection technique: single-shot  Needle  Needle type: long-bevel   Needle gauge: 22 G  Needle length: 5 cm  Needle localization: anatomical landmarks  Test dose: negative  Assessment  Injection assessment: incremental injection and negative aspiration for heme  Paresthesia pain: none  Heart rate change: no  Slow fractionated injection: yes  Post-procedure:  site cleaned  patient tolerated the procedure well with no immediate complications

## 2022-02-22 NOTE — ANESTHESIA PREPROCEDURE EVALUATION
Procedure:  ARTHROSCOPY KNEE PARTIAL MEDIAL MENISCECTOMY (Left Knee)    Relevant Problems   CARDIO   (+) HTN (hypertension)      GI/HEPATIC   (+) Fatty liver   (+) GERD without esophagitis      MUSCULOSKELETAL   (+) Osteoarthritis of knee      NEURO/PSYCH   (+) Personal history of colonic polyps      PULMONARY   (+) Sleep apnea      Other   (+) Internal derangement of knee involving medial meniscus        Physical Exam    Airway    Mallampati score: III  TM Distance: >3 FB  Neck ROM: full     Dental   No notable dental hx     Cardiovascular  Rhythm: regular, Rate: normal, Cardiovascular exam normal    Pulmonary  Pulmonary exam normal Breath sounds clear to auscultation,     Other Findings        Anesthesia Plan  ASA Score- 3     Anesthesia Type- general with ASA Monitors  Additional Monitors:   Airway Plan: LMA  Comment: IAB preop  Plan Factors-Exercise tolerance (METS): <4 METS  Chart reviewed  Patient summary reviewed  Patient is not a current smoker  Patient instructed to abstain from smoking on day of procedure  Patient did not smoke on day of surgery  There is medical exclusion for perioperative obstructive sleep apnea risk education  Induction- intravenous  Postoperative Plan- Plan for postoperative opioid use  Informed Consent- Anesthetic plan and risks discussed with patient

## 2022-02-22 NOTE — ANESTHESIA POSTPROCEDURE EVALUATION
Post-Op Assessment Note    CV Status:  Stable  Pain Score: 2    Pain management: adequate     Mental Status:  Alert and awake   Hydration Status:  Euvolemic and stable   PONV Controlled:  Controlled   Airway Patency:  Patent      Post Op Vitals Reviewed: Yes      Staff: Anesthesiologist         No complications documented      /71 (02/22/22 1250)    Temp (!) 97 4 °F (36 3 °C) (02/22/22 1250)    Pulse 69 (02/22/22 1250)   Resp 14 (02/22/22 1250)    SpO2 96 % (02/22/22 1250)

## 2022-02-22 NOTE — OP NOTE
OPERATIVE REPORT  PATIENT NAME: Sweetie Ji    :  1955  MRN: [id number]  Pt Location: [facility] ROOM 01    SURGERY DATE: 2022    Surgeon(s) and Role:     * Yash Carter, DO - Primary  Nik Jack, ATC    Preop Diagnosis:  Other tear of medial meniscus, current injury, left knee, initial encounter [S83 242A]  Primary osteoarthritis of left knee [M17 12]    Post-Op Diagnosis Codes:     * Other tear of medial meniscus, current injury, left knee, initial encounter [L44 437X]     * Primary osteoarthritis of left knee [M17 12]    Procedure(s) (LRB):  ARTHROSCOPY KNEE PARTIAL MEDIAL MENISCECTOMY (Left)    Specimen(s):  * No specimens in log *    Estimated Blood Loss:   Minimal    Drains:  * No LDAs found *    Anesthesia Type:   GA    Operative Indications: Other tear of medial meniscus, current injury, left knee, initial encounter [S83 242A]  Primary osteoarthritis of left knee [M17 12]      Operative Findings:  See below    Complications:   None    Procedure and Technique:  INDICATIONS FOR PROCEDURE:  The patients is a 74 y o  male who presented to the office with left knee pain  MRI revealed a medial meniscal tear  After trial and ultimate failure of conservative management surgery was recommended  Extensive counseling in regards to the reasons for surgical intervention as well as the risks and benefits of surgery were reviewed  The risks include, but are not limited to infection, wound healing problems, persistent pain and stiffness, blood clots, extensive blood loss, need for additional surgery, damage to blood vessels and nerves  The patient understood and agreed to by oral and written consent      OPERATIVE PROCEDURE:  The patient was identified as Sweetie Ji by His ID bracelet by the surgical staff in the preoperative area at [address]   An intraarticular block was performed and the patient was wheeled back to the surgical room and placed on the operative table   Anesthesia was administered and the patient was intubated without complications  Preoperative antibiotics were given  The patient was remained in the supine position and all bony prominences were carefully protected  A tourniquet was applied to the patients left lower extremity  Theleft leg was then prepped and draped in the usual sterile fashion  A timeout was performed where the patients name and surgical site were once again identified  The incisions were marked out  5mL of 1% Lidocaine with epinephine was injected in the anterolateral portal and 5mL of % Lidocaine with epinephrine was injected in the anteromedial portal       A stab incision was made over the anterolateral aspect of the knee and the trocar was placed in the patellofemoral joint  The camera was inserted  Examination of the patellofemoral joint took place  Grade 1 cartilage changes were noted on the patella  Next, the medial compartment was entered and the medial portal was established under direct visualization  Examination of medial compartment was performed  The patient was found to have a medial meniscal tear and cartilage changes  Grade 3 cartilage changes were noted on the medial femoral condyle and grade 2 were noted on the tibial plateau  Using biters and a shaver the damaged portion of the medial meniscus was resected leaving behind a stable rim  Then the intraarticular notch was entered and the ACL was found to be intact and stable  Next, the lateral compartment was entered and examined  The lateral meniscus was found to be intact  Grade 1 cartilage changes were noted on the lateral femoral condyle and grade 2 cartilage changes were noted on the lateral tibial plateau  Finally we returned to the patellofemoral joint  Grade 3 cartilage changes were noted on the trochlea  Photos were taken throughout the case  The knee was copiously irrigated    The portals were closed with subcutanous Monocryl suture and steri strips were placed  A sterile dressing was then placed  The patient was then awakened and transferred to the stretcher and then to the recovery room in stable condition  I attest that I was present and performed this entire procedure  Sravanthi Sims ATC provided essential assistance including draping, positioning, and limb holding    A qualified resident physician was not available    Patient Disposition:  hemodynamically stable      SIGNATURE: Lorna Marrero DO  DATE: February 22, 2022  TIME: 12:01 PM

## 2022-02-28 ENCOUNTER — OFFICE VISIT (OUTPATIENT)
Dept: OBGYN CLINIC | Facility: MEDICAL CENTER | Age: 67
End: 2022-02-28

## 2022-02-28 VITALS
SYSTOLIC BLOOD PRESSURE: 126 MMHG | TEMPERATURE: 97.6 F | BODY MASS INDEX: 38.08 KG/M2 | DIASTOLIC BLOOD PRESSURE: 72 MMHG | HEART RATE: 76 BPM | HEIGHT: 71 IN | WEIGHT: 272 LBS

## 2022-02-28 DIAGNOSIS — Z98.890 S/P ARTHROSCOPY OF LEFT KNEE: Primary | ICD-10-CM

## 2022-02-28 DIAGNOSIS — M17.12 PRIMARY OSTEOARTHRITIS OF LEFT KNEE: ICD-10-CM

## 2022-02-28 PROCEDURE — 99024 POSTOP FOLLOW-UP VISIT: CPT | Performed by: ORTHOPAEDIC SURGERY

## 2022-02-28 PROCEDURE — 3008F BODY MASS INDEX DOCD: CPT | Performed by: FAMILY MEDICINE

## 2022-02-28 RX ORDER — CELECOXIB 200 MG/1
200 CAPSULE ORAL DAILY
Qty: 30 CAPSULE | Refills: 0 | Status: SHIPPED | OUTPATIENT
Start: 2022-02-28

## 2022-02-28 NOTE — PROGRESS NOTES
Assessment/Plan:  1  S/P arthroscopy of left knee    2  Primary osteoarthritis of left knee      Orders Placed This Encounter   Procedures    Ambulatory referral to Physical Therapy       Start PT: Order was given out today   Prescribed Celebrex 200 mg prn , medications warnings were reviewed with patient  Continue ASA for DVT prophylaxis for one more week   Pictures from surgery reviewed  May consider CSI if knee is inflamed from PT     Return in about 4 weeks (around 3/28/2022) for PO Left knee scope  I answered all of the patient's questions during the visit and provided education of the patient's condition during the visit  The patient verbalized understanding of the information given and agrees with the plan  This note was dictated using [employer] software  It may contain errors including improperly dictated words  Please contact physician directly for any questions  Subjective   Chief Complaint:   Chief Complaint   Patient presents with    Left Knee - Ricardo Krys is a 74 y o  male who presents for 6 days   follow up s/p left knee arthroscopy PMM , DOS 2/22/22  He states he is doing well  He discontinued using the crutches  He notes some pain with flexion of knee over anterolateral aspect of the left knee  He is on the  for DVT prophylaxis  He stopped taking the Oxycodone  He is taking Aleve as needed for pain  Review of Systems  ROS:    See HPI for musculoskeletal review     All other systems reviewed are negative     History:  Past Medical History:   Diagnosis Date    Arthritis     b/l knees    today 2/22/2022  L knee scope    Colon polyp     CPAP (continuous positive airway pressure) dependence     Diabetes mellitus ([address] )     GERD (gastroesophageal reflux disease)     Hyperlipidemia     Hypertension     Internal derangement of knee involving medial meniscus     Resolved 6/5/2015     Lyme disease     Resolved 11/11/2016     Sleep apnea      Past Surgical History:   Procedure Laterality Date    ARTHROSCOPY KNEE Left 2/22/2022    Procedure: ARTHROSCOPY KNEE PARTIAL MEDIAL MENISCECTOMY;  Surgeon: Pablo Maddox DO;  Location: [facility];  Service: Orthopedics    COLONOSCOPY  11/06/2020    All locations appeared normal, no evidence of polyps or masses, small internal hemorrhoids, 5 year recall November 2025    KNEE ARTHROSCOPY Right 1991    UPPER GASTROINTESTINAL ENDOSCOPY  11/06/2020    Normal esophagus, normal stomach (large J-shaped stomach but grossly normal), normal duodenum  Biopsies negative for celiac disease, metaplasia, dysplasia, H pylori, positive for reactive gastropathy       Social History   Social History     Substance and Sexual Activity   Alcohol Use Yes    Alcohol/week: 2 0 standard drinks    Types: 2 Cans of beer per week    Comment: 1 x monthly     Social History     Substance and Sexual Activity   Drug Use No     Social History     Tobacco Use   Smoking Status Never Smoker   Smokeless Tobacco Never Used     Family History:   Family History   Problem Relation Age of Onset    Cancer Mother     Colon cancer Mother     Diabetes Father     Heart disease Father     Hypertension Father     Cancer Sister        Meds/Allergies   (Not in a hospital admission)    No Known Allergies       Objective     /72   Pulse 76   Temp 97 6 °F (36 4 °C)   Ht 5' 10 5" (1 791 m)   Wt 123 kg (272 lb)   BMI 38 48 kg/m²      PE:  AAOx 3  WDWN  Hearing intact, no drainage from eyes  no audible wheezing  no abdominal distension  LE compartments soft, AT/GS intact    Ortho Exam:  left Knee:   INC: C/D/I, No erythema, mild swelling  AROM:3-90       Scribe Attestation    I,:  Enzo Sloan am acting as a scribe while in the presence of the attending physician :       I,:  Pablo Maddox DO personally performed the services described in this documentation    as scribed in my presence :

## 2022-03-02 ENCOUNTER — EVALUATION (OUTPATIENT)
Dept: PHYSICAL THERAPY | Facility: CLINIC | Age: 67
End: 2022-03-02
Payer: COMMERCIAL

## 2022-03-02 DIAGNOSIS — G89.29 CHRONIC PAIN OF LEFT KNEE: Primary | ICD-10-CM

## 2022-03-02 DIAGNOSIS — M25.562 CHRONIC PAIN OF LEFT KNEE: Primary | ICD-10-CM

## 2022-03-02 DIAGNOSIS — Z98.890 S/P ARTHROSCOPY OF LEFT KNEE: ICD-10-CM

## 2022-03-02 PROCEDURE — 97110 THERAPEUTIC EXERCISES: CPT | Performed by: PHYSICAL THERAPIST

## 2022-03-02 PROCEDURE — 97161 PT EVAL LOW COMPLEX 20 MIN: CPT | Performed by: PHYSICAL THERAPIST

## 2022-03-02 NOTE — PROGRESS NOTES
PT Evaluation     Today's date: 3/2/2022  Patient name: Sweetie Ji  : 1955  MRN: [id number]  Referring provider: Reinier Stephenson  Dx:   Encounter Diagnosis     ICD-10-CM    1  Chronic pain of left knee  M25 562     G89 29    2  S/P arthroscopy of left knee  Z98 890 Ambulatory referral to Physical Therapy                  Assessment  Assessment details: Perez Burgos is a 74year old male presenting to physical therapy s/p left knee partial medial menisectomy performed by Dr Toño stahl on 22  Patient presents with pain, decreased strength, decreased range of motion and decreased tolerance to activity  Due to these impairments patient has difficulty performing activities of daily living, recreational activities and engaging in social activities  Patient would benefit from skilled physical therapy services to address these impairments and to maximize function  Thank you for the referral    Impairments: abnormal gait, abnormal or restricted ROM, activity intolerance, lacks appropriate home exercise program, pain with function and weight-bearing intolerance  Understanding of Dx/Px/POC: excellent  Goals  Impairment Goals:  1 ) Pt will have decreased sx at worst (during flexion) by 50% in 2-4 weeks  2 ) Pt will have improved knee range of motion by 20 degrees flexion and full extension in 2-4 weeks  3 ) Pt will have improved knee strength by 1/2 MMT throughout in 4-6 weeks  Functional Goals:  1 ) Pt will be independent in their home exercise program in 1 week  2 ) Pt will be able to traverse stairs without pain in 3-4 weeks  3 ) Pt will be able to walk for 20 minutes without pain in 3-4 weeks  4 ) Pt will have an improved FOTO score of 75/100 in 6-8 weeks  5 ) Pt will be able to return to work on 22 without issue or pain        Plan  Patient would benefit from: skilled PT  Planned therapy interventions: joint mobilization, manual therapy, patient education, therapeutic exercise, gait training, flexibility, home exercise program, behavior modification, neuromuscular re-education, activity modification, graded activity and graded exercise  Frequency: 2x week  Duration in weeks: 8        Subjective Evaluation    History of Present Illness  Mechanism of injury: Ricardo García is a 74year old male presenting to physical therapy s/p left knee partial medial menisectomy performed by Dr Toño stahl on 22  He was initially using crutches but no longer needs to at this point  He has some pain over the front and outside of the left knee when bending it but otherwise has been feeling very well  He describes the pain as tight and achy  He does not have any pain at rest, an improvement since surgery  He walks his dog every day around the block at this point  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, lots of walking  His return to work date is set for 22  Aggs: knee bending, squats, fully straightening knee  Eases: ice, rest  Pain  Current pain ratin  At best pain ratin  At worst pain ratin          Objective     Observations   Left Knee   Positive for edema  Negative for effusion and incision  Additional Observation Details  No evidence of infection, incision sites appear to be healing well    Active Range of Motion   Left Knee   Flexion: 92 degrees with pain  Extension: -5 degrees with pain    Passive Range of Motion   Left Knee   Flexion: 95 degrees   Extension: -1 degrees     Strength/Myotome Testing     Left Knee   Flexion: 4-  Extension: 4-    Swelling     Left Knee Girth Measurement (cm)   Joint line: 46 5 cm  10 cm above joint line: 49 5 cm  10 cm below joint line: 43 5 cm    Right Knee Girth Measurement (cm)   Joint line: 46 cm  10 cm above joint line: 48 cm  10 cm below joint line: 42 cm    Ambulation   Weight-Bearing Status   Weight-Bearing Status (Left): weight-bearing as tolerated     Observational Gait   Increased left swing time   Decreased walking speed and left stance time                Precautions: s/p left knee partial medial menisectomy on 2/22/22, HTN, DM      Manuals 3/2            PROM L Knee                                                    Neuro Re-Ed             SLS             Wobble board             NBOS foam                                                                 Ther Ex             Seated knee flexion 10x10''            Supine knee flexion w/ opp assist 10x10''            LAQ 2x10            HR 2x10            Quad sets 10x10''            SLR             Bridges             Recumbent bike                                                                 Ther Activity                                       Gait Training                                       Modalities

## 2022-03-07 ENCOUNTER — OFFICE VISIT (OUTPATIENT)
Dept: PHYSICAL THERAPY | Facility: CLINIC | Age: 67
End: 2022-03-07
Payer: COMMERCIAL

## 2022-03-07 DIAGNOSIS — M25.562 CHRONIC PAIN OF LEFT KNEE: ICD-10-CM

## 2022-03-07 DIAGNOSIS — Z98.890 S/P ARTHROSCOPY OF LEFT KNEE: Primary | ICD-10-CM

## 2022-03-07 DIAGNOSIS — G89.29 CHRONIC PAIN OF LEFT KNEE: ICD-10-CM

## 2022-03-07 PROCEDURE — 97140 MANUAL THERAPY 1/> REGIONS: CPT | Performed by: PHYSICAL THERAPIST

## 2022-03-07 PROCEDURE — 97110 THERAPEUTIC EXERCISES: CPT | Performed by: PHYSICAL THERAPIST

## 2022-03-07 NOTE — PROGRESS NOTES
Daily Note     Today's date: 3/7/2022  Patient name: Neelam Fam  : 1955  MRN: [id number]  Referring provider: Avery Hwang,*  Dx:   Encounter Diagnosis     ICD-10-CM    1  S/P arthroscopy of left knee  Z98 890    2  Chronic pain of left knee  M25 562     G89 29                   Subjective: Candace Knight notes that his knee is feeling much better today  His knee flexion has been improving greatly  Objective: See treatment diary below      Assessment: Tolerated treatment well  Patient demonstrated fatigue post treatment  Cueing to correct squats with equal weight shifting throughout  Slight discomfort during recumbent bike, will trial again nv  Updated HEP to reflect changes made this session  Plan: Continue per plan of care        Precautions: s/p left knee partial medial menisectomy on 22, HTN, DM      Manuals 3/2 3/7           PROM L Knee  RN                                                  Neuro Re-Ed             SLS             Wobble board  2x10 fwd/bck           NBOS foam                                                                 Ther Ex             Seated knee flexion 10x10'' HEP           Supine knee flexion w/ opp assist 10x10'' HEP           LAQ 2x10 2x10           HR 2x10 2x10           Quad sets 10x10'' HEP           SLR  x10           Bridges             Recumbent bike  P! nv          Heel slides  10x10''           SLR hip abduction  2x10                                     Ther Activity             Step up and over  2x10 lvl 2                        Gait Training                                       Modalities

## 2022-03-10 ENCOUNTER — OFFICE VISIT (OUTPATIENT)
Dept: PHYSICAL THERAPY | Facility: CLINIC | Age: 67
End: 2022-03-10
Payer: COMMERCIAL

## 2022-03-10 DIAGNOSIS — Z98.890 S/P ARTHROSCOPY OF LEFT KNEE: Primary | ICD-10-CM

## 2022-03-10 DIAGNOSIS — M25.562 CHRONIC PAIN OF LEFT KNEE: ICD-10-CM

## 2022-03-10 DIAGNOSIS — G89.29 CHRONIC PAIN OF LEFT KNEE: ICD-10-CM

## 2022-03-10 PROCEDURE — 97110 THERAPEUTIC EXERCISES: CPT | Performed by: PHYSICAL THERAPIST

## 2022-03-10 PROCEDURE — 97140 MANUAL THERAPY 1/> REGIONS: CPT | Performed by: PHYSICAL THERAPIST

## 2022-03-10 NOTE — PROGRESS NOTES
Daily Note     Today's date: 3/10/2022  Patient name: Yon Franklin  : 1955  MRN: [id number]  Referring provider: Rodolfo Ford,*  Dx:   Encounter Diagnosis     ICD-10-CM    1  S/P arthroscopy of left knee  Z98 890    2  Chronic pain of left knee  M25 562     G89 29                   Subjective: Shayy Bernard explains that his left hip has been bothering him since Monday  Objective: See treatment diary below      Assessment: Tolerated treatment well  Patient demonstrated fatigue post treatment and exhibited good technique with therapeutic exercises  Tenderness noted to left trochanteric bursa, likely experiencing some bursitis as a result for recent surgery  Able to complete full session without last hip discomfort, added in semi tandem stance on foam with good challenge during  Plan: Continue per plan of care  Precautions: s/p left knee partial medial menisectomy on 22, HTN, DM      Manuals 3/2 3/7 3/10          PROM L Knee  RN RN                                                 Neuro Re-Ed             SLS   5x15''          Wobble board  2x10 fwd/bck 2x10 fwd/bck          NBOS foam             Semi tandem foam   2x30'' EO, x30'' EC                                                 Ther Ex             Seated knee flexion 10x10'' HEP           Supine knee flexion w/ opp assist 10x10'' HEP           LAQ 2x10 2x10 2x10 Inc nv         HR 2x10 2x10 2x10          Quad sets 10x10'' HEP           SLR  x10 2x10          Bridges   2x10          Recumbent bike  P!  5' lvl 0          Heel slides  10x10'' 10x10''          SLR hip abduction  2x10 2x10          Mini squats   2x10                       Ther Activity             Step up and over  2x10 lvl 2 2x10 lvl 2                       Gait Training                                       Modalities

## 2022-03-14 ENCOUNTER — OFFICE VISIT (OUTPATIENT)
Dept: PHYSICAL THERAPY | Facility: CLINIC | Age: 67
End: 2022-03-14
Payer: COMMERCIAL

## 2022-03-14 DIAGNOSIS — G89.29 CHRONIC PAIN OF LEFT KNEE: ICD-10-CM

## 2022-03-14 DIAGNOSIS — Z98.890 S/P ARTHROSCOPY OF LEFT KNEE: Primary | ICD-10-CM

## 2022-03-14 DIAGNOSIS — M25.562 CHRONIC PAIN OF LEFT KNEE: ICD-10-CM

## 2022-03-14 PROCEDURE — 97140 MANUAL THERAPY 1/> REGIONS: CPT | Performed by: PHYSICAL THERAPIST

## 2022-03-14 PROCEDURE — 97110 THERAPEUTIC EXERCISES: CPT | Performed by: PHYSICAL THERAPIST

## 2022-03-14 NOTE — PROGRESS NOTES
Daily Note     Today's date: 3/14/2022  Patient name: Vilma Milligan  : 1955  MRN: [id number]  Referring provider: Sharmila August,*  Dx:   Encounter Diagnosis     ICD-10-CM    1  S/P arthroscopy of left knee  Z98 890    2  Chronic pain of left knee  M25 562     G89 29                   Subjective: [employer] explains that the outside of his left knee is feeling a little sore in swollen  Objective: See treatment diary below      Assessment: Tolerated treatment well  Patient demonstrated fatigue post treatment  [employer] responded well to all TE this session, added in resistance to hamstring curls and long arc quads, good fatigue felt upon completion  Improved eccentric control during step overs  Plan: Continue per plan of care  Precautions: s/p left knee partial medial menisectomy on 22, HTN, DM      Manuals 3/2 3/7 3/10 3/14         PROM L Knee  RN RN RN                                                Neuro Re-Ed             SLS   5x15'' 5x15''         Wobble board  2x10 fwd/bck 2x10 fwd/bck 2x10 fwd/bck         NBOS foam             Semi tandem foam   2x30'' EO, x30'' EC 2x10'' EO, x30'' EC                                                Ther Ex             Seated knee flexion 10x10'' HEP           Supine knee flexion w/ opp assist 10x10'' HEP           LAQ 2x10 2x10 2x10 2x10, 3#         Standing HS curls    2x10, 3#         HR 2x10 2x10 2x10 2x10         Quad sets 10x10'' HEP           SLR  x10 2x10 2x10         Bridges   2x10 2x10         Recumbent bike  P!  5' lvl 0 5' lvl 0         Heel slides  10x10'' 10x10''          SLR hip abduction  2x10 2x10 2x10         Mini squats   2x10 2x10         Leg press    nv         Ther Activity             Step up and over  2x10 lvl 2 2x10 lvl 2 2x10 lvl 2         Ball squats             Gait Training                                       Modalities

## 2022-03-17 ENCOUNTER — OFFICE VISIT (OUTPATIENT)
Dept: PHYSICAL THERAPY | Facility: CLINIC | Age: 67
End: 2022-03-17
Payer: COMMERCIAL

## 2022-03-17 DIAGNOSIS — Z98.890 S/P ARTHROSCOPY OF LEFT KNEE: ICD-10-CM

## 2022-03-17 DIAGNOSIS — G89.29 CHRONIC PAIN OF LEFT KNEE: Primary | ICD-10-CM

## 2022-03-17 DIAGNOSIS — M25.562 CHRONIC PAIN OF LEFT KNEE: Primary | ICD-10-CM

## 2022-03-17 PROCEDURE — 97140 MANUAL THERAPY 1/> REGIONS: CPT

## 2022-03-17 PROCEDURE — 97110 THERAPEUTIC EXERCISES: CPT

## 2022-03-21 ENCOUNTER — OFFICE VISIT (OUTPATIENT)
Dept: PHYSICAL THERAPY | Facility: CLINIC | Age: 67
End: 2022-03-21
Payer: COMMERCIAL

## 2022-03-21 DIAGNOSIS — G89.29 CHRONIC PAIN OF LEFT KNEE: Primary | ICD-10-CM

## 2022-03-21 DIAGNOSIS — Z98.890 S/P ARTHROSCOPY OF LEFT KNEE: ICD-10-CM

## 2022-03-21 DIAGNOSIS — M25.562 CHRONIC PAIN OF LEFT KNEE: Primary | ICD-10-CM

## 2022-03-21 PROCEDURE — 97140 MANUAL THERAPY 1/> REGIONS: CPT | Performed by: PHYSICAL THERAPIST

## 2022-03-21 PROCEDURE — 97110 THERAPEUTIC EXERCISES: CPT | Performed by: PHYSICAL THERAPIST

## 2022-03-21 NOTE — PROGRESS NOTES
Daily Note     Today's date: 3/21/2022  Patient name: Brenda Hager  : 1955  MRN: [id number]  Referring provider: Joseph Lester  Dx:   Encounter Diagnosis     ICD-10-CM    1  Chronic pain of left knee  M25 562     G89 29    2  S/P arthroscopy of left knee  Z98 890                   Subjective: Mansi Alvarez explains that he has some soreness on the lateral part of his left knee particularly when he wakes up, believes it may be a result from sleeping position  Objective: See treatment diary below      Assessment: Tolerated treatment well  Patient demonstrated fatigue post treatment  Mansi Alvarez noted a "click" during resisted LAQ, no pain during completion  Able to complete leg press with only fatigue upon completion, no pain during session  Step up and overs proved to be less painful, progress to level 3 nv  Plan: Continue per plan of care  Precautions: s/p left knee partial medial menisectomy on 22, HTN, DM      Manuals 3/2 3/7 3/10 3/14 3/17 3/21       PROM L Knee  RN RN RN DL RN                                              Neuro Re-Ed             SLS   5x15'' 5x15'' 15"x5 5x15''       Wobble board  2x10 fwd/bck 2x10 fwd/bck 2x10 fwd/bck x20 ea 2x10 fwd/bck       NBOS foam             Semi tandem foam   2x30'' EO, x30'' EC 2x10'' EO, x30'' EC 30"x1 ea foot eo/ec 30"x1 ea foot eo/ec                                              Ther Ex             Seated knee flexion 10x10'' HEP           Supine knee flexion w/ opp assist 10x10'' HEP           LAQ 2x10 2x10 2x10 2x10, 3# 3#  2x10 2x10, 3#       Standing HS curls    2x10, 3# 3#  2x10 2x10, 3#       HR 2x10 2x10 2x10 2x10 2x10 2x10       Quad sets 10x10'' HEP           SLR  x10 2x10 2x10 2x10 2x10       Bridges   2x10 2x10 2x10 2x10       Recumbent bike  P!  5' lvl 0 5' lvl 0 lv0 7' 7' lvl 0       Heel slides  10x10'' 10x10''          SLR hip abduction  2x10 2x10 2x10 2x10 2x10       Mini squats   2x10 2x10 2x10 2x10       Leg press    nv nv 2x10, 75#       SL leg press      2x10, 75#       Ther Activity             Step up and over  2x10 lvl 2 2x10 lvl 2 2x10 lvl 2 lv2 x20  2x10, lvl 2 [employer] squats             Gait Training                                       Modalities

## 2022-03-24 ENCOUNTER — OFFICE VISIT (OUTPATIENT)
Dept: PHYSICAL THERAPY | Facility: CLINIC | Age: 67
End: 2022-03-24
Payer: COMMERCIAL

## 2022-03-24 DIAGNOSIS — Z98.890 S/P ARTHROSCOPY OF LEFT KNEE: ICD-10-CM

## 2022-03-24 DIAGNOSIS — G89.29 CHRONIC PAIN OF LEFT KNEE: Primary | ICD-10-CM

## 2022-03-24 DIAGNOSIS — M25.562 CHRONIC PAIN OF LEFT KNEE: Primary | ICD-10-CM

## 2022-03-24 PROCEDURE — 97112 NEUROMUSCULAR REEDUCATION: CPT | Performed by: PHYSICAL THERAPIST

## 2022-03-24 PROCEDURE — 97110 THERAPEUTIC EXERCISES: CPT | Performed by: PHYSICAL THERAPIST

## 2022-03-24 NOTE — PROGRESS NOTES
Daily Note     Today's date: 3/24/2022  Patient name: Araon Brown  : 1955  MRN: [id number]  Referring provider: Kate Hernandez  Dx:   Encounter Diagnosis     ICD-10-CM    1  Chronic pain of left knee  M25 562     G89 29    2  S/P arthroscopy of left knee  Z98 890                   Subjective: Bart Epps explains that his left knee is stiff and sore this morning  Objective: See treatment diary below      Assessment: Tolerated treatment well  Patient demonstrated fatigue post treatment and exhibited good technique with therapeutic exercises  Updated hep to include ball squats, increased resistance on leg press and seated strengthening today  Fatigue upon completion of session  Plan: Continue per plan of care  Precautions: s/p left knee partial medial menisectomy on 22, HTN, DM      Manuals 3/2 3/7 3/10 3/14 3/17 3/21 3/24      PROM L Knee  RN RN RN DL RN RN                                             Neuro Re-Ed             SLS   5x15'' 5x15'' 15"x5 5x15'' 5x15''      Wobble board  2x10 fwd/bck 2x10 fwd/bck 2x10 fwd/bck x20 ea 2x10 fwd/bck 2x10 fwd/bck      NBOS foam             Semi tandem foam   2x30'' EO, x30'' EC 2x10'' EO, x30'' EC 30"x1 ea foot eo/ec 30"x1 ea foot eo/ec 30"x1 ea foot eo/ec                                             Ther Ex             Seated knee flexion 10x10'' HEP           Supine knee flexion w/ opp assist 10x10'' HEP           LAQ 2x10 2x10 2x10 2x10, 3# 3#  2x10 2x10, 3# 2x10, 5#      Standing HS curls    2x10, 3# 3#  2x10 2x10, 3# 2x10, 5#      HR 2x10 2x10 2x10 2x10 2x10 2x10 2x10      Quad sets 10x10'' HEP           SLR  x10 2x10 2x10 2x10 2x10 2x10      Bridges   2x10 2x10 2x10 2x10 2x10      Recumbent bike  P!  5' lvl 0 5' lvl 0 lv0 7' 7' lvl 0 7' lvl 1      Heel slides  10x10'' 10x10''          SLR hip abduction  2x10 2x10 2x10 2x10 2x10 2x10      Mini squats   2x10 2x10 2x10 2x10 dc      Leg press    nv nv 2x10, 75# 2x10, 100#      SL leg press 2x10, 75# 2x10, 75#      Ther Activity             Step up and over  2x10 lvl 2 2x10 lvl 2 2x10 lvl 2 lv2 x20  2x10, lvl 2 2x10 lvl 3      Ball squats       2x10      Gait Training                                       Modalities

## 2022-03-28 ENCOUNTER — OFFICE VISIT (OUTPATIENT)
Dept: PHYSICAL THERAPY | Facility: CLINIC | Age: 67
End: 2022-03-28
Payer: COMMERCIAL

## 2022-03-28 DIAGNOSIS — G89.29 CHRONIC PAIN OF LEFT KNEE: Primary | ICD-10-CM

## 2022-03-28 DIAGNOSIS — Z98.890 S/P ARTHROSCOPY OF LEFT KNEE: ICD-10-CM

## 2022-03-28 DIAGNOSIS — M25.562 CHRONIC PAIN OF LEFT KNEE: Primary | ICD-10-CM

## 2022-03-28 PROCEDURE — 97110 THERAPEUTIC EXERCISES: CPT | Performed by: PHYSICAL THERAPIST

## 2022-03-28 PROCEDURE — 97112 NEUROMUSCULAR REEDUCATION: CPT | Performed by: PHYSICAL THERAPIST

## 2022-03-28 NOTE — PROGRESS NOTES
Daily Note     Today's date: 3/28/2022  Patient name: Lesley Martini  : 1955  MRN: [id number]  Referring provider: Romelia Villaseñor  Dx:   Encounter Diagnosis     ICD-10-CM    1  Chronic pain of left knee  M25 562     G89 29    2  S/P arthroscopy of left knee  Z98 890                   Subjective: Aaron Barefoot explains ongoing stiffness in the morning but feeling good overall  Objective: See treatment diary below      Assessment: Tolerated treatment well  Patient demonstrated fatigue post treatment  Improvement in endurance with less rest breaks noted during session, control during eccentric lowering on step has also improved  Plan to begin more weight bearing TE in a continued return to maximal level of function  Plan: Continue per plan of care  Precautions: s/p left knee partial medial menisectomy on 22, HTN, DM      Manuals 3/2 3/7 3/10 3/14 3/17 3/21 3/24 3/28     PROM L Knee  RN RN RN DL RN RN RN                                            Neuro Re-Ed             SLS   5x15'' 5x15'' 15"x5 5x15'' 5x15'' 5x15''     Wobble board  2x10 fwd/bck 2x10 fwd/bck 2x10 fwd/bck x20 ea 2x10 fwd/bck 2x10 fwd/bck 2x10 fwd/back     NBOS foam             Semi tandem foam   2x30'' EO, x30'' EC 2x10'' EO, x30'' EC 30"x1 ea foot eo/ec 30"x1 ea foot eo/ec 30"x1 ea foot eo/ec 30"x1 ea foot eo/ec     Monster walks        nv     SLS ball toss        nv                  Ther Ex             Seated knee flexion 10x10'' HEP           Supine knee flexion w/ opp assist 10x10'' HEP           LAQ 2x10 2x10 2x10 2x10, 3# 3#  2x10 2x10, 3# 2x10, 5# 2x10, 5#     Standing HS curls    2x10, 3# 3#  2x10 2x10, 3# 2x10, 5# 2x10, 5#     HR 2x10 2x10 2x10 2x10 2x10 2x10 2x10 2x10     Quad sets 10x10'' HEP           SLR  x10 2x10 2x10 2x10 2x10 2x10 2x10 Inc nv    Bridges   2x10 2x10 2x10 2x10 2x10 2x10     Recumbent bike  P!  5' lvl 0 5' lvl 0 lv0 7' 7' lvl 0 7' lvl 1 7' lvl 1     Heel slides  10x10'' 10x10''          SLR hip abduction  2x10 2x10 2x10 2x10 2x10 2x10 2x10     Mini squats   2x10 2x10 2x10 2x10 dc      Leg press    nv nv 2x10, 75# 2x10, 100# 2x10, 100#     SL leg press      2x10, 75# 2x10, 75# 2x10, 75#     Ther Activity             Step up and over  2x10 lvl 2 2x10 lvl 2 2x10 lvl 2 lv2 x20  2x10, lvl 2 2x10 lvl 3 2x10 lvl 3     Ball squats       2x10 2x10     Gait Training                                       Modalities

## 2022-03-31 ENCOUNTER — OFFICE VISIT (OUTPATIENT)
Dept: PHYSICAL THERAPY | Facility: CLINIC | Age: 67
End: 2022-03-31
Payer: COMMERCIAL

## 2022-03-31 DIAGNOSIS — G89.29 CHRONIC PAIN OF LEFT KNEE: Primary | ICD-10-CM

## 2022-03-31 DIAGNOSIS — M25.562 CHRONIC PAIN OF LEFT KNEE: Primary | ICD-10-CM

## 2022-03-31 DIAGNOSIS — Z98.890 S/P ARTHROSCOPY OF LEFT KNEE: ICD-10-CM

## 2022-03-31 PROCEDURE — 97112 NEUROMUSCULAR REEDUCATION: CPT | Performed by: PHYSICAL THERAPIST

## 2022-03-31 PROCEDURE — 97110 THERAPEUTIC EXERCISES: CPT | Performed by: PHYSICAL THERAPIST

## 2022-03-31 NOTE — PROGRESS NOTES
Daily Note     Today's date: 3/31/2022  Patient name: Barak Al  : 1955  MRN: [id number]  Referring provider: Asa Olson  Dx:   Encounter Diagnosis     ICD-10-CM    1  Chronic pain of left knee  M25 562     G89 29    2  S/P arthroscopy of left knee  Z98 890                   Subjective: Yonathan Colby explains normal tightness this morning  Objective: See treatment diary below      Assessment: Tolerated treatment well  Patient demonstrated fatigue post treatment  Yonathan Colby responded well to all changes this session, including BOSU lunges and ball toss in narrow base of support  Good fatigue and challenge noted during session, hamstring cramping at conclusion of session which subsided with stretch  Plan: Continue per plan of care  Precautions: s/p left knee partial medial menisectomy on 22, HTN, DM      Manuals 3/2 3/7 3/10 3/14 3/17 3/21 3/24 3/28 3/31    PROM L Knee  RN RN RN DL RN RN RN                                            Neuro Re-Ed             SLS   5x15'' 5x15'' 15"x5 5x15'' 5x15'' 5x15''     Wobble board  2x10 fwd/bck 2x10 fwd/bck 2x10 fwd/bck x20 ea 2x10 fwd/bck 2x10 fwd/bck 2x10 fwd/back 2x10 fwd/back    NBOS foam             Semi tandem foam   2x30'' EO, x30'' EC 2x10'' EO, x30'' EC 30"x1 ea foot eo/ec 30"x1 ea foot eo/ec 30"x1 ea foot eo/ec 30"x1 ea foot eo/ec     Monster walks        nv nv    Semi tandem ball toss        nv Ylw, x15 fwd + ea 45 deg    BOSU ball lunges         2x10 front + side    Ther Ex             Seated knee flexion 10x10'' HEP           Supine knee flexion w/ opp assist 10x10'' HEP           LAQ 2x10 2x10 2x10 2x10, 3# 3#  2x10 2x10, 3# 2x10, 5# 2x10, 5# 2x10, 5#    Standing HS curls    2x10, 3# 3#  2x10 2x10, 3# 2x10, 5# 2x10, 5# 2x10, 5#    HR 2x10 2x10 2x10 2x10 2x10 2x10 2x10 2x10     Quad sets 10x10'' HEP           SLR  x10 2x10 2x10 2x10 2x10 2x10 2x10 Inc nv    Bridges   2x10 2x10 2x10 2x10 2x10 2x10 2x10    Recumbent bike  P!  5' lvl 0 5' lvl 0 lv0 7' 7' lvl 0 7' lvl 1 7' lvl 1 7' lvl 1    Heel slides  10x10'' 10x10''          SLR hip abduction  2x10 2x10 2x10 2x10 2x10 2x10 2x10 2x10    Mini squats   2x10 2x10 2x10 2x10 dc      Leg press    nv nv 2x10, 75# 2x10, 100# 2x10, 100# 2x10, 145#    SL leg press      2x10, 75# 2x10, 75# 2x10, 75# 2x10, 85#    Ther Activity             Step up and over  2x10 lvl 2 2x10 lvl 2 2x10 lvl 2 lv2 x20  2x10, lvl 2 2x10 lvl 3 2x10 lvl 3     Ball squats       2x10 2x10 2x10    Gait Training                                       Modalities

## 2022-04-01 ENCOUNTER — OFFICE VISIT (OUTPATIENT)
Dept: OBGYN CLINIC | Facility: MEDICAL CENTER | Age: 67
End: 2022-04-01

## 2022-04-01 VITALS
DIASTOLIC BLOOD PRESSURE: 75 MMHG | WEIGHT: 277.6 LBS | BODY MASS INDEX: 38.86 KG/M2 | SYSTOLIC BLOOD PRESSURE: 119 MMHG | HEIGHT: 71 IN | HEART RATE: 72 BPM

## 2022-04-01 DIAGNOSIS — Z98.890 STATUS POST ARTHROSCOPY OF LEFT KNEE: Primary | ICD-10-CM

## 2022-04-01 PROCEDURE — 99024 POSTOP FOLLOW-UP VISIT: CPT | Performed by: ORTHOPAEDIC SURGERY

## 2022-04-01 NOTE — PROGRESS NOTES
Assessment/Plan:  1  Status post arthroscopy of left knee      No orders of the defined types were placed in this encounter  · Patient is doing well status post left knee arthroscopy PMM on 2/22/22  · Continue PT  Patient may need several more weeks to reach his goals and improve with navigating stairs  He may transition to home exercise program once comfortable  · Continue celebrex prn pain  · He may have a steroid injection in the future if needed  Return if symptoms worsen or fail to improve  I answered all of the patient's questions during the visit and provided education of the patient's condition during the visit  The patient verbalized understanding of the information given and agrees with the plan  This note was dictated using [employer] software  It may contain errors including improperly dictated words  Please contact physician directly for any questions  Subjective   Chief Complaint:   Chief Complaint   Patient presents with    Left Knee - Post-op, Follow-up       HPI  [place] is a 74 y o  male who presents for follow up status post left knee arthroscopy PMM on 2/22/22  Patient states he feels he is improving over time  He notes his knee is still sore when held in flexion or when going down the stairs  He is attending PT twice per week  He still has this scheduled for a few more weeks  Patient will take celebrex as needed for pain with relief  He completed ASA  Overall he feels his pain has improved since prior to surgery  Review of Systems  ROS:    See HPI for musculoskeletal review     All other systems reviewed are negative     History:  Past Medical History:   Diagnosis Date    Arthritis     b/l knees    today 2/22/2022  L knee scope    Colon polyp     CPAP (continuous positive airway pressure) dependence     Diabetes mellitus (HCC)     GERD (gastroesophageal reflux disease)     Hyperlipidemia     Hypertension     Internal derangement of knee involving medial meniscus     Resolved 6/5/2015     Lyme disease     Resolved 11/11/2016     Sleep apnea      Past Surgical History:   Procedure Laterality Date    ARTHROSCOPY KNEE Left 2/22/2022    Procedure: ARTHROSCOPY KNEE PARTIAL MEDIAL MENISCECTOMY;  Surgeon: Shamar Meeks DO;  Location: [facility];  Service: Orthopedics    COLONOSCOPY  11/06/2020    All locations appeared normal, no evidence of polyps or masses, small internal hemorrhoids, 5 year recall November 2025    KNEE ARTHROSCOPY Right 1991    UPPER GASTROINTESTINAL ENDOSCOPY  11/06/2020    Normal esophagus, normal stomach (large J-shaped stomach but grossly normal), normal duodenum  Biopsies negative for celiac disease, metaplasia, dysplasia, H pylori, positive for reactive gastropathy  Social History   Social History     Substance and Sexual Activity   Alcohol Use Yes    Alcohol/week: 2 0 standard drinks    Types: 2 Cans of beer per week    Comment: 1 x monthly     Social History     Substance and Sexual Activity   Drug Use No     Social History     Tobacco Use   Smoking Status Never Smoker   Smokeless Tobacco Never Used     Family History:   Family History   Problem Relation Age of Onset    Cancer Mother     Colon cancer Mother     Diabetes Father     Heart disease Father     Hypertension Father     Cancer Sister        Current Outpatient Medications on File Prior to Visit   Medication Sig Dispense Refill    amLODIPine (NORVASC) 10 mg tablet Take 1 tablet (10 mg total) by mouth daily 90 tablet 1    aspirin (ECOTRIN) 325 mg EC tablet Take 1 tablet (325 mg total) by mouth 2 (two) times a day Take with food   28 tablet 0    celecoxib (CeleBREX) 200 mg capsule Take 1 capsule (200 mg total) by mouth daily PRN for pain 30 capsule 0    lisinopril (ZESTRIL) 40 mg tablet Take 1 tablet (40 mg total) by mouth daily 90 tablet 1    loratadine (CLARITIN) 10 mg tablet Take 1 tablet by mouth as needed        metFORMIN (GLUCOPHAGE) 500 mg tablet Take 1 tablet (500 mg total) by mouth 2 (two) times a day with meals 180 tablet 0    Multiple Vitamin (ONE-A-DAY MENS PO) Take by mouth in the morning      naproxen sodium (Aleve) 220 MG tablet       omeprazole (PriLOSEC) 20 mg delayed release capsule Take 20 mg by mouth daily      oxyCODONE (ROXICODONE) 5 immediate release tablet Take 1 tablet (5 mg total) by mouth every 6 (six) hours as needed for severe pain Max Daily Amount: 20 mg 15 tablet 0    promethazine (PHENERGAN) 12 5 MG tablet Take 1 tablet (12 5 mg total) by mouth every 6 (six) hours as needed for nausea or vomiting 4 tablet 0     No current facility-administered medications on file prior to visit       No Known Allergies     Objective     /75   Pulse 72   Ht 5' 10 5" (1 791 m)   Wt 126 kg (277 lb 9 6 oz)   BMI 39 27 kg/m²      PE:  AAOx 3  WDWN  Hearing intact, no drainage from eyes  no audible wheezing  no abdominal distension  LE compartments soft, skin intact    Ortho Exam:  left Knee:   No erythema  no swelling  no effusion  no warmth  Incisions healed  AROM: 0- 130  Stable to varus/valgus stress

## 2022-04-01 NOTE — PROGRESS NOTES
Assessment/Plan:  No diagnosis found  No orders of the defined types were placed in this encounter  No follow-ups on file  I answered all of the patient's questions during the visit and provided education of the patient's condition during the visit  The patient verbalized understanding of the information given and agrees with the plan  This note was dictated using [employer] software  It may contain errors including improperly dictated words  Please contact physician directly for any questions  Subjective   Chief Complaint:   Chief Complaint   Patient presents with    Left Knee - Post-op, Follow-up       HPI  Yvonne Benson is a 74 y o  male who presents for follow up for     Review of Systems  ROS:    See HPI for musculoskeletal review  All other systems reviewed are negative     History:  Past Medical History:   Diagnosis Date    Arthritis     b/l knees    today 2/22/2022  L knee scope    Colon polyp     CPAP (continuous positive airway pressure) dependence     Diabetes mellitus ([facility] [address] )     GERD (gastroesophageal reflux disease)     Hyperlipidemia     Hypertension     Internal derangement of knee involving medial meniscus     Resolved 6/5/2015     Lyme disease     Resolved 11/11/2016     Sleep apnea      Past Surgical History:   Procedure Laterality Date    ARTHROSCOPY KNEE Left 2/22/2022    Procedure: ARTHROSCOPY KNEE PARTIAL MEDIAL MENISCECTOMY;  Surgeon: Bridger Rapp DO;  Location: [facility];  Service: Orthopedics    COLONOSCOPY  11/06/2020    All locations appeared normal, no evidence of polyps or masses, small internal hemorrhoids, 5 year recall November 2025    KNEE ARTHROSCOPY Right 1991    UPPER GASTROINTESTINAL ENDOSCOPY  11/06/2020    Normal esophagus, normal stomach (large J-shaped stomach but grossly normal), normal duodenum  Biopsies negative for celiac disease, metaplasia, dysplasia, H pylori, positive for reactive gastropathy       Social History   Social History     Substance and Sexual Activity   Alcohol Use Yes    Alcohol/week: 2 0 standard drinks    Types: 2 Cans of beer per week    Comment: 1 x monthly     Social History     Substance and Sexual Activity   Drug Use No     Social History     Tobacco Use   Smoking Status Never Smoker   Smokeless Tobacco Never Used     Family History:   Family History   Problem Relation Age of Onset    Cancer Mother     Colon cancer Mother     Diabetes Father     Heart disease Father     Hypertension Father     Cancer Sister        Current Outpatient Medications on File Prior to Visit   Medication Sig Dispense Refill    amLODIPine (NORVASC) 10 mg tablet Take 1 tablet (10 mg total) by mouth daily 90 tablet 1    aspirin (ECOTRIN) 325 mg EC tablet Take 1 tablet (325 mg total) by mouth 2 (two) times a day Take with food  28 tablet 0    celecoxib (CeleBREX) 200 mg capsule Take 1 capsule (200 mg total) by mouth daily PRN for pain 30 capsule 0    lisinopril (ZESTRIL) 40 mg tablet Take 1 tablet (40 mg total) by mouth daily 90 tablet 1    loratadine (CLARITIN) 10 mg tablet Take 1 tablet by mouth as needed        metFORMIN (GLUCOPHAGE) 500 mg tablet Take 1 tablet (500 mg total) by mouth 2 (two) times a day with meals 180 tablet 0    Multiple Vitamin (ONE-A-DAY MENS PO) Take by mouth in the morning      naproxen sodium (Aleve) 220 MG tablet       omeprazole (PriLOSEC) 20 mg delayed release capsule Take 20 mg by mouth daily      oxyCODONE (ROXICODONE) 5 immediate release tablet Take 1 tablet (5 mg total) by mouth every 6 (six) hours as needed for severe pain Max Daily Amount: 20 mg 15 tablet 0    promethazine (PHENERGAN) 12 5 MG tablet Take 1 tablet (12 5 mg total) by mouth every 6 (six) hours as needed for nausea or vomiting 4 tablet 0     No current facility-administered medications on file prior to visit       No Known Allergies     Objective     /75   Pulse 72   Ht 5' 10 5" (1 791 m)   Wt 126 kg (277 lb 9 6 oz) BMI 39 27 kg/m²      PE:  AAOx 3  WDWN  Hearing intact, no drainage from eyes  no audible wheezing  no abdominal distension  LE compartments soft, skin intact    Ortho Exam:  {right/left/bilateral:39542} Knee:   No erythema  no swelling  no effusion  no warmth  AROM: full  Stable to varus/valgus stress    Imaging Studies: {Results Review Statement:93415}  *** {right/left/bilateral:21223} knee:

## 2022-04-04 ENCOUNTER — OFFICE VISIT (OUTPATIENT)
Dept: PHYSICAL THERAPY | Facility: CLINIC | Age: 67
End: 2022-04-04
Payer: COMMERCIAL

## 2022-04-04 DIAGNOSIS — M25.562 CHRONIC PAIN OF LEFT KNEE: Primary | ICD-10-CM

## 2022-04-04 DIAGNOSIS — G89.29 CHRONIC PAIN OF LEFT KNEE: Primary | ICD-10-CM

## 2022-04-04 DIAGNOSIS — Z98.890 S/P ARTHROSCOPY OF LEFT KNEE: ICD-10-CM

## 2022-04-04 PROCEDURE — 97110 THERAPEUTIC EXERCISES: CPT

## 2022-04-04 NOTE — PROGRESS NOTES
Daily Note     Today's date: 2022  Patient name: Rosemarie Wiley  : 1955  MRN: [id number]  Referring provider: Linda Grover  Dx:   Encounter Diagnosis     ICD-10-CM    1  Chronic pain of left knee  M25 562     G89 29    2  S/P arthroscopy of left knee  Z98 890                   Subjective: Patient noted no knee pain pre treatment  Objective: See treatment diary below      Assessment: Tolerated treatment fair  Added in monster walks without complaints and patient noted no difficulty increased TB to green TB NV  Patient exhibited good technique with therapeutic exercises      Plan: Continue per plan of care  Precautions: s/p left knee partial medial menisectomy on 22, HTN, DM      Manuals 3/2 3/7 3/10 3/14 3/17 3/21 3/24 3/28 3/31 4/4   PROM L Knee  RN RN RN DL RN RN RN                                            Neuro Re-Ed             SLS   5x15'' 5x15'' 15"x5 5x15'' 5x15'' 5x15''  5x 15"   Wobble board  2x10 fwd/bck 2x10 fwd/bck 2x10 fwd/bck x20 ea 2x10 fwd/bck 2x10 fwd/bck 2x10 fwd/back 2x10 fwd/back 2x10 fwd back   NBOS foam             Semi tandem foam   2x30'' EO, x30'' EC 2x10'' EO, x30'' EC 30"x1 ea foot eo/ec 30"x1 ea foot eo/ec 30"x1 ea foot eo/ec 30"x1 ea foot eo/ec     Monster walks        nv nv RTB 3 laps at  [employer] tandem ball toss        nv Ylw, x15 fwd + ea 45 deg Ylw, x15 fwd + ea 45 deg   BOSU ball lunges         2x10 front + side x10 front lat   Ther Ex             Seated knee flexion 10x10'' HEP           Supine knee flexion w/ opp assist 10x10'' HEP           LAQ 2x10 2x10 2x10 2x10, 3# 3#  2x10 2x10, 3# 2x10, 5# 2x10, 5# 2x10, 5# 2x10   Standing HS curls    2x10, 3# 3#  2x10 2x10, 3# 2x10, 5# 2x10, 5# 2x10, 5# 2x10   HR 2x10 2x10 2x10 2x10 2x10 2x10 2x10 2x10  2x10   Quad sets 10x10'' HEP           SLR  x10 2x10 2x10 2x10 2x10 2x10 2x10 Inc nv    Bridges   2x10 2x10 2x10 2x10 2x10 2x10 2x10 2x10   Recumbent bike  P!  5' lvl 0 5' lvl 0 lv0 7' 7' lvl 0 7' lvl 1 7' lvl 1 7' lvl 1 7' lvl 1   Heel slides  10x10'' 10x10''          SLR hip abduction  2x10 2x10 2x10 2x10 2x10 2x10 2x10 2x10 2x10   Mini squats   2x10 2x10 2x10 2x10 dc      Leg press    nv nv 2x10, 75# 2x10, 100# 2x10, 100# 2x10, 145# 2x10, 145#   SL leg press      2x10, 75# 2x10, 75# 2x10, 75# 2x10, 85# 2x10, 95#   Ther Activity             Step up and over  2x10 lvl 2 2x10 lvl 2 2x10 lvl 2 lv2 x20  2x10, lvl 2 2x10 lvl 3 2x10 lvl 3  x10 lvl 3    Ball squats       2x10 2x10 2x10 2x10   Gait Training                                       Modalities

## 2022-04-07 ENCOUNTER — OFFICE VISIT (OUTPATIENT)
Dept: PHYSICAL THERAPY | Facility: CLINIC | Age: 67
End: 2022-04-07
Payer: COMMERCIAL

## 2022-04-07 DIAGNOSIS — Z98.890 S/P ARTHROSCOPY OF LEFT KNEE: ICD-10-CM

## 2022-04-07 DIAGNOSIS — G89.29 CHRONIC PAIN OF LEFT KNEE: Primary | ICD-10-CM

## 2022-04-07 DIAGNOSIS — M25.562 CHRONIC PAIN OF LEFT KNEE: Primary | ICD-10-CM

## 2022-04-07 PROCEDURE — 97112 NEUROMUSCULAR REEDUCATION: CPT | Performed by: PHYSICAL THERAPIST

## 2022-04-07 PROCEDURE — 97110 THERAPEUTIC EXERCISES: CPT | Performed by: PHYSICAL THERAPIST

## 2022-04-07 NOTE — PROGRESS NOTES
Daily Note     Today's date: 2022  Patient name: Michelle Corbin  : 1955  MRN: [id number]  Referring provider: Suman Delgado  Dx:   Encounter Diagnosis     ICD-10-CM    1  Chronic pain of left knee  M25 562     G89 29    2  S/P arthroscopy of left knee  Z98 890                   Subjective: Prema Lang explains that his knee is feeling well today  However, he felt it almost "give way" while twisting on it earlier this morning  Objective: See treatment diary below      Assessment: Tolerated treatment well  Patient demonstrated fatigue post treatment and exhibited good technique with therapeutic exercises  Prema Lang responded well to all TE this session with challenge and fatigue during tandem walking  He is scheduled 2x for next week, will assess and determine appropriate course of treatment nv  Plan: Continue per plan of care        Precautions: s/p left knee partial medial menisectomy on 22, HTN, DM      Manuals 4/7 3/7 3/10 3/14 3/17 3/21 3/24 3/28 3/31 4/4   PROM L Knee  RN RN RN DL RN RN RN                                            Neuro Re-Ed             SLS 5x15''  5x15'' 5x15'' 15"x5 5x15'' 5x15'' 5x15''  5x 15"   Wobble board 2x10 fwd/bck 2x10 fwd/bck 2x10 fwd/bck 2x10 fwd/bck x20 ea 2x10 fwd/bck 2x10 fwd/bck 2x10 fwd/back 2x10 fwd/back 2x10 fwd back   NBOS foam             Semi tandem foam   2x30'' EO, x30'' EC 2x10'' EO, x30'' EC 30"x1 ea foot eo/ec 30"x1 ea foot eo/ec 30"x1 ea foot eo/ec 30"x1 ea foot eo/ec     Monster walks        nv nv RTB 3 laps at  [employer] tandem ball toss Ylw, x15 fwd + ea 45 deg       nv Ylw, x15 fwd + ea 45 deg Ylw, x15 fwd + ea 45 deg   BOSU ball lunges x10 font lat        2x10 front + side x10 front lat   Tandem walks x3            Ther Ex             Seated knee flexion 10x10'' HEP           Supine knee flexion w/ opp assist 10x10'' HEP           LAQ 2x10, 5# 2x10 2x10 2x10, 3# 3#  2x10 2x10, 3# 2x10, 5# 2x10, 5# 2x10, 5# 2x10   Standing HS curls 2x10, 5#   2x10, 3# 3#  2x10 2x10, 3# 2x10, 5# 2x10, 5# 2x10, 5# 2x10   HR 2x10 2x10 2x10 2x10 2x10 2x10 2x10 2x10  2x10   Quad sets 10x10'' HEP           SLR  x10 2x10 2x10 2x10 2x10 2x10 2x10 Inc nv    Bridges 2x10  2x10 2x10 2x10 2x10 2x10 2x10 2x10 2x10   Recumbent bike 7' lvl 1 P!  5' lvl 0 5' lvl 0 lv0 7' 7' lvl 0 7' lvl 1 7' lvl 1 7' lvl 1 7' lvl 1   Heel slides  10x10'' 10x10''          SLR hip abduction 2x10 2x10 2x10 2x10 2x10 2x10 2x10 2x10 2x10 2x10   Mini squats   2x10 2x10 2x10 2x10 dc      Leg press 2x10, 145#   nv nv 2x10, 75# 2x10, 100# 2x10, 100# 2x10, 145# 2x10, 145#   SL leg press 2x10, 95#     2x10, 75# 2x10, 75# 2x10, 75# 2x10, 85# 2x10, 95#   Ther Activity             Step up and over x10 lvl 3 2x10 lvl 2 2x10 lvl 2 2x10 lvl 2 lv2 x20  2x10, lvl 2 2x10 lvl 3 2x10 lvl 3  x10 lvl 3    Ball squats 2x10      2x10 2x10 2x10 2x10   Gait Training                                       Modalities

## 2022-04-11 ENCOUNTER — OFFICE VISIT (OUTPATIENT)
Dept: PHYSICAL THERAPY | Facility: CLINIC | Age: 67
End: 2022-04-11
Payer: COMMERCIAL

## 2022-04-11 DIAGNOSIS — M25.562 CHRONIC PAIN OF LEFT KNEE: Primary | ICD-10-CM

## 2022-04-11 DIAGNOSIS — G89.29 CHRONIC PAIN OF LEFT KNEE: Primary | ICD-10-CM

## 2022-04-11 DIAGNOSIS — Z98.890 S/P ARTHROSCOPY OF LEFT KNEE: ICD-10-CM

## 2022-04-11 PROCEDURE — 97110 THERAPEUTIC EXERCISES: CPT | Performed by: PHYSICAL THERAPIST

## 2022-04-11 PROCEDURE — 97140 MANUAL THERAPY 1/> REGIONS: CPT | Performed by: PHYSICAL THERAPIST

## 2022-04-11 NOTE — PROGRESS NOTES
Daily Note     Today's date: 2022  Patient name: Bell Corley  : 1955  MRN: [id number]  Referring provider: Kristofer Blum  Dx:   Encounter Diagnosis     ICD-10-CM    1  Chronic pain of left knee  M25 562     G89 29    2  S/P arthroscopy of left knee  Z98 890                   Subjective: Hernan Kim explains that his knee is feeling pretty good  He plans on hitting a bucket of balls later today  Objective: See treatment diary below      Assessment: Tolerated treatment well  Patient demonstrated fatigue post treatment  Galetonjigna Kim responded well to all TE, will assess response after hitting golf balls today  Potential DC nv       Plan: Continue per plan of care        Precautions: s/p left knee partial medial menisectomy on 22, HTN, DM      Manuals 4/7 4/11      3/28 3/31 4/4   PROM L Knee  RN      RN                                            Neuro Re-Ed             SLS 5x15'' 5x15''      5x15''  5x 15"   Wobble board 2x10 fwd/bck 2x10 fwd/bck      2x10 fwd/back 2x10 fwd/back 2x10 fwd back   NBOS foam             Semi tandem foam        30"x1 ea foot eo/ec     Monster walks        nv nv RTB 3 laps at  [employer] tandem ball toss Ylw, x15 fwd + ea 45 deg       nv Ylw, x15 fwd + ea 45 deg Ylw, x15 fwd + ea 45 deg   BOSU ball lunges x10 font lat        2x10 front + side x10 front lat   Tandem walks x3            Ther Ex             Seated knee flexion 10x10'' HEP           Supine knee flexion w/ opp assist 10x10'' HEP           LAQ 2x10, 5# 2x10      2x10, 5# 2x10, 5# 2x10   Standing HS curls 2x10, 5#       2x10, 5# 2x10, 5# 2x10   HR 2x10 2x10      2x10  2x10   Quad sets 10x10'' HEP           SLR  2x10      2x10 Inc nv    Bridges 2x10 2x10      2x10 2x10 2x10   Recumbent bike 7' lvl 1 7' lvl 1      7' lvl 1 7' lvl 1 7' lvl 1   Heel slides             SLR hip abduction 2x10 2x10      2x10 2x10 2x10   Mini squats             Leg press 2x10, 145# 2x10, 145#      2x10, 100# 2x10, 145# 2x10, 145# SL leg press 2x10, 95# 2x10, 95#      2x10, 75# 2x10, 85# 2x10, 95#   Ther Activity             Step up and over x10 lvl 3       2x10 lvl 3  x10 lvl 3    Ball squats 2x10       2x10 2x10 2x10   Gait Training                                       Modalities

## 2022-04-14 ENCOUNTER — APPOINTMENT (OUTPATIENT)
Dept: PHYSICAL THERAPY | Facility: CLINIC | Age: 67
End: 2022-04-14
Payer: COMMERCIAL

## 2022-04-22 ENCOUNTER — OFFICE VISIT (OUTPATIENT)
Dept: FAMILY MEDICINE CLINIC | Facility: CLINIC | Age: 67
End: 2022-04-22
Payer: COMMERCIAL

## 2022-04-22 VITALS
HEIGHT: 71 IN | OXYGEN SATURATION: 96 % | HEART RATE: 78 BPM | TEMPERATURE: 97.3 F | DIASTOLIC BLOOD PRESSURE: 78 MMHG | BODY MASS INDEX: 38.02 KG/M2 | WEIGHT: 271.6 LBS | RESPIRATION RATE: 18 BRPM | SYSTOLIC BLOOD PRESSURE: 124 MMHG

## 2022-04-22 DIAGNOSIS — E11.9 TYPE 2 DIABETES MELLITUS WITHOUT COMPLICATION, WITHOUT LONG-TERM CURRENT USE OF INSULIN (HCC): Primary | ICD-10-CM

## 2022-04-22 DIAGNOSIS — I10 PRIMARY HYPERTENSION: ICD-10-CM

## 2022-04-22 DIAGNOSIS — D49.2 SKIN NEOPLASM: ICD-10-CM

## 2022-04-22 LAB — SL AMB POCT HEMOGLOBIN AIC: 6.1 (ref ?–6.5)

## 2022-04-22 PROCEDURE — 3044F HG A1C LEVEL LT 7.0%: CPT | Performed by: FAMILY MEDICINE

## 2022-04-22 PROCEDURE — 83036 HEMOGLOBIN GLYCOSYLATED A1C: CPT | Performed by: FAMILY MEDICINE

## 2022-04-22 PROCEDURE — 99214 OFFICE O/P EST MOD 30 MIN: CPT | Performed by: FAMILY MEDICINE

## 2022-04-22 NOTE — PROGRESS NOTES
Assessment/Plan:     Diagnoses and all orders for this visit:    Type 2 diabetes mellitus without complication, without long-term current use of insulin ([facility])  -     POCT hemoglobin A1c    Primary hypertension    Skin neoplasm      patient sugars have decreased significantly and now at 6 1   Will stop his metformin   He will continue his other medications   His blood pressure is well controlled today to discussed diet exercise weight loss   Patient has multiple skin lesions on his neck and axilla that will be removed in upcoming visits      Subjective:     Chief Complaint   Patient presents with    Diabetes     3 month follow up  Pt reports he has lost some weight, is feeling well, no new issues  Patient ID: Alan Meeks is a 74 y o  male  Lv Terrell is a 75 y/o male who presents to the office for a routine follow up visit with no health complaints  He had arthroscopy of the left knee on  2/22/2022 for a torn meniscus  No erythema or swelling noted  He denies pain or complications  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  Full range of motion in affected joint  Two small scars on left knee from surgery with no signs of infection  Would like a recommendation to a dermatologist  To evaluate and treat skin tags to neck, trunk, and legs  Intermittent cough noted while in the office related to seasonal allergies managed with Claritin  The following portions of the patient's history were reviewed and updated as appropriate: allergies, current medications, past family history, past medical history, past social history, past surgical history and problem list     Review of Systems   Constitutional: Negative  Negative for chills and fever  HENT: Negative  Negative for congestion, ear pain, hearing loss, sinus pressure and sore throat  Intermittent non productive cough related to seasonal allergies  Eyes: Negative  Negative for photophobia, pain and visual disturbance     Respiratory: Negative  Negative for cough, chest tightness and shortness of breath  Cardiovascular: Negative  Negative for chest pain and palpitations  Gastrointestinal: Negative  Negative for abdominal pain, constipation, diarrhea, nausea and vomiting  Endocrine: Negative  Negative for cold intolerance, polydipsia and polyphagia  Genitourinary: Negative  Negative for dysuria and hematuria  Musculoskeletal: Negative  Negative for arthralgias, back pain, gait problem and joint swelling  Skin: Negative  Negative for color change and rash  Allergic/Immunologic: Negative  Negative for environmental allergies, food allergies and immunocompromised state  Neurological: Negative  Negative for dizziness, seizures, syncope and headaches  Hematological: Negative  Psychiatric/Behavioral: Negative  Negative for agitation  The patient is not nervous/anxious  All other systems reviewed and are negative  Objective:    Vitals:    04/22/22 0852   BP: 124/78   BP Location: Left arm   Patient Position: Sitting   Cuff Size: Large   Pulse: 78   Resp: 18   Temp: (!) 97 3 °F (36 3 °C)   TempSrc: Tympanic   SpO2: 96%   Weight: 123 kg (271 lb 9 6 oz)   Height: 5' 10 5" (1 791 m)          Physical Exam  Vitals and nursing note reviewed  Constitutional:       General: He is not in acute distress  Appearance: He is well-developed  He is obese  HENT:      Head: Normocephalic and atraumatic  Right Ear: Tympanic membrane, ear canal and external ear normal       Left Ear: Tympanic membrane, ear canal and external ear normal       Ears:      Comments: Some mild irritation to the right ear canal  Denies pain  Nose: Nose normal       Mouth/Throat:      Mouth: Mucous membranes are moist    Eyes:      General:         Right eye: No discharge  Left eye: No discharge  Extraocular Movements: Extraocular movements intact        Conjunctiva/sclera: Conjunctivae normal       Pupils: Pupils are equal, round, and reactive to light  Comments: Mild redness to left sclera  Reports itchiness  Cardiovascular:      Rate and Rhythm: Normal rate and regular rhythm  Pulses: Normal pulses  Heart sounds: Normal heart sounds  Pulmonary:      Effort: Pulmonary effort is normal       Breath sounds: Normal breath sounds  Abdominal:      General: Bowel sounds are normal  There is no distension  Palpations: Abdomen is soft  Tenderness: There is no abdominal tenderness  Musculoskeletal:         General: Normal range of motion  Cervical back: Normal range of motion and neck supple  Skin:     General: Skin is warm and dry  Capillary Refill: Capillary refill takes less than 2 seconds  Findings: No rash  Comments: Multiple acrochordons on axilla neck and torso   Neurological:      Mental Status: He is alert and oriented to person, place, and time  Cranial Nerves: No cranial nerve deficit  Psychiatric:         Mood and Affect: Mood normal          Behavior: Behavior normal          Thought Content:  Thought content normal

## 2022-04-27 ENCOUNTER — OFFICE VISIT (OUTPATIENT)
Dept: FAMILY MEDICINE CLINIC | Facility: CLINIC | Age: 67
End: 2022-04-27
Payer: COMMERCIAL

## 2022-04-27 VITALS
DIASTOLIC BLOOD PRESSURE: 76 MMHG | BODY MASS INDEX: 38.56 KG/M2 | SYSTOLIC BLOOD PRESSURE: 124 MMHG | TEMPERATURE: 98.3 F | HEIGHT: 71 IN | WEIGHT: 275.4 LBS | RESPIRATION RATE: 20 BRPM | HEART RATE: 75 BPM | OXYGEN SATURATION: 92 %

## 2022-04-27 DIAGNOSIS — L91.8 INFLAMED ACROCHORDON: ICD-10-CM

## 2022-04-27 DIAGNOSIS — D49.2 SKIN NEOPLASM: Primary | ICD-10-CM

## 2022-04-27 PROCEDURE — 99213 OFFICE O/P EST LOW 20 MIN: CPT | Performed by: FAMILY MEDICINE

## 2022-04-27 PROCEDURE — 1036F TOBACCO NON-USER: CPT | Performed by: FAMILY MEDICINE

## 2022-04-27 PROCEDURE — 3078F DIAST BP <80 MM HG: CPT | Performed by: FAMILY MEDICINE

## 2022-04-27 PROCEDURE — 3074F SYST BP LT 130 MM HG: CPT | Performed by: FAMILY MEDICINE

## 2022-04-27 PROCEDURE — 3008F BODY MASS INDEX DOCD: CPT | Performed by: FAMILY MEDICINE

## 2022-04-27 NOTE — PROGRESS NOTES
Assessment/Plan:       Diagnoses and all orders for this visit:    Skin neoplasm    Inflamed acrochordon      multiple inflamed skin lesions as well as skin tags have a removed from his neck   Without complication  Patient did get dizzy after a few of them and the procedure was halted due to this   He recovered fine and was able to leave without any symptoms he will follow-up in a few weeks to remove the rest of the ones that were not able to be removed today      Subjective:     Chief Complaint   Patient presents with    Skin tag removal        Patient ID: Moisés Schulte is a 74 y o  male  Patient presents today to have skin tags and other skin lesions removed   Patient has multiple growths throughout his neck area that are catching on his collar size and becoming bleeding and inflamed      The following portions of the patient's history were reviewed and updated as appropriate: allergies, current medications, past family history, past medical history, past social history, past surgical history and problem list     Review of Systems   Constitutional: Negative  HENT: Negative  Eyes: Negative  Respiratory: Negative  Cardiovascular: Negative  Gastrointestinal: Negative  Endocrine: Negative  Genitourinary: Negative  Musculoskeletal: Negative  Skin: Negative  Allergic/Immunologic: Negative  Neurological: Negative  Hematological: Negative  Psychiatric/Behavioral: Negative  All other systems reviewed and are negative  Objective:    Vitals:    04/27/22 1348   BP: 124/76   BP Location: Left arm   Patient Position: Sitting   Cuff Size: Large   Pulse: 75   Resp: 20   Temp: 98 3 °F (36 8 °C)   TempSrc: Tympanic   SpO2: 92%   Weight: 125 kg (275 lb 6 4 oz)   Height: 5' 10 5" (1 791 m)          Physical Exam  Vitals and nursing note reviewed  Constitutional:       General: He is not in acute distress  Appearance: He is well-developed     HENT:      Head: Normocephalic  Right Ear: External ear normal       Left Ear: External ear normal       Nose: Nose normal    Eyes:      General:         Right eye: No discharge  Left eye: No discharge  Conjunctiva/sclera: Conjunctivae normal       Pupils: Pupils are equal, round, and reactive to light  Cardiovascular:      Rate and Rhythm: Normal rate and regular rhythm  Heart sounds: Normal heart sounds  Pulmonary:      Effort: Pulmonary effort is normal       Breath sounds: Normal breath sounds  Abdominal:      General: Bowel sounds are normal  There is no distension  Palpations: Abdomen is soft  Tenderness: There is no abdominal tenderness  Musculoskeletal:         General: Normal range of motion  Cervical back: Normal range of motion  Skin:     General: Skin is warm and dry  Findings: No rash  Comments: Multiple skin tags and skin growths around his neck  Multiple ones are inflamed and irritated   Neurological:      Mental Status: He is alert and oriented to person, place, and time  Cranial Nerves: No cranial nerve deficit  Psychiatric:         Behavior: Behavior normal          Thought Content:  Thought content normal          Judgment: Judgment normal

## 2022-04-28 ENCOUNTER — TELEPHONE (OUTPATIENT)
Dept: OBGYN CLINIC | Facility: HOSPITAL | Age: 67
End: 2022-04-28

## 2022-04-28 NOTE — TELEPHONE ENCOUNTER
Patient is calling to request a return to work note  Patient plans to return on Tuesday 05/17/2022      Patient would like to be able to obtain the note via [employer]    Please advise     Simran Lee [phone]

## 2022-04-28 NOTE — TELEPHONE ENCOUNTER
Work note was placed in the system stating he could return on 05/17 without restrictions  Patient was made aware that he can get this from my chart at this time

## 2022-05-12 ENCOUNTER — OFFICE VISIT (OUTPATIENT)
Dept: FAMILY MEDICINE CLINIC | Facility: CLINIC | Age: 67
End: 2022-05-12
Payer: COMMERCIAL

## 2022-05-12 VITALS
RESPIRATION RATE: 18 BRPM | SYSTOLIC BLOOD PRESSURE: 122 MMHG | OXYGEN SATURATION: 96 % | TEMPERATURE: 99.4 F | HEIGHT: 71 IN | WEIGHT: 275.2 LBS | BODY MASS INDEX: 38.53 KG/M2 | HEART RATE: 93 BPM | DIASTOLIC BLOOD PRESSURE: 80 MMHG

## 2022-05-12 DIAGNOSIS — D23.9 BENIGN NEOPLASM OF SKIN, UNSPECIFIED LOCATION: Primary | ICD-10-CM

## 2022-05-12 PROCEDURE — 3079F DIAST BP 80-89 MM HG: CPT | Performed by: FAMILY MEDICINE

## 2022-05-12 PROCEDURE — 1036F TOBACCO NON-USER: CPT | Performed by: FAMILY MEDICINE

## 2022-05-12 PROCEDURE — 3008F BODY MASS INDEX DOCD: CPT | Performed by: FAMILY MEDICINE

## 2022-05-12 PROCEDURE — 99213 OFFICE O/P EST LOW 20 MIN: CPT | Performed by: FAMILY MEDICINE

## 2022-05-12 PROCEDURE — 3074F SYST BP LT 130 MM HG: CPT | Performed by: FAMILY MEDICINE

## 2022-05-12 NOTE — PROGRESS NOTES
Assessment/Plan:    No problem-specific Assessment & Plan notes found for this encounter  There are no diagnoses linked to this encounter  Subjective:     Chief Complaint   Patient presents with    Skin tags     Pt presents to the office for skin tag removal          Patient ID: Steve Pacheco is a 74 y o  male  Patient presents today for 2nd round of treatment for his multiple skin lesions around his axilla and neck  He has no acute complaints today      The following portions of the patient's history were reviewed and updated as appropriate: allergies, current medications, past family history, past medical history, past social history, past surgical history and problem list     Review of Systems   Constitutional: Negative  HENT: Negative  Eyes: Negative  Respiratory: Negative  Cardiovascular: Negative  Gastrointestinal: Negative  Endocrine: Negative  Genitourinary: Negative  Musculoskeletal: Negative  Skin: Negative  Multiple skin growths in neck and axilla area   Allergic/Immunologic: Negative  Neurological: Negative  Hematological: Negative  Psychiatric/Behavioral: Negative  All other systems reviewed and are negative  Objective:    Vitals:    05/12/22 1503   BP: 122/80   BP Location: Left arm   Patient Position: Sitting   Cuff Size: Large   Pulse: 93   Resp: 18   Temp: 99 4 °F (37 4 °C)   TempSrc: Tympanic   SpO2: 96%   Weight: 125 kg (275 lb 3 2 oz)   Height: 5' 10 5" (1 791 m)          Physical Exam  Vitals and nursing note reviewed  Constitutional:       General: He is not in acute distress  Appearance: He is well-developed  HENT:      Head: Normocephalic  Right Ear: External ear normal       Left Ear: External ear normal       Nose: Nose normal    Eyes:      General:         Right eye: No discharge  Left eye: No discharge        Conjunctiva/sclera: Conjunctivae normal       Pupils: Pupils are equal, round, and reactive to light  Cardiovascular:      Rate and Rhythm: Normal rate and regular rhythm  Heart sounds: Normal heart sounds  Pulmonary:      Effort: Pulmonary effort is normal       Breath sounds: Normal breath sounds  Abdominal:      General: Bowel sounds are normal  There is no distension  Palpations: Abdomen is soft  Tenderness: There is no abdominal tenderness  Musculoskeletal:         General: Normal range of motion  Cervical back: Normal range of motion  Skin:     General: Skin is warm and dry  Findings: No rash  Neurological:      Mental Status: He is alert and oriented to person, place, and time  Cranial Nerves: No cranial nerve deficit  Psychiatric:         Behavior: Behavior normal          Thought Content: Thought content normal          Judgment: Judgment normal            Lesion Destruction    Date/Time: 5/12/2022 5:15 PM  Performed by: Carol Panchal DO  Authorized by: Carol Panchal DO   Universal Protocol:  Consent: Verbal consent obtained  Consent given by: patient  Patient understanding: patient states understanding of the procedure being performed  Patient consent: the patient's understanding of the procedure matches consent given  Procedure consent: procedure consent matches procedure scheduled  Relevant documents: relevant documents present and verified  Test results: test results available and properly labeled  Site marked: the operative site was marked  Radiology Images displayed and confirmed   If images not available, report reviewed: imaging studies available      Procedure Details - Lesion Destruction:     Number of Lesions:  1  Lesion 1:     Body area:  Trunk    Trunk location:  R axilla    Malignancy: benign lesion       Patient had multiple skin lesions all removed in the same fashion with scissors as well as cryotherapy   Wounds were dressed wound care was discussed   Patient will follow-up in 2 weeks to rid remove the remaining lesions

## 2022-05-13 NOTE — PATIENT INSTRUCTIONS
We are treating this muscle strain now with muscle relaxers and anti-inflammatories/analgesics  Use of medications as written  Use a heating pad as tolerated  Follow up with family physician if you fail to improve  157.5

## 2022-06-03 ENCOUNTER — OFFICE VISIT (OUTPATIENT)
Dept: FAMILY MEDICINE CLINIC | Facility: CLINIC | Age: 67
End: 2022-06-03
Payer: COMMERCIAL

## 2022-06-03 VITALS
HEIGHT: 71 IN | BODY MASS INDEX: 38.39 KG/M2 | RESPIRATION RATE: 20 BRPM | DIASTOLIC BLOOD PRESSURE: 80 MMHG | OXYGEN SATURATION: 97 % | TEMPERATURE: 98.1 F | HEART RATE: 81 BPM | SYSTOLIC BLOOD PRESSURE: 110 MMHG | WEIGHT: 274.2 LBS

## 2022-06-03 DIAGNOSIS — L98.9 BENIGN SKIN LESION: Primary | ICD-10-CM

## 2022-06-03 PROCEDURE — 3079F DIAST BP 80-89 MM HG: CPT | Performed by: FAMILY MEDICINE

## 2022-06-03 PROCEDURE — 3074F SYST BP LT 130 MM HG: CPT | Performed by: FAMILY MEDICINE

## 2022-06-03 PROCEDURE — 3008F BODY MASS INDEX DOCD: CPT | Performed by: FAMILY MEDICINE

## 2022-06-03 PROCEDURE — 99213 OFFICE O/P EST LOW 20 MIN: CPT | Performed by: FAMILY MEDICINE

## 2022-06-03 PROCEDURE — 1036F TOBACCO NON-USER: CPT | Performed by: FAMILY MEDICINE

## 2022-06-03 NOTE — PROGRESS NOTES
Assessment/Plan:       Diagnoses and all orders for this visit:    Benign skin lesion    Other orders  -     Lesion Destruction      follow-up as needed  local wound care discussed   All problematic skin tags have been removed      Subjective:     Chief Complaint   Patient presents with    Skin Problem     Pt presents to the office for skin tag removal          Patient ID: Marcelle Turner is a 74 y o  male  Patient presents today for his 3rd treatment for multiple irritated and bleeding skin tags  No other complaints today      The following portions of the patient's history were reviewed and updated as appropriate: allergies, current medications, past family history, past medical history, past social history, past surgical history and problem list     Review of Systems   Constitutional: Negative  HENT: Negative  Eyes: Negative  Respiratory: Negative  Cardiovascular: Negative  Gastrointestinal: Negative  Endocrine: Negative  Genitourinary: Negative  Musculoskeletal: Negative  Skin: Negative  Allergic/Immunologic: Negative  Neurological: Negative  Hematological: Negative  Psychiatric/Behavioral: Negative  All other systems reviewed and are negative  Objective:    Vitals:    06/03/22 1022   BP: 110/80   BP Location: Left arm   Patient Position: Sitting   Cuff Size: Large   Pulse: 81   Resp: 20   Temp: 98 1 °F (36 7 °C)   TempSrc: Tympanic   SpO2: 97%   Weight: 124 kg (274 lb 3 2 oz)   Height: 5' 10 5" (1 791 m)          Physical Exam  Vitals and nursing note reviewed  Constitutional:       General: He is not in acute distress  Appearance: He is well-developed  HENT:      Head: Normocephalic  Right Ear: External ear normal       Left Ear: External ear normal       Nose: Nose normal    Eyes:      General:         Right eye: No discharge  Left eye: No discharge        Conjunctiva/sclera: Conjunctivae normal       Pupils: Pupils are equal, round, and reactive to light  Cardiovascular:      Rate and Rhythm: Normal rate and regular rhythm  Heart sounds: Normal heart sounds  Pulmonary:      Effort: Pulmonary effort is normal       Breath sounds: Normal breath sounds  Abdominal:      General: Bowel sounds are normal  There is no distension  Palpations: Abdomen is soft  Tenderness: There is no abdominal tenderness  Musculoskeletal:         General: Normal range of motion  Cervical back: Normal range of motion  Skin:     General: Skin is warm and dry  Findings: No rash  Comments: Multiple large irritated skin tags and right and left axilla   Neurological:      Mental Status: He is alert and oriented to person, place, and time  Cranial Nerves: No cranial nerve deficit  Psychiatric:         Behavior: Behavior normal          Thought Content: Thought content normal          Judgment: Judgment normal              Lesion Destruction    Date/Time: 6/3/2022 12:03 PM  Performed by: Jessica Paul DO  Authorized by: Jessica Paul DO   [place] Protocol:  Consent: Verbal consent obtained  Risks and benefits: risks, benefits and alternatives were discussed  Consent given by: patient  Patient understanding: patient states understanding of the procedure being performed  Patient consent: the patient's understanding of the procedure matches consent given  Procedure consent: procedure consent matches procedure scheduled  Relevant documents: relevant documents present and verified  Test results: test results available and properly labeled  Radiology Images displayed and confirmed   If images not available, report reviewed: imaging studies available         Patient with multiple problematic skin tags that were very large getting caught on skin becoming tender and bleeding   These were all removed without complications  These were not sent out as they are clearly benign for causing health issues  Local wound care was discussed and patient can follow up as needed

## 2022-06-13 DIAGNOSIS — I10 ESSENTIAL HYPERTENSION: ICD-10-CM

## 2022-06-13 PROCEDURE — 4010F ACE/ARB THERAPY RXD/TAKEN: CPT | Performed by: FAMILY MEDICINE

## 2022-06-13 RX ORDER — LISINOPRIL 40 MG/1
TABLET ORAL
Qty: 90 TABLET | Refills: 1 | Status: SHIPPED | OUTPATIENT
Start: 2022-06-13

## 2022-06-13 RX ORDER — AMLODIPINE BESYLATE 10 MG/1
TABLET ORAL
Qty: 90 TABLET | Refills: 1 | Status: SHIPPED | OUTPATIENT
Start: 2022-06-13

## 2022-09-20 ENCOUNTER — TELEMEDICINE (OUTPATIENT)
Dept: FAMILY MEDICINE CLINIC | Facility: CLINIC | Age: 67
End: 2022-09-20
Payer: COMMERCIAL

## 2022-09-20 ENCOUNTER — NURSE TRIAGE (OUTPATIENT)
Dept: OTHER | Facility: OTHER | Age: 67
End: 2022-09-20

## 2022-09-20 VITALS — HEIGHT: 71 IN | WEIGHT: 274 LBS | BODY MASS INDEX: 38.36 KG/M2

## 2022-09-20 DIAGNOSIS — U07.1 COVID-19 VIRUS INFECTION: Primary | ICD-10-CM

## 2022-09-20 PROCEDURE — 3725F SCREEN DEPRESSION PERFORMED: CPT | Performed by: FAMILY MEDICINE

## 2022-09-20 PROCEDURE — 99213 OFFICE O/P EST LOW 20 MIN: CPT | Performed by: FAMILY MEDICINE

## 2022-09-20 RX ORDER — NIRMATRELVIR AND RITONAVIR 300-100 MG
3 KIT ORAL 2 TIMES DAILY
Qty: 30 TABLET | Refills: 0 | Status: SHIPPED | OUTPATIENT
Start: 2022-09-20 | End: 2022-09-25

## 2022-09-20 NOTE — TELEPHONE ENCOUNTER
Patient is COVID positive and needs a virtual appointment  Patient advised to contact the office if he is not contacted by them by 10:30am      Reason for Disposition   HIGH RISK for severe COVID complications (e g , weak immune system, age > 59 years, obesity with BMI > 22, pregnant, chronic lung disease or other chronic medical condition)  (Exception: Already seen by PCP and no new or worsening symptoms )    Answer Assessment - Initial Assessment Questions  1  COVID-19 DIAGNOSIS: "Who made your COVID-19 diagnosis?" "Was it confirmed by a positive lab test or self-test?" If not diagnosed by a doctor (or NP/PA), ask "Are there lots of cases (community spread) where you live?" Note: See public health department website, if unsure  Tested positive Sunday 9/18 via home test   2  COVID-19 EXPOSURE: "Was there any known exposure to COVID before the symptoms began?" CDC Definition of close contact: within 6 feet (2 meters) for a total of 15 minutes or more over a 24-hour period  Yes   3  ONSET: "When did the COVID-19 symptoms start?"       Sunday   4  WORST SYMPTOM: "What is your worst symptom?" (e g , cough, fever, shortness of breath, muscle aches)      Cough   5  COUGH: "Do you have a cough?" If Yes, ask: "How bad is the cough?"        Yes, "It is not keeping me up at night but it is waking me up  I have just a little phlegm "   6  FEVER: "Do you have a fever?" If Yes, ask: "What is your temperature, how was it measured, and when did it start?"      Denies   7  RESPIRATORY STATUS: "Describe your breathing?" (e g , shortness of breath, wheezing, unable to speak)       "Good, but my chest feels heavy " denies chest pain   8  BETTER-SAME-WORSE: "Are you getting better, staying the same or getting worse compared to yesterday?"  If getting worse, ask, "In what way?"      Better   9   HIGH RISK DISEASE: "Do you have any chronic medical problems?" (e g , asthma, heart or lung disease, weak immune system, obesity, etc )      Diabetes   10  VACCINE: "Have you had the COVID-19 vaccine?" If Yes, ask: "Which one, how many shots, when did you get it?"       2 moderna   11  BOOSTER: "Have you received your COVID-19 booster?" If Yes, ask: "Which one and when did you get it?"       1 moderna   12  OTHER SYMPTOMS: "Do you have any other symptoms?"  (e g , chills, fatigue, headache, loss of smell or taste, muscle pain, sore throat)        Chills, fatigue, and body aches on Sunday, sore throat, congestion, runny nose  14   O2 SATURATION MONITOR:  "Do you use an oxygen saturation monitor (pulse oximeter) at home?" If Yes, ask "What is your reading (oxygen level) today?" "What is your usual oxygen saturation reading?" (e g , 95%)        Denies    Protocols used: CORONAVIRUS (COVID-19) DIAGNOSED OR SUSPECTED-[facility]

## 2022-09-20 NOTE — TELEPHONE ENCOUNTER
Regarding: Covid positive  ----- Message from Stuart Ike sent at 9/20/2022  7:39 AM EDT -----  "I tested positive for Covid and I would like to get a prescription for that so I can start feeling better "

## 2022-09-20 NOTE — PROGRESS NOTES
Virtual Regular Visit    Verification of patient location:    Patient is located in the following state in which I hold an active license [place]      Assessment/Plan:    Problem List Items Addressed This Visit    None     Visit Diagnoses     COVID-19 virus infection    -  Primary    Relevant Medications    nirmatrelvir & ritonavir (Paxlovid, 300/100,) tablet therapy pack        Meds as above   Discussed supportive care and symptomatic care   Follow up if worsens  BMI Counseling: Body mass index is 38 76 kg/m²  The BMI is above normal  Nutrition recommendations include decreasing portion sizes, encouraging healthy choices of fruits and vegetables, decreasing fast food intake, consuming healthier snacks, limiting drinks that contain sugar, moderation in carbohydrate intake, increasing intake of lean protein, reducing intake of saturated and trans fat and reducing intake of cholesterol  Exercise recommendations include exercising 3-5 times per week  Rationale for BMI follow-up plan is due to patient being overweight or obese  Depression Screening and Follow-up Plan: Patient was screened for depression during today's encounter  They screened negative with a PHQ-2 score of 0  Reason for visit is   Chief Complaint   Patient presents with    COVID-19     Positive 09/18  Chest congestion, sore throat, lots of coughing  Denies fever, headache, and loss of smell and taste   Virtual Regular Visit        Encounter provider Megha Taylor DO    Provider located at [address] [place] [id number] [phone]      Recent Visits  No visits were found meeting these conditions    Showing recent visits within past 7 days and meeting all other requirements  Today's Visits  Date Type Provider Dept   09/20/22 Telemedicine DO Sunil Silveira   Showing today's visits and meeting all other requirements  Future Appointments  No visits were found meeting these conditions  Showing future appointments within next 150 days and meeting all other requirements       The patient was identified by name and date of birth  Janay Peters was informed that this is a telemedicine visit and that the visit is being conducted through [address] [facility] and patient was informed that this is a secure, HIPAA-compliant platform  He agrees to proceed     My office door was closed  No one else was in the room  He acknowledged consent and understanding of privacy and security of the video platform  The patient has agreed to participate and understands they can discontinue the visit at any time  Patient is aware this is a billable service  Subjective  Janay Peters is a 74 y o  male presents today for COVID-19 infection         Patient presents today for COVID-19 infection   Patient tested positive a few days ago   He has cough congestion fatigue myalgias  He had a fever as well yesterday          Past Medical History:   Diagnosis Date    Arthritis     b/l knees    today 2/22/2022  L knee scope    Colon polyp     CPAP (continuous positive airway pressure) dependence     Diabetes mellitus ([address] )     GERD (gastroesophageal reflux disease)     Hyperlipidemia     Hypertension     Internal derangement of knee involving medial meniscus     Resolved 6/5/2015     Lyme disease     Resolved 11/11/2016     Obesity 2018    Sleep apnea        Past Surgical History:   Procedure Laterality Date    ARTHROSCOPY KNEE Left 2/22/2022    Procedure: ARTHROSCOPY KNEE PARTIAL MEDIAL MENISCECTOMY;  Surgeon: Anuj Dorsey DO;  Location: [facility];  Service: Orthopedics    COLONOSCOPY  11/06/2020    All locations appeared normal, no evidence of polyps or masses, small internal hemorrhoids, 5 year recall November 2025    KNEE ARTHROSCOPY Right 1991    UPPER GASTROINTESTINAL ENDOSCOPY  11/06/2020    Normal esophagus, normal stomach (large J-shaped stomach but grossly normal), normal duodenum  Biopsies negative for celiac disease, metaplasia, dysplasia, H pylori, positive for reactive gastropathy  Current Outpatient Medications   Medication Sig Dispense Refill    amLODIPine (NORVASC) 10 mg tablet TAKE 1 TABLET BY MOUTH EVERY DAY 90 tablet 1    lisinopril (ZESTRIL) 40 mg tablet TAKE 1 TABLET BY MOUTH EVERY DAY 90 tablet 1    loratadine (CLARITIN) 10 mg tablet Take 1 tablet by mouth as needed        naproxen sodium (ALEVE) 220 MG tablet       nirmatrelvir & ritonavir (Paxlovid, 300/100,) tablet therapy pack Take 3 tablets by mouth 2 (two) times a day for 5 days Take 2 nirmatrelvir tablets + 1 ritonavir tablet together per dose 30 tablet 0    omeprazole (PriLOSEC) 20 mg delayed release capsule Take 20 mg by mouth daily      aspirin (ECOTRIN) 325 mg EC tablet Take 1 tablet (325 mg total) by mouth 2 (two) times a day Take with food  (Patient not taking: Reported on 9/20/2022) 28 tablet 0    celecoxib (CeleBREX) 200 mg capsule Take 1 capsule (200 mg total) by mouth daily PRN for pain (Patient not taking: Reported on 9/20/2022) 30 capsule 0    Multiple Vitamin (ONE-A-DAY MENS PO) Take by mouth in the morning       No current facility-administered medications for this visit  Allergies   Allergen Reactions    Pollen Extract Allergic Rhinitis       Review of Systems   Constitutional: Positive for fatigue and fever  HENT: Positive for congestion  Eyes: Negative  Respiratory: Positive for cough  Cardiovascular: Negative  Gastrointestinal: Negative  Endocrine: Negative  Genitourinary: Negative  Musculoskeletal: Positive for myalgias  Skin: Negative  Allergic/Immunologic: Negative  Neurological: Negative  Hematological: Negative  Psychiatric/Behavioral: Negative  All other systems reviewed and are negative        Video Exam    Vitals:    09/20/22 1416   Weight: 124 kg (274 lb)   Height: 5' 10 5" (1 791 m)       Physical Exam  Vitals and nursing note reviewed  Constitutional:       General: He is not in acute distress  Appearance: He is well-developed  HENT:      Head: Normocephalic  Right Ear: External ear normal       Left Ear: External ear normal       Nose: Nose normal    Eyes:      General:         Right eye: No discharge  Left eye: No discharge  Conjunctiva/sclera: Conjunctivae normal    Pulmonary:      Effort: Pulmonary effort is normal    Musculoskeletal:         General: Normal range of motion  Cervical back: Normal range of motion  Skin:     General: Skin is warm and dry  Findings: No rash  Neurological:      Mental Status: He is alert and oriented to person, place, and time  Cranial Nerves: No cranial nerve deficit  Psychiatric:         Behavior: Behavior normal          Thought Content:  Thought content normal          Judgment: Judgment normal           I spent 15 minutes directly with the patient during this visit

## 2022-12-06 ENCOUNTER — OFFICE VISIT (OUTPATIENT)
Dept: FAMILY MEDICINE CLINIC | Facility: CLINIC | Age: 67
End: 2022-12-06

## 2022-12-06 VITALS
BODY MASS INDEX: 40.38 KG/M2 | SYSTOLIC BLOOD PRESSURE: 138 MMHG | OXYGEN SATURATION: 95 % | DIASTOLIC BLOOD PRESSURE: 84 MMHG | WEIGHT: 288.4 LBS | HEART RATE: 80 BPM | HEIGHT: 71 IN | RESPIRATION RATE: 18 BRPM | TEMPERATURE: 97.9 F

## 2022-12-06 DIAGNOSIS — Z79.899 HIGH RISK MEDICATION USE: ICD-10-CM

## 2022-12-06 DIAGNOSIS — Z12.5 SCREENING FOR PROSTATE CANCER: ICD-10-CM

## 2022-12-06 DIAGNOSIS — Z13.6 SCREENING FOR CARDIOVASCULAR CONDITION: ICD-10-CM

## 2022-12-06 DIAGNOSIS — Z00.00 WELCOME TO MEDICARE PREVENTIVE VISIT: Primary | ICD-10-CM

## 2022-12-06 DIAGNOSIS — E11.9 TYPE 2 DIABETES MELLITUS WITHOUT COMPLICATION, WITHOUT LONG-TERM CURRENT USE OF INSULIN (HCC): ICD-10-CM

## 2022-12-06 DIAGNOSIS — E78.5 HYPERLIPIDEMIA, UNSPECIFIED HYPERLIPIDEMIA TYPE: ICD-10-CM

## 2022-12-06 NOTE — PROGRESS NOTES
Assessment and Plan:     Problem List Items Addressed This Visit    None  Visit Diagnoses     Welcome to Medicare preventive visit    -  Primary    Type 2 diabetes mellitus without complication, without long-term current use of insulin ([facility] [address] )        Relevant Orders    Comprehensive metabolic panel    Microalbumin / creatinine urine ratio    Hemoglobin A1C    Screening for cardiovascular condition        Screening for prostate cancer        Relevant Orders    UA (URINE) with reflex to Scope    PSA Total (Reflex To Free)    Hyperlipidemia, unspecified hyperlipidemia type        Relevant Orders    Lipid panel    High risk medication use        Relevant Orders    CBC    Comprehensive metabolic panel    UA (URINE) with reflex to Scope        Welcome to medicare ippe completed   Labs ordered   Declined ekg and aaa screening   Discussed diet/exercise and weight loss  F/u in 6 months or sooner if needed        Depression Screening and Follow-up Plan: Patient was screened for depression during today's encounter  They screened negative with a PHQ-2 score of 0  Preventive health issues were discussed with patient, and age appropriate screening tests were ordered as noted in patient's After Visit Summary  Personalized health advice and appropriate referrals for health education or preventive services given if needed, as noted in patient's After Visit Summary  History of Present Illness:     Patient presents for a Medicare Wellness Visit    Here for welcome to medicare [place]  Patient is doing well   No acute complaints  Patient recently retired and started [employer] in October       Patient Care Team:  Tony Ibrahim DO as PCP - General  Hi Sauceda DO as PCP - [address] [place] [address],[address] ([place])     Review of Systems:     Review of Systems   Constitutional: Negative  HENT: Negative  Eyes: Negative  Respiratory: Negative  Cardiovascular: Negative  Gastrointestinal: Negative  Endocrine: Negative      Genitourinary: Negative  Musculoskeletal: Negative  Skin: Negative  Allergic/Immunologic: Negative  Neurological: Negative  Hematological: Negative  Psychiatric/Behavioral: Negative  All other systems reviewed and are negative  Problem List:     Patient Active Problem List   Diagnosis   • Allergic rhinitis   • Fatty liver   • GERD without esophagitis   • HTN (hypertension)   • Obesity (BMI 35 0-39 9 without comorbidity)   • Osteoarthritis of knee   • Sleep apnea   • Leg swelling   • Personal history of colonic polyps   • Internal derangement of knee involving medial meniscus   • CPAP (continuous positive airway pressure) dependence   • Body mass index (BMI) 40 0-44 9, adult [facility])      Past Medical and Surgical History:     Past Medical History:   Diagnosis Date   • Arthritis     b/l knees    today 2/22/2022  L knee scope   • Colon polyp    • CPAP (continuous positive airway pressure) dependence    • Diabetes mellitus (HCC)    • GERD (gastroesophageal reflux disease)    • Hyperlipidemia    • Hypertension    • Internal derangement of knee involving medial meniscus     Resolved 6/5/2015    • Lyme disease     Resolved 11/11/2016    • Obesity 2018   • Sleep apnea      Past Surgical History:   Procedure Laterality Date   • ARTHROSCOPY KNEE Left 2/22/2022    Procedure: ARTHROSCOPY KNEE PARTIAL MEDIAL MENISCECTOMY;  Surgeon: Julio Cesar Gonzalez DO;  Location: [facility];  Service: Orthopedics   • COLONOSCOPY  11/06/2020    All locations appeared normal, no evidence of polyps or masses, small internal hemorrhoids, 5 year recall November 2025   • KNEE ARTHROSCOPY Right 1991   • UPPER GASTROINTESTINAL ENDOSCOPY  11/06/2020    Normal esophagus, normal stomach (large J-shaped stomach but grossly normal), normal duodenum  Biopsies negative for celiac disease, metaplasia, dysplasia, H pylori, positive for reactive gastropathy        Family History:     Family History   Problem Relation Age of Onset   • Cancer Mother • Colon cancer Mother    • Diabetes Father    • Heart disease Father    • Hypertension Father    • Cancer Sister       Social History:     Social History     Socioeconomic History   • Marital status: /Civil Union     Spouse name: None   • Number of children: None   • Years of education: None   • Highest education level: None   Occupational History   • None   Tobacco Use   • Smoking status: Never   • Smokeless tobacco: Never   Vaping Use   • Vaping Use: Never used   Substance and Sexual Activity   • Alcohol use: Not Currently     Comment: 1 x monthly   • Drug use: No   • Sexual activity: Yes     Partners: Female   Other Topics Concern   • None   Social History Narrative    Currently working    Denies alcohol use causing problems    Sedentary lifestyle    Social alcohol use - As per [employer]      Social Determinants of Health     Financial Resource Strain: Low Risk    • Difficulty of Paying Living Expenses: Not very hard   Food Insecurity: Not on file   Transportation Needs: No Transportation Needs   • Lack of Transportation (Medical): No   • Lack of Transportation (Non-Medical):  No   Physical Activity: Inactive   • Days of Exercise per Week: 0 days   • Minutes of Exercise per Session: 0 min   Stress: No Stress Concern Present   • Feeling of Stress : Not at all   Social Connections: Not on file   Intimate Partner Violence: Not At Risk   • Fear of Current or Ex-Partner: No   • Emotionally Abused: No   • Physically Abused: No   • Sexually Abused: No   Housing Stability: Not on file      Medications and Allergies:     Current Outpatient Medications   Medication Sig Dispense Refill   • amLODIPine (NORVASC) 10 mg tablet TAKE 1 TABLET BY MOUTH EVERY DAY 90 tablet 1   • celecoxib (CeleBREX) 200 mg capsule Take 1 capsule (200 mg total) by mouth daily PRN for pain 30 capsule 0   • lisinopril (ZESTRIL) 40 mg tablet TAKE 1 TABLET BY MOUTH EVERY DAY 90 tablet 1   • loratadine (CLARITIN) 10 mg tablet Take 1 tablet by mouth as needed       • Multiple Vitamin (ONE-A-DAY MENS PO) Take by mouth in the morning     • naproxen sodium (ALEVE) 220 MG tablet      • omeprazole (PriLOSEC) 20 mg delayed release capsule Take 20 mg by mouth daily     • aspirin (ECOTRIN) 325 mg EC tablet Take 1 tablet (325 mg total) by mouth 2 (two) times a day Take with food  28 tablet 0     No current facility-administered medications for this visit  Allergies   Allergen Reactions   • Pollen Extract Allergic Rhinitis      Immunizations:     Immunization History   Administered Date(s) Administered   • COVID-19 MODERNA VACC 0 5 ML IM 05/14/2021, 06/11/2021   • Influenza Quadrivalent, 6-35 Months IM 10/02/2015, 11/11/2016   • Influenza, recombinant, quadrivalent,injectable, preservative free 09/18/2020, 12/10/2021   • Tdap 04/27/2011      Health Maintenance:         Topic Date Due   • HIV Screening  Never done   • Colorectal Cancer Screening  11/06/2025   • Hepatitis C Screening  Completed         Topic Date Due   • Hepatitis B Vaccine (1 of 3 - 3-dose series) Never done   • Pneumococcal Vaccine: 65+ Years (1 - PCV) Never done   • COVID-19 Vaccine (3 - Booster for [place] [employer] series) 11/11/2021   • Influenza Vaccine (1) 09/01/2022      Medicare Screening Tests and Risk Assessments:     Sienna Cramer is here for his Welcome to Medicare visit  Health Risk Assessment:   Patient rates overall health as good  Patient feels that their physical health rating is same  Patient is satisfied with their life  Eyesight was rated as same  Hearing was rated as same  Patient feels that their emotional and mental health rating is same  Patients states they are never, rarely angry  Patient states they are never, rarely unusually tired/fatigued  Pain experienced in the last 7 days has been none  Patient states that he has experienced weight loss or gain in last 6 months  Depression Screening:   PHQ-2 Score: 0      Fall Risk Screening:    In the past year, patient has experienced: no history of falling in past year      Home Safety:  Patient does not have trouble with stairs inside or outside of their home  Patient has working smoke alarms and has working carbon monoxide detector  Home safety hazards include: none  Nutrition:   Current diet is Regular  Medications:   Patient is currently taking over-the-counter supplements  OTC medications include: see medication list  Patient is able to manage medications  Activities of Daily Living (ADLs)/Instrumental Activities of Daily Living (IADLs):   Walk and transfer into and out of bed and chair?: Yes  Dress and groom yourself?: Yes    Bathe or shower yourself?: Yes    Feed yourself?  Yes  Do your laundry/housekeeping?: Yes  Manage your money, pay your bills and track your expenses?: Yes  Make your own meals?: Yes    Do your own shopping?: Yes    Previous Hospitalizations:   Any hospitalizations or ED visits within the last 12 months?: Yes    How many hospitalizations have you had in the last year?: 1-2    Hospitalization Comments: Knee surgery    Advance Care Planning:   Living will: No    Durable POA for healthcare: No    Advanced directive: No    Advanced directive counseling given: Yes    Five wishes given: No    End of Life Decisions reviewed with patient: Yes    Provider agrees with end of life decisions: Yes      Cognitive Screening:   Provider or family/friend/caregiver concerned regarding cognition?: No    PREVENTIVE SCREENINGS      Cardiovascular Screening:    General: History Lipid Disorder and Screening Current      Diabetes Screening:     General: History Diabetes and Screening Current      Colorectal Cancer Screening:     General: Screening Current      Prostate Cancer Screening:    General: Screening Current      Osteoporosis Screening:    General: Screening Not Indicated      Abdominal Aortic Aneurysm (AAA) Screening:    Risk factors include: age between 73-69 yo        General: Patient Declines      Lung Cancer Screening: General: Screening Not Indicated      Hepatitis C Screening:    General: Screening Current    Screening, Brief Intervention, and Referral to Treatment (SBIRT)    Screening    Typical number of drinks in a week: 0    Single Item Drug Screening:  How often have you used an illegal drug (including marijuana) or a prescription medication for non-medical reasons in the past year? never    Single Item Drug Screen Score: 0  Interpretation: Negative screen for possible drug use disorder    Other Counseling Topics:   Car/seat belt/driving safety, skin self-exam, sunscreen and calcium and vitamin D intake and regular weightbearing exercise  No results found  Physical Exam:     /84 (BP Location: Left arm, Patient Position: Sitting, Cuff Size: Large)   Pulse 80   Temp 97 9 °F (36 6 °C) (Tympanic)   Resp 18   Ht 5' 10 5" (1 791 m)   Wt 131 kg (288 lb 6 4 oz)   SpO2 95%   BMI 40 80 kg/m²     Physical Exam  Constitutional:       Appearance: He is well-developed and well-nourished  HENT:      Head: Normocephalic  Right Ear: External ear normal       Left Ear: External ear normal       Nose: Nose normal       Mouth/Throat:      Mouth: Oropharynx is clear and moist    Eyes:      Extraocular Movements: EOM normal       Conjunctiva/sclera: Conjunctivae normal       Pupils: Pupils are equal, round, and reactive to light  Cardiovascular:      Rate and Rhythm: Normal rate and regular rhythm  Pulses: Intact distal pulses  Heart sounds: Normal heart sounds  Pulmonary:      Effort: Pulmonary effort is normal       Breath sounds: Normal breath sounds  Abdominal:      General: Bowel sounds are normal       Palpations: Abdomen is soft  Musculoskeletal:         General: Normal range of motion  Cervical back: Normal range of motion and neck supple  Skin:     General: Skin is warm and dry  Neurological:      Mental Status: He is alert and oriented to person, place, and time     Psychiatric: Mood and Affect: Mood and affect normal          Behavior: Behavior normal          Thought Content:  Thought content normal          Judgment: Judgment normal               Hi Page, DO

## 2022-12-11 DIAGNOSIS — I10 ESSENTIAL HYPERTENSION: ICD-10-CM

## 2022-12-11 RX ORDER — AMLODIPINE BESYLATE 10 MG/1
TABLET ORAL
Qty: 90 TABLET | Refills: 1 | Status: SHIPPED | OUTPATIENT
Start: 2022-12-11

## 2022-12-11 RX ORDER — LISINOPRIL 40 MG/1
TABLET ORAL
Qty: 90 TABLET | Refills: 1 | Status: SHIPPED | OUTPATIENT
Start: 2022-12-11

## 2022-12-29 DIAGNOSIS — E11.9 TYPE 2 DIABETES MELLITUS WITHOUT COMPLICATION, WITHOUT LONG-TERM CURRENT USE OF INSULIN (HCC): Primary | ICD-10-CM

## 2022-12-29 LAB
ALBUMIN SERPL-MCNC: 4.3 G/DL (ref 3.8–4.8)
ALBUMIN/CREAT UR: 3 MG/G CREAT (ref 0–29)
ALBUMIN/GLOB SERPL: 1.7 {RATIO} (ref 1.2–2.2)
ALP SERPL-CCNC: 63 IU/L (ref 44–121)
ALT SERPL-CCNC: 56 IU/L (ref 0–44)
APPEARANCE UR: CLEAR
AST SERPL-CCNC: 40 IU/L (ref 0–40)
BILIRUB SERPL-MCNC: 0.5 MG/DL (ref 0–1.2)
BILIRUB UR QL STRIP: NEGATIVE
BUN SERPL-MCNC: 14 MG/DL (ref 8–27)
BUN/CREAT SERPL: 14 (ref 10–24)
CALCIUM SERPL-MCNC: 9.5 MG/DL (ref 8.6–10.2)
CHLORIDE SERPL-SCNC: 102 MMOL/L (ref 96–106)
CHOLEST SERPL-MCNC: 186 MG/DL (ref 100–199)
CHOLEST/HDLC SERPL: 4.9 RATIO (ref 0–5)
CO2 SERPL-SCNC: 28 MMOL/L (ref 20–29)
COLOR UR: YELLOW
CREAT SERPL-MCNC: 1 MG/DL (ref 0.76–1.27)
CREAT UR-MCNC: 135.7 MG/DL
EGFR: 84 ML/MIN/1.73
ERYTHROCYTE [DISTWIDTH] IN BLOOD BY AUTOMATED COUNT: 11.9 % (ref 11.6–15.4)
EST. AVERAGE GLUCOSE BLD GHB EST-MCNC: 157 MG/DL
GLOBULIN SER-MCNC: 2.5 G/DL (ref 1.5–4.5)
GLUCOSE SERPL-MCNC: 146 MG/DL (ref 70–99)
GLUCOSE UR QL: NEGATIVE
HBA1C MFR BLD: 7.1 % (ref 4.8–5.6)
HCT VFR BLD AUTO: 43 % (ref 37.5–51)
HDLC SERPL-MCNC: 38 MG/DL
HGB BLD-MCNC: 15 G/DL (ref 13–17.7)
HGB UR QL STRIP: NEGATIVE
KETONES UR QL STRIP: NEGATIVE
LDLC SERPL CALC-MCNC: 124 MG/DL (ref 0–99)
LEUKOCYTE ESTERASE UR QL STRIP: NEGATIVE
MCH RBC QN AUTO: 33.2 PG (ref 26.6–33)
MCHC RBC AUTO-ENTMCNC: 34.9 G/DL (ref 31.5–35.7)
MCV RBC AUTO: 95 FL (ref 79–97)
MICRO URNS: NORMAL
MICROALBUMIN UR-MCNC: 4.3 UG/ML
NITRITE UR QL STRIP: NEGATIVE
PH UR STRIP: 5 [PH] (ref 5–7.5)
PLATELET # BLD AUTO: 192 X10E3/UL (ref 150–450)
POTASSIUM SERPL-SCNC: 5.1 MMOL/L (ref 3.5–5.2)
PROT SERPL-MCNC: 6.8 G/DL (ref 6–8.5)
PROT UR QL STRIP: NEGATIVE
PSA SERPL-MCNC: 0.5 NG/ML (ref 0–4)
RBC # BLD AUTO: 4.52 X10E6/UL (ref 4.14–5.8)
SL AMB REFLEX CRITERIA: NORMAL
SL AMB VLDL CHOLESTEROL CALC: 24 MG/DL (ref 5–40)
SODIUM SERPL-SCNC: 142 MMOL/L (ref 134–144)
SP GR UR: 1.02 (ref 1–1.03)
TRIGL SERPL-MCNC: 134 MG/DL (ref 0–149)
UROBILINOGEN UR STRIP-ACNC: 0.2 MG/DL (ref 0.2–1)
WBC # BLD AUTO: 5.3 X10E3/UL (ref 3.4–10.8)

## 2023-03-13 ENCOUNTER — OFFICE VISIT (OUTPATIENT)
Dept: URGENT CARE | Facility: CLINIC | Age: 68
End: 2023-03-13

## 2023-03-13 VITALS
BODY MASS INDEX: 41.23 KG/M2 | TEMPERATURE: 100.5 F | SYSTOLIC BLOOD PRESSURE: 134 MMHG | HEIGHT: 70 IN | RESPIRATION RATE: 18 BRPM | OXYGEN SATURATION: 95 % | DIASTOLIC BLOOD PRESSURE: 80 MMHG | HEART RATE: 93 BPM | WEIGHT: 288 LBS

## 2023-03-13 DIAGNOSIS — H66.91 RIGHT OTITIS MEDIA, UNSPECIFIED OTITIS MEDIA TYPE: Primary | ICD-10-CM

## 2023-03-13 DIAGNOSIS — R05.1 ACUTE COUGH: ICD-10-CM

## 2023-03-13 DIAGNOSIS — J01.90 ACUTE NON-RECURRENT SINUSITIS, UNSPECIFIED LOCATION: ICD-10-CM

## 2023-03-13 RX ORDER — AMOXICILLIN 875 MG/1
875 TABLET, COATED ORAL 2 TIMES DAILY
Qty: 20 TABLET | Refills: 0 | Status: SHIPPED | OUTPATIENT
Start: 2023-03-13 | End: 2023-03-23

## 2023-03-13 RX ORDER — BENZONATATE 100 MG/1
100 CAPSULE ORAL 3 TIMES DAILY PRN
Qty: 20 CAPSULE | Refills: 0 | Status: SHIPPED | OUTPATIENT
Start: 2023-03-13

## 2023-03-13 NOTE — PATIENT INSTRUCTIONS
Patient was educated on right ear infection and sinus infection  Patient was prescribed antibiotics  Patient was told to eat on antibiotics  Patient was also prescribed Tessalon Pearls for cough  Patient was told any chest pain or shortness of breath go to ED    Ear Infection   WHAT YOU NEED TO KNOW:   An ear infection is also called otitis media  Blocked or swollen eustachian tubes can cause an infection  Eustachian tubes connect the middle ear to the back of the nose and throat  They drain fluid from the middle ear  You may have a buildup of fluid in your ear  Germs build up in the fluid and infection develops  DISCHARGE INSTRUCTIONS:   Return to the emergency department if:   You have clear fluid coming from your ear  You have a stiff neck, headache, and a fever  Call your doctor if:   You see blood or pus draining from your ear  Your ear pain gets worse or does not go away, even after treatment  The outside of your ear is red or swollen  You are vomiting or have diarrhea  You have questions or concerns about your condition or care  Medicines: You may  need any of the following:  Acetaminophen  decreases pain and fever  It is available without a doctor's order  Ask how much to take and how often to take it  Follow directions  Read the labels of all other medicines you are using to see if they also contain acetaminophen, or ask your doctor or pharmacist  Acetaminophen can cause liver damage if not taken correctly  NSAIDs , such as ibuprofen, help decrease swelling, pain, and fever  This medicine is available with or without a doctor's order  NSAIDs can cause stomach bleeding or kidney problems in certain people  If you take blood thinner medicine, always ask your healthcare provider if NSAIDs are safe for you  Always read the medicine label and follow directions  Ear drops  may contain medicine to decrease pain and inflammation      Antibiotics  help treat a bacterial infection  Take your medicine as directed  Contact your healthcare provider if you think your medicine is not helping or if you have side effects  Tell your provider if you are allergic to any medicine  Keep a list of the medicines, vitamins, and herbs you take  Include the amounts, and when and why you take them  Bring the list or the pill bottles to follow-up visits  Carry your medicine list with you in case of an emergency  Self-care:   Apply heat  on your ear for 15 to 20 minutes, 3 to 4 times a day or as directed  You can apply heat with an electric heating pad, hot water bottle, or warm compress  Always put a cloth between your skin and the heat pack to prevent burns  Heat helps decrease pain  Apply ice  on your ear for 15 to 20 minutes, 3 to 4 times a day for 2 days or as directed  Use an ice pack, or put crushed ice in a plastic bag  Cover it with a towel before you apply it to your ear  Ice decreases swelling and pain  Prevent an ear infection:   Wash your hands often  to help prevent the spread of germs  Ask everyone in your house to wash their hands with soap and water  Ask them to wash after they use the bathroom or change a diaper  Remind them to wash before they prepare or eat food  Stay away from people who are ill  Some germs spread easily and quickly through contact  Follow up with your doctor as directed:  Write down your questions so you remember to ask them during your visits  © Copyright [employer] 2022 Information is for End User's use only and may not be sold, redistributed or otherwise used for commercial purposes  The above information is an  only  It is not intended as medical advice for individual conditions or treatments  Talk to your doctor, nurse or pharmacist before following any medical regimen to see if it is safe and effective for you  Sinusitis   WHAT YOU NEED TO KNOW:   Sinusitis is inflammation or infection of your sinuses   Sinusitis is most often caused by a virus  Acute sinusitis may last up to 12 weeks  Chronic sinusitis lasts longer than 12 weeks  Recurrent sinusitis means you have 4 or more infections in 1 year  DISCHARGE INSTRUCTIONS:   Return to the emergency department if:   You have trouble breathing or wheezing that is getting worse  You have a stiff neck, a fever, or a bad headache  You cannot open your eye  Your eyeball bulges out or you cannot move your eye  You are more sleepy than normal, or you notice changes in your ability to think, move, or talk  You have swelling of your forehead or scalp  Call your doctor if:   You have vision changes, such as double vision  Your eye and eyelid are red, swollen, and painful  Your symptoms do not improve or go away after 10 days  You have nausea and are vomiting  Your nose is bleeding  You have questions or concerns about your condition or care  Medicines: Your symptoms may go away on their own  Your healthcare provider may recommend watchful waiting for up to 10 days before starting antibiotics  You may need any of the following:  Acetaminophen  decreases pain and fever  It is available without a doctor's order  Ask how much to take and how often to take it  Follow directions  Read the labels of all other medicines you are using to see if they also contain acetaminophen, or ask your doctor or pharmacist  Acetaminophen can cause liver damage if not taken correctly  NSAIDs , such as ibuprofen, help decrease swelling, pain, and fever  This medicine is available with or without a doctor's order  NSAIDs can cause stomach bleeding or kidney problems in certain people  If you take blood thinner medicine, always ask your healthcare provider if NSAIDs are safe for you  Always read the medicine label and follow directions  Nasal steroid sprays  may help decrease inflammation in your nose and sinuses      Decongestants  help reduce swelling and drain mucus in the nose and sinuses  They may help you breathe easier  Antihistamines  help dry mucus in the nose and relieve sneezing  Antibiotics  help treat or prevent a bacterial infection  Take your medicine as directed  Contact your healthcare provider if you think your medicine is not helping or if you have side effects  Tell your provider if you are allergic to any medicine  Keep a list of the medicines, vitamins, and herbs you take  Include the amounts, and when and why you take them  Bring the list or the pill bottles to follow-up visits  Carry your medicine list with you in case of an emergency  Self-care:   Rinse your sinuses as directed  Use a sinus rinse device to rinse your nasal passages with a saline (salt water) solution or distilled water  Do not use tap water  This will help thin the mucus in your nose and rinse away pollen and dirt  It will also help reduce swelling so you can breathe normally  Use a humidifier  to increase air moisture in your home  This may make it easier for you to breathe and help decrease your cough  Sleep with your head elevated  Place an extra pillow under your head before you go to sleep to help your sinuses drain  Drink liquids as directed  Ask your healthcare provider how much liquid to drink each day and which liquids are best for you  Liquids will thin the mucus in your nose and help it drain  Avoid drinks that contain alcohol or caffeine  Do not smoke, and avoid secondhand smoke  Nicotine and other chemicals in cigarettes and cigars can make your symptoms worse  Ask your healthcare provider for information if you currently smoke and need help to quit  E-cigarettes or smokeless tobacco still contain nicotine  Talk to your healthcare provider before you use these products  Prevent the spread of germs:   Wash your hands often with soap and water  Wash your hands after you use the bathroom, change a child's diaper, or sneeze   Wash your hands before you prepare or eat food  Stay away from people who are sick  Some germs spread easily and quickly through contact  Follow up with your doctor as directed: You may be referred to an ear, nose, and throat specialist  Write down your questions so you remember to ask them during your visits  © Copyright [employer] 2022 Information is for End User's use only and may not be sold, redistributed or otherwise used for commercial purposes  The above information is an  only  It is not intended as medical advice for individual conditions or treatments  Talk to your doctor, nurse or pharmacist before following any medical regimen to see if it is safe and effective for you

## 2023-03-13 NOTE — PROGRESS NOTES
[address] [facility]        NAME: Johnson Miller is a 79 y o  male  : 1955    MRN: [id number]  DATE: 2023  TIME: 2:19 PM    Assessment and Plan   Right otitis media, unspecified otitis media type [H66 91]  1  Right otitis media, unspecified otitis media type  amoxicillin (AMOXIL) 875 mg tablet      2  Acute non-recurrent sinusitis, unspecified location  amoxicillin (AMOXIL) 875 mg tablet      3  Acute cough  benzonatate (TESSALON PERLES) 100 mg capsule        Declined COVID and flu testing    Patient Instructions     Patient was educated on right ear infection and sinus infection  Patient was prescribed antibiotics  Patient was told to eat on antibiotics  Patient was also prescribed Tessalon Pearls for cough  Patient was told any chest pain or shortness of breath go to ED  Chief Complaint     Chief Complaint   Patient presents with   • Cold Like Symptoms     Pt reports productive cough and fever with onset of symptoms Thursday  States symptoms progressed on Saturday with right ear pressure and sinus pressure  Negative at home Covid test on   Managing with otc medication with some relief while using NyQuil  History of Present Illness       Patient is here today complaining of fever, congestion, ear pain, sinus pressure, sinus pain and wet cough since 3/8/23  Patient reports he took an at [address] test and this was negative  Denies any current allergies to medications  Admits asthma in his youth  Denies any current history of diabetes  Patient reports the cough keeps him from sleeping  Review of Systems   Review of Systems   Constitutional: Positive for fever  HENT: Positive for congestion, ear pain, sinus pressure and sinus pain  Respiratory: Positive for cough  Cardiovascular: Negative  Neurological: Negative  Psychiatric/Behavioral: Negative            Current Medications       Current Outpatient Medications:   •  amLODIPine (NORVASC) 10 mg tablet, TAKE 1 TABLET BY MOUTH EVERY DAY, Disp: 90 tablet, Rfl: 1  •  amoxicillin (AMOXIL) 875 mg tablet, Take 1 tablet (875 mg total) by mouth 2 (two) times a day for 10 days, Disp: 20 tablet, Rfl: 0  •  aspirin (ECOTRIN) 325 mg EC tablet, Take 1 tablet (325 mg total) by mouth 2 (two) times a day Take with food  , Disp: 28 tablet, Rfl: 0  •  benzonatate (TESSALON PERLES) 100 mg capsule, Take 1 capsule (100 mg total) by mouth 3 (three) times a day as needed for cough, Disp: 20 capsule, Rfl: 0  •  celecoxib (CeleBREX) 200 mg capsule, Take 1 capsule (200 mg total) by mouth daily PRN for pain, Disp: 30 capsule, Rfl: 0  •  lisinopril (ZESTRIL) 40 mg tablet, TAKE 1 TABLET BY MOUTH EVERY DAY, Disp: 90 tablet, Rfl: 1  •  Multiple Vitamin (ONE-A-DAY MENS PO), Take by mouth in the morning, Disp: , Rfl:   •  naproxen sodium (ALEVE) 220 MG tablet, , Disp: , Rfl:   •  omeprazole (PriLOSEC) 20 mg delayed release capsule, Take 20 mg by mouth daily, Disp: , Rfl:   •  loratadine (CLARITIN) 10 mg tablet, Take 1 tablet by mouth as needed   (Patient not taking: Reported on 3/13/2023), Disp: , Rfl:     Current Allergies     Allergies as of 03/13/2023 - Reviewed 03/13/2023   Allergen Reaction Noted   • Pollen extract Allergic Rhinitis 09/20/2022            The following portions of the patient's history were reviewed and updated as appropriate: allergies, current medications, past family history, past medical history, past social history, past surgical history and problem list      Past Medical History:   Diagnosis Date   • Arthritis     b/l knees    today 2/22/2022  L knee scope   • Colon polyp    • CPAP (continuous positive airway pressure) dependence    • Diabetes mellitus (HCC)    • GERD (gastroesophageal reflux disease)    • Hyperlipidemia    • Hypertension    • Internal derangement of knee involving medial meniscus     Resolved 6/5/2015    • Lyme disease     Resolved 11/11/2016    • Obesity 2018   • Sleep apnea        Past Surgical History: Procedure Laterality Date   • ARTHROSCOPY KNEE Left 2/22/2022    Procedure: ARTHROSCOPY KNEE PARTIAL MEDIAL MENISCECTOMY;  Surgeon: Yana Street DO;  Location: [facility];  Service: Orthopedics   • COLONOSCOPY  11/06/2020    All locations appeared normal, no evidence of polyps or masses, small internal hemorrhoids, 5 year recall November 2025   • KNEE ARTHROSCOPY Right 1991   • UPPER GASTROINTESTINAL ENDOSCOPY  11/06/2020    Normal esophagus, normal stomach (large J-shaped stomach but grossly normal), normal duodenum  Biopsies negative for celiac disease, metaplasia, dysplasia, H pylori, positive for reactive gastropathy  Family History   Problem Relation Age of Onset   • Cancer Mother    • Colon cancer Mother    • Diabetes Father    • Heart disease Father    • Hypertension Father    • Cancer Sister          Medications have been verified  Objective   /80 (BP Location: Left arm, Patient Position: Sitting)   Pulse 93   Temp 100 5 °F (38 1 °C)   Resp 18   Ht 5' 10" (1 778 m)   Wt 131 kg (288 lb)   SpO2 95%   BMI 41 32 kg/m²   No LMP for male patient  Physical Exam     Physical Exam  Vitals and nursing note reviewed  Constitutional:       Appearance: Normal appearance  HENT:      Head: Normocephalic  Comments: Pressure over frontal and maxillary sinus     Ears:      Comments: RIGHT TM is red and bulging     Nose: Nose normal       Mouth/Throat:      Mouth: Mucous membranes are moist       Pharynx: No oropharyngeal exudate or posterior oropharyngeal erythema  Eyes:      Extraocular Movements: Extraocular movements intact  Pupils: Pupils are equal, round, and reactive to light  Cardiovascular:      Rate and Rhythm: Normal rate and regular rhythm  Heart sounds: Normal heart sounds  Pulmonary:      Breath sounds: Normal breath sounds  No wheezing  Neurological:      General: No focal deficit present        Mental Status: He is alert and oriented to person, place, and time     Psychiatric:         Mood and Affect: Mood normal          Behavior: Behavior normal

## 2023-03-20 LAB
LEFT EYE DIABETIC RETINOPATHY: NORMAL
RIGHT EYE DIABETIC RETINOPATHY: NORMAL

## 2023-03-24 ENCOUNTER — TELEPHONE (OUTPATIENT)
Dept: FAMILY MEDICINE CLINIC | Facility: CLINIC | Age: 68
End: 2023-03-24

## 2023-03-24 NOTE — TELEPHONE ENCOUNTER
Patient is calling stating that he went to urgent care 03/13/2023 and he was prescribed amoxicillin and he finished taking them and he still seems to have a blockage in his ear  He is unsure on what to do next

## 2023-03-27 ENCOUNTER — RA CDI HCC (OUTPATIENT)
Dept: OTHER | Facility: HOSPITAL | Age: 68
End: 2023-03-27

## 2023-03-27 NOTE — PROGRESS NOTES
[facility]  coding opportunities       Chart reviewed, no opportunity found:   [address]        Patients Insurance     Medicare Insurance: [employer]

## 2023-03-30 ENCOUNTER — OFFICE VISIT (OUTPATIENT)
Dept: FAMILY MEDICINE CLINIC | Facility: CLINIC | Age: 68
End: 2023-03-30

## 2023-03-30 VITALS
WEIGHT: 288.8 LBS | HEIGHT: 70 IN | SYSTOLIC BLOOD PRESSURE: 120 MMHG | TEMPERATURE: 97.5 F | RESPIRATION RATE: 16 BRPM | HEART RATE: 82 BPM | OXYGEN SATURATION: 96 % | BODY MASS INDEX: 41.35 KG/M2 | DIASTOLIC BLOOD PRESSURE: 64 MMHG

## 2023-03-30 DIAGNOSIS — H69.81 EUSTACHIAN TUBE DYSFUNCTION, RIGHT: Primary | ICD-10-CM

## 2023-03-30 RX ORDER — PREDNISONE 10 MG/1
TABLET ORAL
Qty: 21 TABLET | Refills: 1 | Status: SHIPPED | OUTPATIENT
Start: 2023-03-30

## 2023-03-30 NOTE — PROGRESS NOTES
"    Assessment/Plan:       Diagnoses and all orders for this visit:    Eustachian tube dysfunction, right  -     predniSONE 10 mg tablet; 6 tabs on Day 1,5 tabs on Day 2,4 tabs on Day 3,3 tabs on Day 4,2 tabs on  Day 5And 1 tab on Day 6    Body mass index (BMI) 40 0-44 9, adult ([address] )  Discussed diet exercise and weight loss        Prednisone as ordered  Follow-up as needed      Subjective:     Chief Complaint   Patient presents with   • Ear Fullness        Patient ID: Louiselord Paiz is a 79 y o  male  Patient presents today for hearing loss in his right ear  He says he is unable to hear anything in his ear feels full  He has been congested in the nose as well        The following portions of the patient's history were reviewed and updated as appropriate: allergies, current medications, past family history, past medical history, past social history, past surgical history and problem list     Review of Systems   Constitutional: Negative  HENT: Negative  Hearing loss in right ear along with fullness   Eyes: Negative  Respiratory: Negative  Cardiovascular: Negative  Gastrointestinal: Negative  Endocrine: Negative  Genitourinary: Negative  Musculoskeletal: Negative  Skin: Negative  Allergic/Immunologic: Negative  Neurological: Negative  Hematological: Negative  Psychiatric/Behavioral: Negative  All other systems reviewed and are negative  Objective:    Vitals:    03/30/23 1442   BP: 120/64   BP Location: Left arm   Patient Position: Sitting   Cuff Size: Large   Pulse: 82   Resp: 16   Temp: 97 5 °F (36 4 °C)   SpO2: 96%   Weight: 131 kg (288 lb 12 8 oz)   Height: 5' 10\" (1 778 m)          Physical Exam  Vitals and nursing note reviewed  Constitutional:       General: He is not in acute distress  Appearance: He is well-developed  HENT:      Head: Normocephalic        Right Ear: External ear normal       Left Ear: External ear normal       Ears:      " Comments: Bulging tympanic membranes bilaterally     Nose: Nose normal    Eyes:      General:         Right eye: No discharge  Left eye: No discharge  Conjunctiva/sclera: Conjunctivae normal       Pupils: Pupils are equal, round, and reactive to light  Cardiovascular:      Rate and Rhythm: Normal rate and regular rhythm  Heart sounds: Normal heart sounds  Pulmonary:      Effort: Pulmonary effort is normal       Breath sounds: Normal breath sounds  Abdominal:      General: Bowel sounds are normal  There is no distension  Palpations: Abdomen is soft  Tenderness: There is no abdominal tenderness  Musculoskeletal:         General: Normal range of motion  Cervical back: Normal range of motion  Skin:     General: Skin is warm and dry  Findings: No rash  Neurological:      Mental Status: He is alert and oriented to person, place, and time  Cranial Nerves: No cranial nerve deficit  Psychiatric:         Behavior: Behavior normal          Thought Content:  Thought content normal          Judgment: Judgment normal

## 2023-04-11 DIAGNOSIS — H69.81 EUSTACHIAN TUBE DYSFUNCTION, RIGHT: Primary | ICD-10-CM

## 2023-06-07 DIAGNOSIS — I10 ESSENTIAL HYPERTENSION: ICD-10-CM

## 2023-06-07 RX ORDER — LISINOPRIL 40 MG/1
TABLET ORAL
Qty: 90 TABLET | Refills: 1 | Status: SHIPPED | OUTPATIENT
Start: 2023-06-07

## 2023-06-07 RX ORDER — AMLODIPINE BESYLATE 10 MG/1
TABLET ORAL
Qty: 90 TABLET | Refills: 1 | Status: SHIPPED | OUTPATIENT
Start: 2023-06-07

## 2023-09-13 ENCOUNTER — APPOINTMENT (OUTPATIENT)
Dept: RADIOLOGY | Facility: MEDICAL CENTER | Age: 68
End: 2023-09-13
Payer: COMMERCIAL

## 2023-09-13 ENCOUNTER — OFFICE VISIT (OUTPATIENT)
Dept: OBGYN CLINIC | Facility: MEDICAL CENTER | Age: 68
End: 2023-09-13
Payer: COMMERCIAL

## 2023-09-13 VITALS
BODY MASS INDEX: 41.12 KG/M2 | WEIGHT: 287.2 LBS | SYSTOLIC BLOOD PRESSURE: 137 MMHG | HEART RATE: 74 BPM | DIASTOLIC BLOOD PRESSURE: 82 MMHG | HEIGHT: 70 IN

## 2023-09-13 DIAGNOSIS — Z01.89 ENCOUNTER FOR LOWER EXTREMITY COMPARISON IMAGING STUDY: ICD-10-CM

## 2023-09-13 DIAGNOSIS — M25.561 ACUTE PAIN OF RIGHT KNEE: Primary | ICD-10-CM

## 2023-09-13 DIAGNOSIS — M25.561 RIGHT KNEE PAIN, UNSPECIFIED CHRONICITY: ICD-10-CM

## 2023-09-13 DIAGNOSIS — M25.561 RIGHT KNEE PAIN, UNSPECIFIED CHRONICITY: Primary | ICD-10-CM

## 2023-09-13 DIAGNOSIS — M17.11 PRIMARY OSTEOARTHRITIS OF RIGHT KNEE: ICD-10-CM

## 2023-09-13 PROCEDURE — 73564 X-RAY EXAM KNEE 4 OR MORE: CPT

## 2023-09-13 PROCEDURE — 20610 DRAIN/INJ JOINT/BURSA W/O US: CPT | Performed by: ORTHOPAEDIC SURGERY

## 2023-09-13 PROCEDURE — 99214 OFFICE O/P EST MOD 30 MIN: CPT | Performed by: ORTHOPAEDIC SURGERY

## 2023-09-13 PROCEDURE — 73560 X-RAY EXAM OF KNEE 1 OR 2: CPT

## 2023-09-13 RX ORDER — DICLOFENAC SODIUM 75 MG/1
75 TABLET, DELAYED RELEASE ORAL 2 TIMES DAILY PRN
Qty: 60 TABLET | Refills: 2 | Status: SHIPPED | OUTPATIENT
Start: 2023-09-13

## 2023-09-13 RX ADMIN — TRIAMCINOLONE ACETONIDE 40 MG: 40 INJECTION, SUSPENSION INTRA-ARTICULAR; INTRAMUSCULAR at 12:00

## 2023-09-13 RX ADMIN — BUPIVACAINE HYDROCHLORIDE 2 ML: 2.5 INJECTION, SOLUTION INFILTRATION; PERINEURAL at 12:00

## 2023-09-13 NOTE — PROGRESS NOTES
Assessment/Plan     1. Acute pain of right knee    2. Primary osteoarthritis of right knee      Orders Placed This Encounter   Procedures   • Large joint arthrocentesis       · Pt has known Tricompartmental Moderate -Severe Osteoarthritis   · Physical exam performed. Discussed findings consistent with meniscus tear  · Discussed getting new X-rays and having an Injection today. New plain films obtained and  independently reviewed. Pt with noted Moderate-Severe Osteoarthritis Right knee  We can get an future MRI for further diagnotic studies to rule out a meniscus tear if pt continues to have unresolved symptoms  · Pt was offered, accepted, performed and steroid injection Right knee for symptomatic relief. Pt tolerated injections well. Ice and post injection protocol advised. Weigh bearing activities as tolerated. Return in about 3 months (around 12/13/2023) for Recheck. I answered all of the patient's questions during the visit and provided education of the patient's condition during the visit. The patient verbalized understanding of the information given and agrees with the plan. This note was dictated using [employer] software. It may contain errors including improperly dictated words. Please contact physician directly for any questions. History of Present Illness   Chief complaint: No chief complaint on file. HPI: Jaime Suresh is a 79 y.o. male that c/o right knee pain. Pt report last week on 9/6/2023,  he was walking down an embankment when he must have take an misstep. He states he had mild knee pain and difficulty bending his knee but was able to finish his day at work. He was walking around a house taking pictures of a house. Sandor Saenz He denies any twisting motion. He used heat and ice as well as Aleve for pain relief. He then played 4 days of golf with a brace on and by Monday 9/11/2023, he could barely walk.  He states today he had lateral knee pain with WB but when seated, his pain is posterior with knee flexion. Posterior pain is noted when he goes from sit to stand. No pain at rest  No improvement since Monday. ROS:    See HPI for musculoskeletal review. All other systems reviewed are negative     Historical Information   Past Medical History:   Diagnosis Date   • Arthritis     b/l knees    today 2/22/2022  L knee scope   • Colon polyp    • CPAP (continuous positive airway pressure) dependence    • Diabetes mellitus (HCC)    • GERD (gastroesophageal reflux disease)    • Hyperlipidemia    • Hypertension    • Internal derangement of knee involving medial meniscus     Resolved 6/5/2015    • Lyme disease     Resolved 11/11/2016    • Obesity 2018   • Sleep apnea      Past Surgical History:   Procedure Laterality Date   • ARTHROSCOPY KNEE Left 2/22/2022    Procedure: ARTHROSCOPY KNEE PARTIAL MEDIAL MENISCECTOMY;  Surgeon: Maritza Giron DO;  Location: [facility];  Service: Orthopedics   • COLONOSCOPY  11/06/2020    All locations appeared normal, no evidence of polyps or masses, small internal hemorrhoids, 5 year recall November 2025   • KNEE ARTHROSCOPY Right 1991   • UPPER GASTROINTESTINAL ENDOSCOPY  11/06/2020    Normal esophagus, normal stomach (large J-shaped stomach but grossly normal), normal duodenum. Biopsies negative for celiac disease, metaplasia, dysplasia, H pylori, positive for reactive gastropathy.      Social History   Social History     Substance and Sexual Activity   Alcohol Use Not Currently    Comment: 1 x monthly     Social History     Substance and Sexual Activity   Drug Use No     Social History     Tobacco Use   Smoking Status Never   • Passive exposure: Never   Smokeless Tobacco Never     Family History:   Family History   Problem Relation Age of Onset   • Cancer Mother    • Colon cancer Mother    • Diabetes Father    • Heart disease Father    • Hypertension Father    • Cancer Sister        Current Outpatient Medications on File Prior to Visit   Medication Sig Dispense Refill   • amLODIPine (NORVASC) 10 mg tablet TAKE 1 TABLET BY MOUTH EVERY DAY 90 tablet 1   • celecoxib (CeleBREX) 200 mg capsule Take 1 capsule (200 mg total) by mouth daily PRN for pain 30 capsule 0   • fluticasone (FLONASE) 50 mcg/act nasal spray SPRAY 1 SPRAY INTO EACH NOSTRIL EVERY DAY 24 mL 3   • lisinopril (ZESTRIL) 40 mg tablet TAKE 1 TABLET BY MOUTH EVERY DAY 90 tablet 1   • loratadine (CLARITIN) 10 mg tablet Take 1 tablet by mouth as needed     • Multiple Vitamin (ONE-A-DAY MENS PO) Take by mouth in the morning     • naproxen sodium (ALEVE) 220 MG tablet      • omeprazole (PriLOSEC) 20 mg delayed release capsule Take 20 mg by mouth daily     • predniSONE 10 mg tablet 6 tabs on Day 1,5 tabs on Day 2,4 tabs on Day 3,3 tabs on Day 4,2 tabs on  Day 5And 1 tab on Day 6 21 tablet 1     No current facility-administered medications on file prior to visit. Allergies   Allergen Reactions   • Pollen Extract Allergic Rhinitis       Current Outpatient Medications on File Prior to Visit   Medication Sig Dispense Refill   • amLODIPine (NORVASC) 10 mg tablet TAKE 1 TABLET BY MOUTH EVERY DAY 90 tablet 1   • celecoxib (CeleBREX) 200 mg capsule Take 1 capsule (200 mg total) by mouth daily PRN for pain 30 capsule 0   • fluticasone (FLONASE) 50 mcg/act nasal spray SPRAY 1 SPRAY INTO EACH NOSTRIL EVERY DAY 24 mL 3   • lisinopril (ZESTRIL) 40 mg tablet TAKE 1 TABLET BY MOUTH EVERY DAY 90 tablet 1   • loratadine (CLARITIN) 10 mg tablet Take 1 tablet by mouth as needed     • Multiple Vitamin (ONE-A-DAY MENS PO) Take by mouth in the morning     • naproxen sodium (ALEVE) 220 MG tablet      • omeprazole (PriLOSEC) 20 mg delayed release capsule Take 20 mg by mouth daily     • predniSONE 10 mg tablet 6 tabs on Day 1,5 tabs on Day 2,4 tabs on Day 3,3 tabs on Day 4,2 tabs on  Day 5And 1 tab on Day 6 21 tablet 1     No current facility-administered medications on file prior to visit.        Objective   Vitals: Blood pressure 137/82, pulse 74, height 5' 10" (1.778 m), weight 130 kg (287 lb 3.2 oz). ,Body mass index is 41.21 kg/m². PE:  AAOx 3  WDWN  Hearing intact, no drainage from eyes  Regular rate  no audible wheezing  no abdominal distension  LE compartments soft, skin intact    rightknee:    Appearance:  no swelling   No ecchymosis  no obvious joint deformity   Mild effusion  Palpation/Tenderness:  NoTTP over medial joint line  No TTP over lateral joint line   No TTP over patella  No TTP over patellar tendon  No TTP over pes anserine bursa  Active Range of Motion:  AROM:  painful  Special Tests:  Medial Ariana's Test:  Positive  Lateral Ariana's Test:  Positive  Apley's compression test:  Negative  Lachman's Test:  negative  Anterior Drawer Test:  Negative  Patellar grind:  Negative  Valgus Stress Test:  negative  Varus Stress Test:  negative     No ipsilateral hip pain with ROM    rightLE:    Sensation grossly intact  Palpable  pulse  AT/GS/EHL intact    Imaging Studies: I have personally reviewed pertinent films in PACS  XR rightknee: Tricompartmental Moderate -Severe Osteoarthritis     Large joint arthrocentesis: R knee  Universal Protocol:  Consent: Verbal consent obtained.   Risks and benefits: risks, benefits and alternatives were discussed  Consent given by: patient  Timeout called at: 9/13/2023 1:35 PM.  Patient understanding: patient states understanding of the procedure being performed  Site marked: the operative site was marked  Patient identity confirmed: verbally with patient    Supporting Documentation  Indications: pain   Procedure Details  Location: knee - R knee  Needle size: 22 G  Ultrasound guidance: no  Approach: anterolateral  Medications administered: 2 mL bupivacaine 0.25 %; 40 mg triamcinolone acetonide 40 mg/mL    Patient tolerance: patient tolerated the procedure well with no immediate complications  Dressing:  Sterile dressing applied

## 2023-09-14 RX ORDER — TRIAMCINOLONE ACETONIDE 40 MG/ML
40 INJECTION, SUSPENSION INTRA-ARTICULAR; INTRAMUSCULAR
Status: COMPLETED | OUTPATIENT
Start: 2023-09-13 | End: 2023-09-13

## 2023-09-14 RX ORDER — BUPIVACAINE HYDROCHLORIDE 2.5 MG/ML
2 INJECTION, SOLUTION INFILTRATION; PERINEURAL
Status: COMPLETED | OUTPATIENT
Start: 2023-09-13 | End: 2023-09-13

## 2023-09-28 ENCOUNTER — EVALUATION (OUTPATIENT)
Dept: PHYSICAL THERAPY | Facility: CLINIC | Age: 68
End: 2023-09-28
Payer: COMMERCIAL

## 2023-09-28 DIAGNOSIS — M25.561 ACUTE PAIN OF RIGHT KNEE: Primary | ICD-10-CM

## 2023-09-28 DIAGNOSIS — M17.11 PRIMARY OSTEOARTHRITIS OF RIGHT KNEE: ICD-10-CM

## 2023-09-28 PROCEDURE — 97110 THERAPEUTIC EXERCISES: CPT | Performed by: PHYSICAL THERAPIST

## 2023-09-28 PROCEDURE — 97140 MANUAL THERAPY 1/> REGIONS: CPT | Performed by: PHYSICAL THERAPIST

## 2023-09-28 PROCEDURE — 97161 PT EVAL LOW COMPLEX 20 MIN: CPT | Performed by: PHYSICAL THERAPIST

## 2023-09-28 NOTE — PROGRESS NOTES
PT Evaluation     Today's date: 2023  Patient name: Jag Blunt  : 1955  MRN: [id number]  Referring provider: Ele Martinez  Dx:   Encounter Diagnosis     ICD-10-CM    1. Acute pain of right knee  M25.561 Ambulatory referral to Physical Therapy      2. Primary osteoarthritis of right knee  M17.11 Ambulatory referral to Physical Therapy                     Assessment  Assessment details: Jag Blunt is a 79 y.o. male who presents with pain, decreased strength, decreased ROM and squatting dysfunction. Due to these impairments, patient has difficulty performing a/iadls, recreational activities and engaging in social activities. Patient's clinical presentation is consistent with their referring diagnosis of Acute pain of right knee, Primary osteoarthritis of right knee. Patient has been educated in home exercise program and plan of care. Patient would benefit from skilled physical therapy services to address their aforementioned functional limitations and progress towards prior level of function and independence with home exercise program.   Impairments: abnormal gait, abnormal muscle firing, abnormal or restricted ROM, activity intolerance, impaired balance, impaired physical strength, lacks appropriate home exercise program, pain with function and weight-bearing intolerance    Symptom irritability: moderateUnderstanding of Dx/Px/POC: good   Prognosis: good    Goals  Short Term Goals: Target Date 4 weeks  1. Pt will initiate and advance HEP. 2. Pt will have < 3/10 pain  3. Pt will have full arom of the right knee  4. Pt will be able to sit to stand with out difficulty    Long Term Goals: Target Date 8 weeks  1. Pt will demonstrate independence in HEP. 2. Pt will have <1/10 pain  3. Pt will have full core and B LE strength  4.  Pt will be able to walk 18 holes of golf with out difficulty      Plan  Patient would benefit from: skilled PT  Planned modality interventions: cryotherapy and thermotherapy: hydrocollator packs  Planned therapy interventions: joint mobilization, manual therapy, patient education, postural training, activity modification, abdominal trunk stabilization, body mechanics training, flexibility, functional ROM exercises, graded exercise, home exercise program, neuromuscular re-education, strengthening, stretching, therapeutic activities, therapeutic exercise, motor coordination training, muscle pump exercises, balance/weight bearing training and ADL training  Frequency: 1x week  Duration in weeks: 8  Plan of Care beginning date: 2023  Plan of Care expiration date: 2023  Treatment plan discussed with: patient        Subjective Evaluation    History of Present Illness  Mechanism of injury: Pt notes about  A 2 week hx of right knee pain. It started with walking down a slight hill, but didn't slide, trip or fall, just started with pain. Pain built with the following days. Didn't hurt to swing a golf club, but did hurt to walk. Since seeing orhto and getting injection pt did have improvements in pain. Most pain with transition from sitting to standing or the reverse. Patient Goals  Patient goals for therapy: decreased pain, increased motion, independence with ADLs/IADLs, increased strength and return to sport/leisure activities    Pain  Current pain ratin  At best pain ratin  At worst pain ratin  Location: right knee  Quality: dull ache, discomfort and tight          Objective     Static Posture   General Observations  Asymmetrical weight bearing and shifted left. Palpation     Right   Tenderness of the distal biceps femoris, distal semimembranosus, distal semitendinosus and lateral gastrocnemius. Tenderness     Right Knee   Tenderness in the lateral joint line and medial joint line.      Active Range of Motion   Left Knee   Extension: WFL    Right Knee   Extension: WFL    Passive Range of Motion   Left Knee   Prone flexion: 110 degrees     Right Knee Prone flexion: 100 degrees     Strength/Myotome Testing     Left Hip   Planes of Motion   Flexion: 4  Extension: 4  Abduction: 4+  Adduction: 4+    Right Hip   Planes of Motion   Flexion: 4  Extension: 4-  Abduction: 3+  Adduction: 4+    Left Knee   Flexion: 4  Extension: 4+    Right Knee   Flexion: 4-  Extension: 4+    Left Ankle/Foot   Dorsiflexion: 4+    Right Ankle/Foot   Dorsiflexion: 4+    Ambulation     Observational Gait   Gait: antalgic     Functional Assessment      Squat    Sitting toward left side and right valgus.               Precautions: previous left knee menisectomy      Manuals 9/28            Right hamstring stm DB                                                   Neuro Re-Ed                                                                                                        Ther Ex             bike nv            Quad stretch             Elevated HR nv            Side stepping band nv            Standing knee felx 5# nv                                                   Ther Activity                                       Gait Training                                       Modalities

## 2023-10-05 ENCOUNTER — OFFICE VISIT (OUTPATIENT)
Dept: PHYSICAL THERAPY | Facility: CLINIC | Age: 68
End: 2023-10-05
Payer: COMMERCIAL

## 2023-10-05 DIAGNOSIS — M17.11 PRIMARY OSTEOARTHRITIS OF RIGHT KNEE: ICD-10-CM

## 2023-10-05 DIAGNOSIS — M25.561 ACUTE PAIN OF RIGHT KNEE: Primary | ICD-10-CM

## 2023-10-05 PROCEDURE — 97140 MANUAL THERAPY 1/> REGIONS: CPT

## 2023-10-05 PROCEDURE — 97110 THERAPEUTIC EXERCISES: CPT

## 2023-10-05 NOTE — PROGRESS NOTES
Daily Note     Today's date: 10/5/2023  Patient name: Brittney Boss  : 1955  MRN: [id number]  Referring provider: Valdez Valencia  Dx:   Encounter Diagnosis     ICD-10-CM    1. Acute pain of right knee  M25.561       2. Primary osteoarthritis of right knee  M17.11                      Subjective: pt states that he was on vacation and did well. Did not wear brace after airport. He did have to stop and then move slower when [place]* to right hip discomfort      Objective: See treatment diary below      Assessment: Tolerated treatment with good form of exercises performed throughout treatment session. . Patient did fatigue in BLE with program but no pain in knee. Plan: Continue per plan of care.       Precautions: previous left knee menisectomy      Manuals 9/28 10/5           Right hamstring stm DB DL                                                  Neuro Re-Ed                                                                                                        Ther Ex             Bike for strengthening and endurance nv lv1 5'           Quad stretch  30"x3           Elevated HR nv 2x10           Side stepping band nv Blue 10ft x4           Standing knee felx 5# nv 5#  2x10                                                  Ther Activity                                       Gait Training                                       Modalities

## 2023-10-12 ENCOUNTER — OFFICE VISIT (OUTPATIENT)
Dept: PHYSICAL THERAPY | Facility: CLINIC | Age: 68
End: 2023-10-12
Payer: COMMERCIAL

## 2023-10-12 DIAGNOSIS — M17.11 PRIMARY OSTEOARTHRITIS OF RIGHT KNEE: ICD-10-CM

## 2023-10-12 DIAGNOSIS — M25.561 ACUTE PAIN OF RIGHT KNEE: Primary | ICD-10-CM

## 2023-10-12 PROCEDURE — 97140 MANUAL THERAPY 1/> REGIONS: CPT | Performed by: PHYSICAL THERAPIST

## 2023-10-12 PROCEDURE — 97110 THERAPEUTIC EXERCISES: CPT | Performed by: PHYSICAL THERAPIST

## 2023-10-12 NOTE — PROGRESS NOTES
Daily Note     Today's date: 10/12/2023  Patient name: Jass Redman  : 1955  MRN: [id number]  Referring provider: Abiel Holt  Dx:   Encounter Diagnosis     ICD-10-CM    1. Acute pain of right knee  M25.561       2. Primary osteoarthritis of right knee  M17.11                      Subjective: Pt notes that his knee has been okay. He was golfing and went with out his brace. He also notes taht he was in [facility] and was able to walk on the beach and did okay      Objective: See treatment diary below      Assessment: pt is doing well but has not been doing all the prescribed exericses at home. Pt is having less ttp of the left hamstring but did have some trigger points on the distal lateral HS today that released well with manuals. Advised pt to return to all exericses to improve overall strength especially with hip extension  Plan: Continue per plan of care.       Precautions: previous left knee menisectomy      Manuals 9/28 10/5 10/12          Right hamstring stm DB DL DB                                                 Neuro Re-Ed             Ball bridge   2x10          Bridge ham iso     5''x2 X10                                                                            Ther Ex             Bike for strengthening and endurance nv lv1 5' Lv 2 8'          Quad stretch  30"x3 30''x4          Elevated HR nv 2x10 2x10          Side stepping band nv Blue 10ft x4 Blue 10ft x6          Standing knee flex 5# nv 5#  2x10 5# 2x10                                                 Ther Activity                                       Gait Training                                       Modalities

## 2023-10-19 ENCOUNTER — OFFICE VISIT (OUTPATIENT)
Dept: PHYSICAL THERAPY | Facility: CLINIC | Age: 68
End: 2023-10-19
Payer: COMMERCIAL

## 2023-10-19 DIAGNOSIS — M17.11 PRIMARY OSTEOARTHRITIS OF RIGHT KNEE: ICD-10-CM

## 2023-10-19 DIAGNOSIS — M25.561 ACUTE PAIN OF RIGHT KNEE: Primary | ICD-10-CM

## 2023-10-19 PROCEDURE — 97110 THERAPEUTIC EXERCISES: CPT | Performed by: PHYSICAL THERAPIST

## 2023-10-19 PROCEDURE — 97140 MANUAL THERAPY 1/> REGIONS: CPT | Performed by: PHYSICAL THERAPIST

## 2023-10-26 ENCOUNTER — APPOINTMENT (OUTPATIENT)
Dept: PHYSICAL THERAPY | Facility: CLINIC | Age: 68
End: 2023-10-26
Payer: COMMERCIAL

## 2023-10-31 ENCOUNTER — OFFICE VISIT (OUTPATIENT)
Dept: OBGYN CLINIC | Facility: MEDICAL CENTER | Age: 68
End: 2023-10-31
Payer: COMMERCIAL

## 2023-10-31 VITALS
WEIGHT: 281.2 LBS | SYSTOLIC BLOOD PRESSURE: 143 MMHG | BODY MASS INDEX: 40.26 KG/M2 | DIASTOLIC BLOOD PRESSURE: 81 MMHG | HEART RATE: 65 BPM | HEIGHT: 70 IN

## 2023-10-31 DIAGNOSIS — M17.11 PRIMARY OSTEOARTHRITIS OF RIGHT KNEE: Primary | ICD-10-CM

## 2023-10-31 PROCEDURE — 99213 OFFICE O/P EST LOW 20 MIN: CPT | Performed by: ORTHOPAEDIC SURGERY

## 2023-10-31 NOTE — PROGRESS NOTES
Assessment/Plan:  1. Primary osteoarthritis of right knee      No orders of the defined types were placed in this encounter. Patient has moderate right knee osteoarthritis. Patient doing well after right knee csi at last visit. Aware he can repeat in 6 weeks or in March prior to trip. He may also return for injection in the left knee if needed. Continue diclofenac tabs as needed for pain. Continue PT. May transition to home exercises once comfortable. Continue short hinge knee brace as needed for comfort. Return in about 6 weeks (around 12/12/2023) for right knee recheck. I answered all of the patient's questions during the visit and provided education of the patient's condition during the visit. The patient verbalized understanding of the information given and agrees with the plan. This note was dictated using [employer] software. It may contain errors including improperly dictated words. Please contact physician directly for any questions. Subjective   Chief Complaint:   Chief Complaint   Patient presents with    Right Knee - Follow-up       HPI  Brittney Boss is a 79 y.o. male who presents for follow up for right knee pain. Patient received right knee steroid injection at his last visit and reports good ongoing pain relief. Patient has also started outpatient PT and finds this has been helpful overall. They are doing soft tissue massage which patient finds helpful. Patient is taking diclofenac tabs as needed for pain. Patient was wearing a short hinge knee brace which was helpful but has stopped wearing it since doing better now. Patient also notes some minor left knee pain and tenderness. Patient has left knee scope 2 years ago and is considering a steroid injection. Patient has a trip to [facility] in April and would like to time his injections prior to this trip. Review of Systems  ROS:    See HPI for musculoskeletal review.    All other systems reviewed are negative History:  Past Medical History:   Diagnosis Date    Arthritis     b/l knees    today 2/22/2022  L knee scope    Colon polyp     CPAP (continuous positive airway pressure) dependence     Diabetes mellitus (HCC)     GERD (gastroesophageal reflux disease)     Hyperlipidemia     Hypertension     Internal derangement of knee involving medial meniscus     Resolved 6/5/2015     Lyme disease     Resolved 11/11/2016     Obesity 2018    Sleep apnea      Past Surgical History:   Procedure Laterality Date    ARTHROSCOPY KNEE Left 2/22/2022    Procedure: ARTHROSCOPY KNEE PARTIAL MEDIAL MENISCECTOMY;  Surgeon: Francia Jose DO;  Location: [facility];  Service: Orthopedics    COLONOSCOPY  11/06/2020    All locations appeared normal, no evidence of polyps or masses, small internal hemorrhoids, 5 year recall November 2025    KNEE ARTHROSCOPY Right 1991    UPPER GASTROINTESTINAL ENDOSCOPY  11/06/2020    Normal esophagus, normal stomach (large J-shaped stomach but grossly normal), normal duodenum. Biopsies negative for celiac disease, metaplasia, dysplasia, H pylori, positive for reactive gastropathy.      Social History   Social History     Substance and Sexual Activity   Alcohol Use Not Currently    Comment: 1 x monthly     Social History     Substance and Sexual Activity   Drug Use No     Social History     Tobacco Use   Smoking Status Never    Passive exposure: Never   Smokeless Tobacco Never     Family History:   Family History   Problem Relation Age of Onset    Cancer Mother     Colon cancer Mother     Diabetes Father     Heart disease Father     Hypertension Father     Cancer Sister        Current Outpatient Medications on File Prior to Visit   Medication Sig Dispense Refill    amLODIPine (NORVASC) 10 mg tablet TAKE 1 TABLET BY MOUTH EVERY DAY 90 tablet 1    celecoxib (CeleBREX) 200 mg capsule Take 1 capsule (200 mg total) by mouth daily PRN for pain 30 capsule 0    diclofenac (VOLTAREN) 75 mg EC tablet Take 1 tablet (75 mg total) by mouth 2 (two) times a day as needed (pain) Take with food. 60 tablet 2    fluticasone (FLONASE) 50 mcg/act nasal spray SPRAY 1 SPRAY INTO EACH NOSTRIL EVERY DAY 24 mL 3    lisinopril (ZESTRIL) 40 mg tablet TAKE 1 TABLET BY MOUTH EVERY DAY 90 tablet 1    loratadine (CLARITIN) 10 mg tablet Take 1 tablet by mouth as needed      Multiple Vitamin (ONE-A-DAY MENS PO) Take by mouth in the morning      naproxen sodium (ALEVE) 220 MG tablet       omeprazole (PriLOSEC) 20 mg delayed release capsule Take 20 mg by mouth daily      predniSONE 10 mg tablet 6 tabs on Day 1,5 tabs on Day 2,4 tabs on Day 3,3 tabs on Day 4,2 tabs on  Day 5And 1 tab on Day 6 21 tablet 1     No current facility-administered medications on file prior to visit.      Allergies   Allergen Reactions    Pollen Extract Allergic Rhinitis        Objective     /81   Pulse 65   Ht 5' 10" (1.778 m)   Wt 128 kg (281 lb 3.2 oz)   BMI 40.35 kg/m²      PE:  AAOx 3  WDWN  Hearing intact, no drainage from eyes  no audible wheezing  no abdominal distension  LE compartments soft, skin intact    Ortho Exam:  right Knee:   No erythema  no swelling  no effusion  no warmth  No TTP  AROM: 0- 115  Stable to varus/valgus stress    Scribe Attestation      I,:  Sydney Reyes PA-C am acting as a scribe while in the presence of the attending physician.:       I,:  Ghada Malcolm, DO personally performed the services described in this documentation    as scribed in my presence.:

## 2023-11-09 ENCOUNTER — OFFICE VISIT (OUTPATIENT)
Dept: PHYSICAL THERAPY | Facility: CLINIC | Age: 68
End: 2023-11-09
Payer: COMMERCIAL

## 2023-11-09 DIAGNOSIS — M25.561 ACUTE PAIN OF RIGHT KNEE: Primary | ICD-10-CM

## 2023-11-09 DIAGNOSIS — M17.11 PRIMARY OSTEOARTHRITIS OF RIGHT KNEE: ICD-10-CM

## 2023-11-09 PROCEDURE — 97110 THERAPEUTIC EXERCISES: CPT | Performed by: PHYSICAL THERAPIST

## 2023-11-09 NOTE — PROGRESS NOTES
Daily Note     Today's date: 2023  Patient name: Eva Gordillo  : 1955  MRN: [id number]  Referring provider: Elaina Felipe  Dx:   Encounter Diagnosis     ICD-10-CM    1. Acute pain of right knee  M25.561       2. Primary osteoarthritis of right knee  M17.11                      Subjective: pt notes that he saw ortho and she felt as if he was doing well. He feels he is doing okay also with biggest complaint being tightness in the calf with waking in the am.       Objective: See treatment diary below      Assessment: spoke with pt about exercises, Hep, and trial of self massage with massage gun. Pt was very pleased with massage gun and feels as if this could help keep discomfort at bay. Pt given ideas/options for purchase. At this time pt doing well. Is independent with exericses at this time we will hold PT and trial HEP only     Plan: d/c to hep at this time.     Precautions: previous left knee menisectomy      Manuals 9/28 10/5 10/12 10/19 11/9        Right hamstring stm DB DL DB DB Self massage gastroc                                                Neuro Re-Ed             Ball bridge   2x10 2x10         Bridge ham iso     5''x2 X10  5''x2x10         Ball toss 3 way    Blue 2x10 ea                                                             Ther Ex             Bike for strengthening and endurance nv lv1 5' Lv 2 8' Lv 2 10' Lv 2 8'        Quad stretch  30"x3 30''x4 30''x4         Elevated HR nv 2x10 2x10 3x10         Side stepping band nv Blue 10ft x4 Blue 10ft x6 Blk 10ft x6 a         Standing knee flex 5# nv 5#  2x10 5# 2x10 5# 3x10         Standing hip abd    5# 2x10         Bosu squat    2x10                      Ther Activity                                       Gait Training                                       Modalities

## 2023-11-16 ENCOUNTER — APPOINTMENT (OUTPATIENT)
Dept: PHYSICAL THERAPY | Facility: CLINIC | Age: 68
End: 2023-11-16
Payer: COMMERCIAL

## 2023-12-04 ENCOUNTER — TELEPHONE (OUTPATIENT)
Dept: SLEEP CENTER | Facility: CLINIC | Age: 68
End: 2023-12-04

## 2023-12-04 NOTE — TELEPHONE ENCOUNTER
Returned the patient's call last seen 7/20/2018 Left call back message   He left a message asking for CPAP supplies.      Patient will need to schedule an appointment

## 2023-12-08 DIAGNOSIS — I10 ESSENTIAL HYPERTENSION: ICD-10-CM

## 2023-12-08 RX ORDER — LISINOPRIL 40 MG/1
TABLET ORAL
Qty: 90 TABLET | Refills: 1 | Status: SHIPPED | OUTPATIENT
Start: 2023-12-08

## 2023-12-08 RX ORDER — AMLODIPINE BESYLATE 10 MG/1
TABLET ORAL
Qty: 90 TABLET | Refills: 1 | Status: SHIPPED | OUTPATIENT
Start: 2023-12-08

## 2023-12-13 ENCOUNTER — OFFICE VISIT (OUTPATIENT)
Dept: OBGYN CLINIC | Facility: MEDICAL CENTER | Age: 68
End: 2023-12-13
Payer: COMMERCIAL

## 2023-12-13 VITALS
BODY MASS INDEX: 40.94 KG/M2 | SYSTOLIC BLOOD PRESSURE: 126 MMHG | DIASTOLIC BLOOD PRESSURE: 77 MMHG | HEIGHT: 70 IN | WEIGHT: 286 LBS | HEART RATE: 66 BPM

## 2023-12-13 DIAGNOSIS — G89.29 CHRONIC PAIN OF BOTH KNEES: ICD-10-CM

## 2023-12-13 DIAGNOSIS — M17.0 BILATERAL PRIMARY OSTEOARTHRITIS OF KNEE: Primary | ICD-10-CM

## 2023-12-13 DIAGNOSIS — M25.561 CHRONIC PAIN OF BOTH KNEES: ICD-10-CM

## 2023-12-13 DIAGNOSIS — M25.562 CHRONIC PAIN OF BOTH KNEES: ICD-10-CM

## 2023-12-13 PROCEDURE — 99213 OFFICE O/P EST LOW 20 MIN: CPT | Performed by: PHYSICIAN ASSISTANT

## 2023-12-13 PROCEDURE — 20610 DRAIN/INJ JOINT/BURSA W/O US: CPT | Performed by: PHYSICIAN ASSISTANT

## 2023-12-13 RX ORDER — BUPIVACAINE HYDROCHLORIDE 2.5 MG/ML
2 INJECTION, SOLUTION INFILTRATION; PERINEURAL
Status: COMPLETED | OUTPATIENT
Start: 2023-12-13 | End: 2023-12-13

## 2023-12-13 RX ORDER — TRIAMCINOLONE ACETONIDE 40 MG/ML
40 INJECTION, SUSPENSION INTRA-ARTICULAR; INTRAMUSCULAR
Status: COMPLETED | OUTPATIENT
Start: 2023-12-13 | End: 2023-12-13

## 2023-12-13 RX ADMIN — BUPIVACAINE HYDROCHLORIDE 2 ML: 2.5 INJECTION, SOLUTION INFILTRATION; PERINEURAL at 10:00

## 2023-12-13 RX ADMIN — TRIAMCINOLONE ACETONIDE 40 MG: 40 INJECTION, SUSPENSION INTRA-ARTICULAR; INTRAMUSCULAR at 10:00

## 2023-12-13 NOTE — PROGRESS NOTES
Assessment/Plan:  1. Bilateral primary osteoarthritis of knee    2. Chronic pain of both knees      Orders Placed This Encounter   Procedures    Large joint arthrocentesis       Patient has moderate bilateral knee osteoarthritis. Patient received bilateral knee steroid injections today. Tolerated the procedures well. Post injection instructions reviewed. Continue diclofenac tabs as needed for pain. Continue home exercise program as learned at PT. Continue short hinge knee brace as needed for comfort. Return in about 3 months (around 3/13/2024) for bilat knee CSI. I answered all of the patient's questions during the visit and provided education of the patient's condition during the visit. The patient verbalized understanding of the information given and agrees with the plan. This note was dictated using [employer] software. It may contain errors including improperly dictated words. Please contact physician directly for any questions. Subjective   Chief Complaint:   Chief Complaint   Patient presents with    Left Knee - Follow-up, Pain    Right Knee - Follow-up, Pain       HPI  Nneka Herrera is a 79 y.o. male who presents for follow up for bilateral knees. Patient received right knee steroid injection last on 9/13/23 and bilateral knee steroid injections prior to that on 4/16/21. Patient has hx of left knee scope. Patient reports bilateral knee pain today, left knee worse than right knee. Pain is described as generalized achy discomfort with occasional sharp pain in the anterior knee. Patient is taking diclofenac tabs as needed for pain. Patient completed outpatient PT and continues to do his home exercises. Patient would like repeat steroid injections today. He plans to return in march for bilat knee CSI prior to his trip to [facility] in April. Review of Systems  ROS:    See HPI for musculoskeletal review.    All other systems reviewed are negative     History:  Past Medical History: Diagnosis Date    Arthritis     b/l knees    today 2/22/2022  L knee scope    Colon polyp     CPAP (continuous positive airway pressure) dependence     Diabetes mellitus (HCC)     GERD (gastroesophageal reflux disease)     Hyperlipidemia     Hypertension     Internal derangement of knee involving medial meniscus     Resolved 6/5/2015     Lyme disease     Resolved 11/11/2016     Obesity 2018    Sleep apnea      Past Surgical History:   Procedure Laterality Date    ARTHROSCOPY KNEE Left 2/22/2022    Procedure: ARTHROSCOPY KNEE PARTIAL MEDIAL MENISCECTOMY;  Surgeon: Demetri Valdes DO;  Location: [facility];  Service: Orthopedics    COLONOSCOPY  11/06/2020    All locations appeared normal, no evidence of polyps or masses, small internal hemorrhoids, 5 year recall November 2025    KNEE ARTHROSCOPY Right 1991    UPPER GASTROINTESTINAL ENDOSCOPY  11/06/2020    Normal esophagus, normal stomach (large J-shaped stomach but grossly normal), normal duodenum. Biopsies negative for celiac disease, metaplasia, dysplasia, H pylori, positive for reactive gastropathy.      Social History   Social History     Substance and Sexual Activity   Alcohol Use Not Currently    Comment: 1 x monthly     Social History     Substance and Sexual Activity   Drug Use No     Social History     Tobacco Use   Smoking Status Never    Passive exposure: Never   Smokeless Tobacco Never     Family History:   Family History   Problem Relation Age of Onset    Cancer Mother     Colon cancer Mother     Diabetes Father     Heart disease Father     Hypertension Father     Cancer Sister        Current Outpatient Medications on File Prior to Visit   Medication Sig Dispense Refill    amLODIPine (NORVASC) 10 mg tablet TAKE 1 TABLET BY MOUTH EVERY DAY 90 tablet 1    celecoxib (CeleBREX) 200 mg capsule Take 1 capsule (200 mg total) by mouth daily PRN for pain 30 capsule 0    diclofenac (VOLTAREN) 75 mg EC tablet Take 1 tablet (75 mg total) by mouth 2 (two) times a day as needed (pain) Take with food. 60 tablet 2    fluticasone (FLONASE) 50 mcg/act nasal spray SPRAY 1 SPRAY INTO EACH NOSTRIL EVERY DAY 24 mL 3    lisinopril (ZESTRIL) 40 mg tablet TAKE 1 TABLET BY MOUTH EVERY DAY 90 tablet 1    loratadine (CLARITIN) 10 mg tablet Take 1 tablet by mouth as needed      Multiple Vitamin (ONE-A-DAY MENS PO) Take by mouth in the morning      naproxen sodium (ALEVE) 220 MG tablet       omeprazole (PriLOSEC) 20 mg delayed release capsule Take 20 mg by mouth daily      predniSONE 10 mg tablet 6 tabs on Day 1,5 tabs on Day 2,4 tabs on Day 3,3 tabs on Day 4,2 tabs on  Day 5And 1 tab on Day 6 21 tablet 1     No current facility-administered medications on file prior to visit. Allergies   Allergen Reactions    Pollen Extract Allergic Rhinitis        Objective     /77   Pulse 66   Ht 5' 10" (1.778 m)   Wt 130 kg (286 lb)   BMI 41.04 kg/m²      PE:  AAOx 3  WDWN  Hearing intact, no drainage from eyes  no audible wheezing  no abdominal distension  LE compartments soft, skin intact    Ortho Exam:  bilateral Knee:   No erythema  no swelling  no effusion  no warmth  Healed incisions over anterior left knee   No TTP  AROM: 0- 115  Stable to varus/valgus stress    Large joint arthrocentesis: bilateral knee  Universal Protocol:  Consent: Verbal consent obtained.   Risks and benefits: risks, benefits and alternatives were discussed  Consent given by: patient  Site marked: the operative site was marked  Supporting Documentation  Indications: pain   Procedure Details  Location: knee - bilateral knee  Preparation: Patient was prepped and draped in the usual sterile fashion  Needle size: 22 G  Ultrasound guidance: no  Approach: anteromedial    Medications (Right): 2 mL bupivacaine 0.25 %; 40 mg triamcinolone acetonide 40 mg/mLMedications (Left): 2 mL bupivacaine 0.25 %; 40 mg triamcinolone acetonide 40 mg/mL   Patient tolerance: patient tolerated the procedure well with no immediate complications  Dressing:  Sterile dressing applied            Scribe Attestation      I,:  Mateo Roth PA-C am acting as a scribe while in the presence of the attending physician.:       I,:  Clarke Card DO personally performed the services described in this documentation    as scribed in my presence.:

## 2023-12-16 DIAGNOSIS — M25.561 ACUTE PAIN OF RIGHT KNEE: ICD-10-CM

## 2023-12-16 DIAGNOSIS — M17.11 PRIMARY OSTEOARTHRITIS OF RIGHT KNEE: ICD-10-CM

## 2023-12-18 RX ORDER — DICLOFENAC SODIUM 75 MG/1
75 TABLET, DELAYED RELEASE ORAL 2 TIMES DAILY PRN
Qty: 60 TABLET | Refills: 2 | Status: SHIPPED | OUTPATIENT
Start: 2023-12-18

## 2023-12-22 ENCOUNTER — OFFICE VISIT (OUTPATIENT)
Dept: SLEEP CENTER | Facility: CLINIC | Age: 68
End: 2023-12-22
Payer: COMMERCIAL

## 2023-12-22 VITALS
SYSTOLIC BLOOD PRESSURE: 148 MMHG | DIASTOLIC BLOOD PRESSURE: 75 MMHG | HEIGHT: 70 IN | BODY MASS INDEX: 40.94 KG/M2 | WEIGHT: 286 LBS | HEART RATE: 65 BPM

## 2023-12-22 DIAGNOSIS — G47.33 OSA (OBSTRUCTIVE SLEEP APNEA): Primary | ICD-10-CM

## 2023-12-22 DIAGNOSIS — K21.9 GASTROESOPHAGEAL REFLUX DISEASE, UNSPECIFIED WHETHER ESOPHAGITIS PRESENT: ICD-10-CM

## 2023-12-22 DIAGNOSIS — R09.81 NASAL CONGESTION: ICD-10-CM

## 2023-12-22 DIAGNOSIS — R40.0 DAYTIME SLEEPINESS: ICD-10-CM

## 2023-12-22 DIAGNOSIS — R68.2 DRY MOUTH: ICD-10-CM

## 2023-12-22 DIAGNOSIS — I10 PRIMARY HYPERTENSION: ICD-10-CM

## 2023-12-22 DIAGNOSIS — I10 ESSENTIAL HYPERTENSION: ICD-10-CM

## 2023-12-22 DIAGNOSIS — E66.01 MORBID OBESITY (HCC): ICD-10-CM

## 2023-12-22 PROCEDURE — 99204 OFFICE O/P NEW MOD 45 MIN: CPT | Performed by: INTERNAL MEDICINE

## 2023-12-22 NOTE — PATIENT INSTRUCTIONS
Strategies to improve dry mouth were discussed. Specifically, adequate treatment of nasal allergies, adjusting heat and humidity settings on PAP machine, using Biotene mouthwash &/or Xylimelt.    Nasal symptoms may improve with regular nasal saline rinse up to twice a day, followed by topical nasal steroid (e..g. OTC Flonase, Nasacort) once a day if necessary.    Nursing Support:  When: Monday through Friday 7A-5PM except holidays  Where: Our direct line is [phone].    If you are having a true emergency please call 911.  In the event that the line is busy or it is after hours please leave a voice message and we will return your call.  Please speak clearly, leaving your full name, birth date, best number to reach you and the reason for your call.   Medication refills: We will need the name of the medication, the dosage, the ordering provider, whether you get a 30 or 90 day refill, and the pharmacy name and address.  Medications will be ordered by the provider only.  Nurses cannot call in prescriptions.  Please allow 7 days for medication refills.  Physician requested updates: If your provider requested that you call with an update after starting medication, please be ready to provide us the medication and dosage, what time you take your medication, the time you attempt to fall asleep, time you fall asleep, when you wake up, and what time you get out of bed.  Sleep Study Results: We will contact you with sleep study results and/or next steps after the physician has reviewed your testing.

## 2023-12-22 NOTE — PROGRESS NOTES
Consultation - Sleep Center   Ac Villaseñor  68 y.o. male  :1955  MRN:[id number]  DOS:2023    Physician Requesting Consult: Hi Sauceda DO             Reason for Consult : [At your kind request] I saw cA Villaseñor for initial sleep evaluation today.   He was diagnosed with obstructive sleep apnea in the past and uses CPAP.  He is here because he needs supplies and replacement of his machine.    Results of prior studies: A diagnostic study in  demonstrated an AHI of 26/h, considerably higher during REM at 68/h.  Minimum oxygen saturation was 74% and he spent 22.4% of time asleep with saturations below 90%. During a titration study in 2017, sleep disordered breathing was successfully treated with CPAP at 17 cm H2O    PFSH, Problem List, Medications & Allergies were reviewed in EMR.    Ac  has a past medical history of Arthritis, Colon polyp, CPAP (continuous positive airway pressure) dependence, Diabetes mellitus (HCC), GERD (gastroesophageal reflux disease), Hyperlipidemia, Hypertension, Internal derangement of knee involving medial meniscus, Lyme disease, Obesity (2018), and Sleep apnea.      He has a current medication list which includes the following prescription(s): amlodipine, celecoxib, diclofenac, lisinopril, loratadine, multiple vitamin, naproxen sodium, omeprazole, fluticasone, and prednisone.      HPI: Patient received ot a Dream Station Version 2 machine from [email or web address] [several months] ago..  Patient reports has [not] been using [employer] to sanitize the machine.  Ac reports awakening with significantly dry mouth.  Compliance data shows use for more than 4 hours 63% of the time.  Respiratory event index is 3.8/h at 17 cm H2O other complaints: [none]. Restless Leg Syndrome: reports no suggestive symptoms towards frequent nocturnal leg cramps..    Parasomnia: no features reported    Sleep Routine (averaged): [ ] Typical Bedtime: 9 PM.  Gets OOB: 6 AM. TIB:9 hrs.   Sleep  "latency:<  30 minutes Sleep Interruptions:  2-3,  not [always] sure of the cause and able to fall back asleep. Awakens:[ ] needing an alarm  Upon awakening: [ ]is not always refreshed[ ].[He estimates getting 8 hrs sleep.] Daytime Function:Ac reports [Excessive] [Daytime] Sleepiness [ ][takes planned naps] for up to an hour. He rated [himself] at Total score: 9 /24 on the [place] Sleepiness Scale.     Habits:   reports that he has never smoked. He has never been exposed to tobacco smoke. He has never used smokeless tobacco.[ ];  reports that he does not currently use alcohol.[ ];[ reports no history of drug use.];[  E-Cigarette/Vaping    E-Cigarette Use Never User    ]; Caffeine use:rarely[ ]; Exercise routine: sometimes[ ].    Occupation:     Family History: Brother has insomnia  ROS: [Significant for weight has been stable].  Nasal symptoms due to seasonal allergies, feels constant congestion and habitually breathes through the mouth at night.  Acid reflux is controlled.    EXAM:  /75 (BP Location: Left arm, Patient Position: Sitting, Cuff Size: Large)   Pulse 65   Ht 5' 10\" (1.778 m)   Wt 130 kg (286 lb)   BMI 41.04 kg/m²    General: [Well] groomed male, well appearing, in [no apparent] distress.   Neurological: Alert [and orientated]; [ ]cooperative; Cranial nerves intact;    Psychiatric: Speech:[clear and coherent];[ ] Normal mood, affect & thought   Skin: [warm and dry]; Color& Hydration [good]; [no] facial rashes or lesions   HEENT:  Craniofacial anatomy: normal Sinuses: [non-] tender. TMJ: [Normal]    Eyes: EOM's [intact];[ ] conjunctiva/corneas clear   Ears: [appear normal]     Nasal Airway: airflow is reduced Septum:[intact]; Turbinates: [normal]; Rhinorrhea: [None]   Mouth: Lips: [normal posture]; Dentition: normal . Mucosa:[moist] [ ]; Hard Palate:normal    Oropharryx: crowded and AP narrowing Tongue: Mallampati:Class IV, Mobile, and MacroglossiaSoft Palate:  redundant  and Uvular " "Hypertrophy Tonsils: absent  Neck:[is thick and excess fatty tissue;] [Neck Circumference: 19.75 \";] Supple; [no] abnormal masses; Thyroid:[normal]. Trachea:[central].    Lymph: [No] Cervical [or] Submandibular Lymhadenopathy  Heart: S1,S2 normal; [RRR]; [no] gallop; [no] murmur   Lungs: Respiratory Effort:[normal]. Air entry [good] [bilaterally]. [No] wheezes. [No] rales  Abdomen: [Obese,] Soft & non-tender    Extremities: [No] pedal edema.  [No] clubbing or cyanosis.    Musculoskeletal:  Motor [normal]; Gait:[normal].       Serial CMP's have demonstrated elevated CO2 levels up to 32 mmol/L and the last at 28 mmol/L, elevated fasting glucose and otherwise unremarkable.  CBC demonstrated normal hemoglobin and hematocrit    IMPRESSION: Primary/Secondary Sleep Diagnoses (to Medical or Psych conditions) & Comorbidities   1. NINI (obstructive sleep apnea)  CPAP Study    PAP DME Resupply/Reorder      2. Daytime sleepiness  CPAP Study      3. Essential hypertension        4. Nasal congestion        5. Dry mouth        6. Gastroesophageal reflux disease, unspecified whether esophagitis present        7. Primary hypertension        8. Morbid obesity (HCC)             PLAN:   1. I reviewed results of the Sleep studies with the patient.   2. With respect to above conditions, I counseled on pathophysiology, diagnosis, treatment options, risks and benefits; inter-relationship and effects on symptoms and comorbidities; risks of no treatment; costs and insurance aspects.   3. Patient elected to continue positive airway pressure therapy and it is medically necessary.  Care coordinated with the DME provider for supplies.  Pressure settin-20 cm H2O  4.  I advised increasing hours of use of CPAP.  If drowsiness persists, a retitration study would be warranted.  5.  Strategies to improve dry mouth were discussed. Specifically, adequate treatment of nasal allergies, adjusting heat and humidity settings on PAP machine, using Biotene " "mouthwash &/or Xylimelt.  6.  Nasal symptoms may [improve] with regular nasal saline rinse up to twice a day, followed by topical nasal steroid (e..g. OTC Flonase, Nasacort) once a day if necessary.  If symptoms persist, he may need to see ENT.  7. Follow-up to be scheduled in 3 months to monitor compliance, progress and to adjust therapy.  Thank you for allowing me to participate in the care of this patient. I will keep you apprised of developments.    Sincerely,      Authenticated electronically on 12/22/23   Board Certified Specialist     Portions of the record may have been created with voice recognition software. Occasional wrong word or \"sound a like\" substitutions may have occurred due to the inherent limitations of voice recognition software. There may also be notations and random deletions of words or characters from malfunctioning software. Read the chart carefully and recognize, using context, where substitutions/deletions have occurred.     "

## 2023-12-27 ENCOUNTER — TELEPHONE (OUTPATIENT)
Dept: SLEEP CENTER | Facility: CLINIC | Age: 68
End: 2023-12-27

## 2023-12-29 LAB

## 2024-01-15 ENCOUNTER — TELEPHONE (OUTPATIENT)
Dept: FAMILY MEDICINE CLINIC | Facility: CLINIC | Age: 69
End: 2024-01-15

## 2024-01-15 DIAGNOSIS — E11.9 TYPE 2 DIABETES MELLITUS WITHOUT COMPLICATION, WITHOUT LONG-TERM CURRENT USE OF INSULIN (HCC): Primary | ICD-10-CM

## 2024-01-15 NOTE — TELEPHONE ENCOUNTER
Patient called he would like to re-start his Metformin, unsure of strength or how many times a day.  Having symptoms again.  Scheduled AWV for 03/15/24.  Uses [facility].

## 2024-01-18 ENCOUNTER — RA CDI HCC (OUTPATIENT)
Dept: OTHER | Facility: HOSPITAL | Age: 69
End: 2024-01-18

## 2024-01-24 ENCOUNTER — OFFICE VISIT (OUTPATIENT)
Dept: FAMILY MEDICINE CLINIC | Facility: CLINIC | Age: 69
End: 2024-01-24
Payer: COMMERCIAL

## 2024-01-24 VITALS
RESPIRATION RATE: 18 BRPM | OXYGEN SATURATION: 95 % | HEART RATE: 74 BPM | DIASTOLIC BLOOD PRESSURE: 86 MMHG | HEIGHT: 70 IN | WEIGHT: 285 LBS | BODY MASS INDEX: 40.8 KG/M2 | TEMPERATURE: 98.5 F | SYSTOLIC BLOOD PRESSURE: 136 MMHG

## 2024-01-24 DIAGNOSIS — Z12.5 SCREENING FOR PROSTATE CANCER: ICD-10-CM

## 2024-01-24 DIAGNOSIS — Z00.00 MEDICARE ANNUAL WELLNESS VISIT, INITIAL: Primary | ICD-10-CM

## 2024-01-24 DIAGNOSIS — I10 PRIMARY HYPERTENSION: ICD-10-CM

## 2024-01-24 DIAGNOSIS — E11.8 TYPE 2 DIABETES MELLITUS WITH UNSPECIFIED COMPLICATIONS (HCC): ICD-10-CM

## 2024-01-24 DIAGNOSIS — Z79.899 HIGH RISK MEDICATION USE: ICD-10-CM

## 2024-01-24 DIAGNOSIS — R35.0 URINARY FREQUENCY: ICD-10-CM

## 2024-01-24 DIAGNOSIS — E78.5 HYPERLIPIDEMIA, UNSPECIFIED HYPERLIPIDEMIA TYPE: ICD-10-CM

## 2024-01-24 DIAGNOSIS — E11.9 TYPE 2 DIABETES MELLITUS WITHOUT COMPLICATION, WITHOUT LONG-TERM CURRENT USE OF INSULIN (HCC): ICD-10-CM

## 2024-01-24 LAB — SL AMB POCT HEMOGLOBIN AIC: 8.3 (ref ?–6.5)

## 2024-01-24 PROCEDURE — 83036 HEMOGLOBIN GLYCOSYLATED A1C: CPT | Performed by: FAMILY MEDICINE

## 2024-01-24 PROCEDURE — G0438 PPPS, INITIAL VISIT: HCPCS | Performed by: FAMILY MEDICINE

## 2024-01-24 NOTE — PATIENT INSTRUCTIONS
Medicare Preventive Visit Patient Instructions  Thank you for completing your Welcome to Medicare Visit or Medicare Annual Wellness Visit today. Your next wellness visit will be due in one year (1/24/2025).  The screening/preventive services that you may require over the next 5-10 years are detailed below. Some tests may not apply to you based off risk factors and/or age. Screening tests ordered at today's visit but not completed yet may show as past due. Also, please note that scanned in results may not display below.  Preventive Screenings:  Service Recommendations Previous Testing/Comments   Colorectal Cancer Screening  Colonoscopy    Fecal Occult Blood Test (FOBT)/Fecal Immunochemical Test (FIT)  Fecal DNA/Cologuard Test  Flexible Sigmoidoscopy Age: 45-75 years old   Colonoscopy: every 10 years (May be performed more frequently if at higher risk)  OR  FOBT/FIT: every 1 year  OR  Cologuard: every 3 years  OR  Sigmoidoscopy: every 5 years  Screening may be recommended earlier than age 45 if at higher risk for colorectal cancer. Also, an individualized decision between you and your healthcare provider will decide whether screening between the ages of 76-85 would be appropriate. Colonoscopy: 11/06/2020  FOBT/FIT: Not on file  Cologuard: Not on file  Sigmoidoscopy: Not on file          Prostate Cancer Screening Individualized decision between patient and health care provider in men between ages of 55-69   Medicare will cover every 12 months beginning on the day after your 50th birthday PSA: 0.5 ng/mL           Hepatitis C Screening Once for adults born between 1945 and 1965  More frequently in patients at high risk for Hepatitis C Hep C Antibody: 03/16/2019        Diabetes Screening 1-2 times per year if you're at risk for diabetes or have pre-diabetes Fasting glucose: No results in last 5 years (No results in last 5 years)  A1C: 7.1 % (12/28/2022)      Cholesterol Screening Once every 5 years if you don't have a  lipid disorder. May order more often based on risk factors. Lipid panel: 12/28/2022         Other Preventive Screenings Covered by Medicare:  Abdominal Aortic Aneurysm (AAA) Screening: covered once if your at risk. You're considered to be at risk if you have a family history of AAA or a male between the age of 65-75 who smoking at least 100 cigarettes in your lifetime.  Lung Cancer Screening: covers low dose CT scan once per year if you meet all of the following conditions: (1) Age 55-77; (2) No signs or symptoms of lung cancer; (3) Current smoker or have quit smoking within the last 15 years; (4) You have a tobacco smoking history of at least 20 pack years (packs per day x number of years you smoked); (5) You get a written order from a healthcare provider.  Glaucoma Screening: covered annually if you're considered high risk: (1) You have diabetes OR (2) Family history of glaucoma OR (3)  aged 50 and older OR (4)  American aged 65 and older  Osteoporosis Screening: covered every 2 years if you meet one of the following conditions: (1) Have a vertebral abnormality; (2) On glucocorticoid therapy for more than 3 months; (3) Have primary hyperparathyroidism; (4) On osteoporosis medications and need to assess response to drug therapy.  HIV Screening: covered annually if you're between the age of 15-65. Also covered annually if you are younger than 15 and older than 65 with risk factors for HIV infection. For pregnant patients, it is covered up to 3 times per pregnancy.    Immunizations:  Immunization Recommendations   Influenza Vaccine Annual influenza vaccination during flu season is recommended for all persons aged >= 6 months who do not have contraindications   Pneumococcal Vaccine   * Pneumococcal conjugate vaccine = PCV13 (Prevnar 13), PCV15 (Vaxneuvance), PCV20 (Prevnar 20)  * Pneumococcal polysaccharide vaccine = PPSV23 (Pneumovax) Adults 19-65 yo with certain risk factors or if 65+ yo  If  never received any pneumonia vaccine: recommend Prevnar 20 (PCV20)  Give PCV20 if previously received 1 dose of PCV13 or PPSV23   Hepatitis B Vaccine 3 dose series if at intermediate or high risk (ex: diabetes, end stage renal disease, liver disease)   Respiratory syncytial virus (RSV) Vaccine - COVERED BY MEDICARE PART D  * RSVPreF3 (Arexvy) CDC recommends that adults 60 years of age and older may receive a single dose of RSV vaccine using shared clinical decision-making (SCDM)   Tetanus (Td) Vaccine - COST NOT COVERED BY MEDICARE PART B Following completion of primary series, a booster dose should be given every 10 years to maintain immunity against tetanus. Td may also be given as tetanus wound prophylaxis.   Tdap Vaccine - COST NOT COVERED BY MEDICARE PART B Recommended at least once for all adults. For pregnant patients, recommended with each pregnancy.   Shingles Vaccine (Shingrix) - COST NOT COVERED BY MEDICARE PART B  2 shot series recommended in those 19 years and older who have or will have weakened immune systems or those 50 years and older     Health Maintenance Due:      Topic Date Due   • Colorectal Cancer Screening  11/06/2025   • Hepatitis C Screening  Completed     Immunizations Due:      Topic Date Due   • Pneumococcal Vaccine: 65+ Years (1 - PCV) Never done   • Influenza Vaccine (1) 09/01/2023   • COVID-19 Vaccine (4 - 2023-24 season) 09/01/2023     Advance Directives   What are advance directives?  Advance directives are legal documents that state your wishes and plans for medical care. These plans are made ahead of time in case you lose your ability to make decisions for yourself. Advance directives can apply to any medical decision, such as the treatments you want, and if you want to donate organs.   What are the types of advance directives?  There are many types of advance directives, and each state has rules about how to use them. You may choose a combination of any of the following:  Living  will:  This is a written record of the treatment you want. You can also choose which treatments you do not want, which to limit, and which to stop at a certain time. This includes surgery, medicine, IV fluid, and tube feedings.   Durable power of  for healthcare (DPAHC):  This is a written record that states who you want to make healthcare choices for you when you are unable to make them for yourself. This person, called a proxy, is usually a family member or a friend. You may choose more than 1 proxy.  Do not resuscitate (DNR) order:  A DNR order is used in case your heart stops beating or you stop breathing. It is a request not to have certain forms of treatment, such as CPR. A DNR order may be included in other types of advance directives.  Medical directive:  This covers the care that you want if you are in a coma, near death, or unable to make decisions for yourself. You can list the treatments you want for each condition. Treatment may include pain medicine, surgery, blood transfusions, dialysis, IV or tube feedings, and a ventilator (breathing machine).  Values history:  This document has questions about your views, beliefs, and how you feel and think about life. This information can help others choose the care that you would choose.  Why are advance directives important?  An advance directive helps you control your care. Although spoken wishes may be used, it is better to have your wishes written down. Spoken wishes can be misunderstood, or not followed. Treatments may be given even if you do not want them. An advance directive may make it easier for your family to make difficult choices about your care.   Weight Management   Why it is important to manage your weight:  Being overweight increases your risk of health conditions such as heart disease, high blood pressure, type 2 diabetes, and certain types of cancer. It can also increase your risk for osteoarthritis, sleep apnea, and other respiratory  problems. Aim for a slow, steady weight loss. Even a small amount of weight loss can lower your risk of health problems.  How to lose weight safely:  A safe and healthy way to lose weight is to eat fewer calories and get regular exercise. You can lose up about 1 pound a week by decreasing the number of calories you eat by 500 calories each day.   Healthy meal plan for weight management:  A healthy meal plan includes a variety of foods, contains fewer calories, and helps you stay healthy. A healthy meal plan includes the following:  Eat whole-grain foods more often.  A healthy meal plan should contain fiber. Fiber is the part of grains, fruits, and vegetables that is not broken down by your body. Whole-grain foods are healthy and provide extra fiber in your diet. Some examples of whole-grain foods are whole-wheat breads and pastas, oatmeal, brown rice, and bulgur.  Eat a variety of vegetables every day.  Include dark, leafy greens such as spinach, kale, jakob greens, and mustard greens. Eat yellow and orange vegetables such as carrots, sweet potatoes, and winter squash.   Eat a variety of fruits every day.  Choose fresh or canned fruit (canned in its own juice or light syrup) instead of juice. Fruit juice has very little or no fiber.  Eat low-fat dairy foods.  Drink fat-free (skim) milk or 1% milk. Eat fat-free yogurt and low-fat cottage cheese. Try low-fat cheeses such as mozzarella and other reduced-fat cheeses.  Choose meat and other protein foods that are low in fat.  Choose beans or other legumes such as split peas or lentils. Choose fish, skinless poultry (chicken or turkey), or lean cuts of red meat (beef or pork). Before you cook meat or poultry, cut off any visible fat.   Use less fat and oil.  Try baking foods instead of frying them. Add less fat, such as margarine, sour cream, regular salad dressing and mayonnaise to foods. Eat fewer high-fat foods. Some examples of high-fat foods include french fries,  doughnuts, ice cream, and cakes.  Eat fewer sweets.  Limit foods and drinks that are high in sugar. This includes candy, cookies, regular soda, and sweetened drinks.  Exercise:  Exercise at least 30 minutes per day on most days of the week. Some examples of exercise include walking, biking, dancing, and swimming. You can also fit in more physical activity by taking the stairs instead of the elevator or parking farther away from stores. Ask your healthcare provider about the best exercise plan for you.      © Copyright [employer] 2018 Information is for End User's use only and may not be sold, redistributed or otherwise used for commercial purposes. All illustrations and images included in CareNotes® are the copyrighted property of [employer] or [employer]

## 2024-01-24 NOTE — PROGRESS NOTES
Assessment and Plan:     Problem List Items Addressed This Visit          Endocrine    Type 2 diabetes mellitus with unspecified complications (HCC)    Relevant Medications    metFORMIN (GLUCOPHAGE) 500 mg tablet       Cardiovascular and Mediastinum    HTN (hypertension)       Other    Body mass index (BMI) 40.0-44.9, adult (HCC)     Other Visit Diagnoses       Medicare annual wellness visit, initial    -  Primary    Urinary frequency        Relevant Orders    US kidney and bladder with pvr    Hyperlipidemia, unspecified hyperlipidemia type        Relevant Orders    Lipid panel    Screening for prostate cancer        Relevant Orders    PSA Total (Reflex To Free)    High risk medication use        Relevant Orders    CBC            Medicare wellness visit completed  Labs ordered  Patient continue current medications I redo metformin  Patient will follow-up in 3 to 4 months or sooner if needed        Depression Screening and Follow-up Plan: Patient was screened for depression during today's encounter. They screened negative with a PHQ-2 score of 0.      Preventive health issues were discussed with patient, and age appropriate screening tests were ordered as noted in patient's After Visit Summary.  Personalized health advice and appropriate referrals for health education or preventive services given if needed, as noted in patient's After Visit Summary.     History of Present Illness:     Patient presents for a Medicare Wellness Visit    Patient presents today for check of chronic conditions and Medicare wellness visit  He unfortunately has now back to being diabetic his A1c is 8.0  He was having vision changes  Patient also has urinary frequency    And Medicare wellness visit  Patient Care Team:  Hi Sauceda DO as PCP - General  iH Sauceda DO as PCP - PCP-[facility] ([facility])     Review of Systems:     Review of Systems   Constitutional: Negative.    HENT: Negative.     Eyes: Negative.    Respiratory: Negative.      Cardiovascular: Negative.    Gastrointestinal: Negative.    Endocrine: Negative.    Genitourinary:  Positive for frequency.   Musculoskeletal: Negative.    Skin: Negative.    Allergic/Immunologic: Negative.    Neurological: Negative.    Hematological: Negative.    Psychiatric/Behavioral: Negative.     All other systems reviewed and are negative.       Problem List:     Patient Active Problem List   Diagnosis    Allergic rhinitis    Fatty liver    GERD without esophagitis    HTN (hypertension)    Obesity (BMI 35.0-39.9 without comorbidity)    Osteoarthritis of knee    Sleep apnea    Leg swelling    Personal history of colonic polyps    Internal derangement of knee involving medial meniscus    CPAP (continuous positive airway pressure) dependence    Type 2 diabetes mellitus with unspecified complications (HCC)    Body mass index (BMI) 40.0-44.9, adult (HCC)    Acute pain of right knee    NINI (obstructive sleep apnea)      Past Medical and Surgical History:     Past Medical History:   Diagnosis Date    Arthritis     b/l knees    today 2/22/2022  L knee scope    Colon polyp     CPAP (continuous positive airway pressure) dependence     Diabetes mellitus (HCC)     GERD (gastroesophageal reflux disease)     Hyperlipidemia     Hypertension     Internal derangement of knee involving medial meniscus     Resolved 6/5/2015     Lyme disease     Resolved 11/11/2016     Obesity 2018    Sleep apnea      Past Surgical History:   Procedure Laterality Date    ARTHROSCOPY KNEE Left 2/22/2022    Procedure: ARTHROSCOPY KNEE PARTIAL MEDIAL MENISCECTOMY;  Surgeon: Rekha Hunt DO;  Location: [facility];  Service: Orthopedics    COLONOSCOPY  11/06/2020    All locations appeared normal, no evidence of polyps or masses, small internal hemorrhoids, 5 year recall November 2025    KNEE ARTHROSCOPY Right 1991    UPPER GASTROINTESTINAL ENDOSCOPY  11/06/2020    Normal esophagus, normal stomach (large J-shaped stomach but grossly  normal), normal duodenum.  Biopsies negative for celiac disease, metaplasia, dysplasia, H pylori, positive for reactive gastropathy.      Family History:     Family History   Problem Relation Age of Onset    Cancer Mother     Colon cancer Mother     Diabetes Father     Heart disease Father     Hypertension Father     Cancer Sister       Social History:     Social History     Socioeconomic History    Marital status: /Civil Union     Spouse name: None    Number of children: None    Years of education: None    Highest education level: None   Occupational History    None   Tobacco Use    Smoking status: Never     Passive exposure: Never    Smokeless tobacco: Never   Vaping Use    Vaping status: Never Used   Substance and Sexual Activity    Alcohol use: Not Currently     Comment: 1 x monthly    Drug use: No    Sexual activity: Yes     Partners: Female   Other Topics Concern    None   Social History Narrative    Currently working    Denies alcohol use causing problems    Sedentary lifestyle    Social alcohol use - As per [employer]      Social Determinants of Health     Financial Resource Strain: Low Risk  (1/24/2024)    Overall Financial Resource Strain (CARDIA)     Difficulty of Paying Living Expenses: Not hard at all   Food Insecurity: No Food Insecurity (2/9/2021)    Hunger Vital Sign     Worried About Running Out of Food in the Last Year: Never true     Ran Out of Food in the Last Year: Never true   Transportation Needs: No Transportation Needs (1/24/2024)    PRAPARE - Transportation     Lack of Transportation (Medical): No     Lack of Transportation (Non-Medical): No   Physical Activity: Inactive (12/10/2021)    Exercise Vital Sign     Days of Exercise per Week: 0 days     Minutes of Exercise per Session: 0 min   Stress: No Stress Concern Present (12/10/2021)    [employer] - Occupational Stress Questionnaire     Feeling of Stress : Not at all   Social Connections: Moderately  Isolated (9/18/2020)    Social Connection and Isolation Panel [NHANES]     Frequency of Communication with Friends and Family: More than three times a week     Frequency of Social Gatherings with Friends and Family: Once a week     Attends [employer] Services: Never     Active Member of Clubs or Organizations: No     Attends Club or Organization Meetings: Never     Marital Status:    Intimate Partner Violence: Not At Risk (3/30/2023)    Humiliation, Afraid, Rape, and Kick questionnaire     Fear of Current or Ex-Partner: No     Emotionally Abused: No     Physically Abused: No     Sexually Abused: No   Housing Stability: Not on file      Medications and Allergies:     Current Outpatient Medications   Medication Sig Dispense Refill    amLODIPine (NORVASC) 10 mg tablet TAKE 1 TABLET BY MOUTH EVERY DAY 90 tablet 1    diclofenac (VOLTAREN) 75 mg EC tablet TAKE 1 TABLET BY MOUTH 2 TIMES A DAY AS NEEDED (PAIN) TAKE WITH FOOD 60 tablet 2    lisinopril (ZESTRIL) 40 mg tablet TAKE 1 TABLET BY MOUTH EVERY DAY 90 tablet 1    loratadine (CLARITIN) 10 mg tablet Take 1 tablet by mouth as needed      metFORMIN (GLUCOPHAGE) 500 mg tablet Take 1 tablet (500 mg total) by mouth 2 (two) times a day with meals 60 tablet 1    Multiple Vitamin (ONE-A-DAY MENS PO) Take by mouth in the morning      omeprazole (PriLOSEC) 20 mg delayed release capsule Take 20 mg by mouth daily      celecoxib (CeleBREX) 200 mg capsule Take 1 capsule (200 mg total) by mouth daily PRN for pain (Patient not taking: Reported on 1/24/2024) 30 capsule 0    naproxen sodium (ALEVE) 220 MG tablet  (Patient not taking: Reported on 1/24/2024)       No current facility-administered medications for this visit.     Allergies   Allergen Reactions    Pollen Extract Allergic Rhinitis      Immunizations:     Immunization History   Administered Date(s) Administered    COVID-19 MODERNA VACC 0.5 ML IM 05/14/2021, 06/11/2021, 01/16/2022    Influenza Quadrivalent, 6-35 Months IM  10/02/2015, 11/11/2016    Influenza, recombinant, quadrivalent,injectable, preservative free 09/18/2020, 12/10/2021    Tdap 04/27/2011      Health Maintenance:         Topic Date Due    Colorectal Cancer Screening  11/06/2025    Hepatitis C Screening  Completed         Topic Date Due    Pneumococcal Vaccine: 65+ Years (1 - PCV) Never done    Influenza Vaccine (1) 09/01/2023    COVID-19 Vaccine (4 - 2023-24 season) 09/01/2023      Medicare Screening Tests and Risk Assessments:     Ac is here for his Subsequent Wellness visit. Last Medicare Wellness visit information reviewed, patient interviewed and updates made to the record today.      Health Risk Assessment:   Patient rates overall health as fair. Patient feels that their physical health rating is same. Patient is satisfied with their life. Eyesight was rated as same. Hearing was rated as same. Patient feels that their emotional and mental health rating is same. Patients states they are never, rarely angry. Patient states they are never, rarely unusually tired/fatigued. Pain experienced in the last 7 days has been none. Patient states that he has experienced no weight loss or gain in last 6 months.     Depression Screening:   PHQ-2 Score: 0      Fall Risk Screening:   In the past year, patient has experienced: no history of falling in past year      Home Safety:  Patient does not have trouble with stairs inside or outside of their home. Patient has working smoke alarms and has working carbon monoxide detector. Home safety hazards include: none.     Medications:   Patient is currently taking over-the-counter supplements. OTC medications include: see medication list. Patient is able to manage medications.     Activities of Daily Living (ADLs)/Instrumental Activities of Daily Living (IADLs):   Walk and transfer into and out of bed and chair?: Yes  Dress and groom yourself?: Yes    Bathe or shower yourself?: Yes    Feed yourself? Yes  Do your  laundry/housekeeping?: Yes  Manage your money, pay your bills and track your expenses?: Yes  Make your own meals?: Yes    Do your own shopping?: Yes    Previous Hospitalizations:   Any hospitalizations or ED visits within the last 12 months?: No      Advance Care Planning:   Living will: Yes    Durable POA for healthcare: Yes    Advanced directive: Yes    Advanced directive counseling given: Yes    End of Life Decisions reviewed with patient: Yes    Provider agrees with end of life decisions: Yes      Cognitive Screening:   Provider or family/friend/caregiver concerned regarding cognition?: No    PREVENTIVE SCREENINGS      Cardiovascular Screening:    General: History Lipid Disorder and Screening Current      Diabetes Screening:     General: History Diabetes and Screening Current      Colorectal Cancer Screening:     General: Screening Current      Prostate Cancer Screening:    General: Screening Current      Osteoporosis Screening:    General: Screening Not Indicated      Abdominal Aortic Aneurysm (AAA) Screening:    Risk factors include: age between 65-74 yo        General: Screening Not Indicated      Lung Cancer Screening:     General: Screening Not Indicated      Hepatitis C Screening:    General: Screening Current    Screening, Brief Intervention, and Referral to Treatment (SBIRT)    Screening    Typical number of drinks in a week: 0    Single Item Drug Screening:  How often have you used an illegal drug (including marijuana) or a prescription medication for non-medical reasons in the past year? never    Single Item Drug Screen Score: 0  Interpretation: Negative screen for possible drug use disorder    Brief Intervention  Alcohol & drug use screenings were reviewed. No concerns regarding substance use disorder identified.     Other Counseling Topics:   Car/seat belt/driving safety, skin self-exam, sunscreen and calcium and vitamin D intake and regular weightbearing exercise.     No results found.     Physical  "Exam:     /86 (BP Location: Left arm, Patient Position: Sitting, Cuff Size: Large)   Pulse 74   Temp 98.5 °F (36.9 °C) (Tympanic)   Resp 18   Ht 5' 10\" (1.778 m)   Wt 129 kg (285 lb)   SpO2 95%   BMI 40.89 kg/m²     Physical Exam  Vitals and nursing note reviewed.   Constitutional:       Appearance: Normal appearance. He is well-developed.   HENT:      Head: Normocephalic.      Right Ear: External ear normal.      Left Ear: External ear normal.      Nose: Nose normal.   Eyes:      Conjunctiva/sclera: Conjunctivae normal.      Pupils: Pupils are equal, round, and reactive to light.   Cardiovascular:      Rate and Rhythm: Normal rate and regular rhythm.      Pulses: no weak pulses          Dorsalis pedis pulses are 2+ on the right side and 2+ on the left side.        Posterior tibial pulses are 2+ on the right side and 2+ on the left side.      Heart sounds: Normal heart sounds.   Pulmonary:      Effort: Pulmonary effort is normal.      Breath sounds: Normal breath sounds.   Abdominal:      General: Bowel sounds are normal.      Palpations: Abdomen is soft.   Musculoskeletal:         General: Normal range of motion.      Cervical back: Normal range of motion and neck supple.   Feet:      Right foot:      Skin integrity: No ulcer, skin breakdown, erythema, warmth, callus or dry skin.      Left foot:      Skin integrity: No ulcer, skin breakdown, erythema, warmth, callus or dry skin.   Skin:     General: Skin is warm and dry.   Neurological:      General: No focal deficit present.      Mental Status: He is alert and oriented to person, place, and time.   Psychiatric:         Behavior: Behavior normal.         Thought Content: Thought content normal.         Judgment: Judgment normal.          Diabetic Foot Exam    Patient's shoes and socks removed.    Right Foot/Ankle   Right Foot Inspection  Skin Exam: skin normal. Skin not intact, no dry skin, no warmth, no callus, no erythema, no maceration, no abnormal " color, no pre-ulcer, no ulcer and no callus.     Toe Exam: ROM and strength within normal limits.     Sensory   Vibration: intact  Proprioception: intact  Monofilament testing: intact    Vascular  Capillary refills: < 3 seconds  The right DP pulse is 2+. The right PT pulse is 2+.     Left Foot/Ankle  Left Foot Inspection  Skin Exam: skin normal. Skin not intact, no dry skin, no warmth, no erythema, no maceration, normal color, no pre-ulcer, no ulcer and no callus.     Toe Exam: ROM and strength within normal limits.     Sensory   Vibration: intact  Proprioception: intact  Monofilament testing: intact    Vascular  Capillary refills: < 3 seconds  The left DP pulse is 2+. The left PT pulse is 2+.     Assign Risk Category  No deformity present  No loss of protective sensation  No weak pulses  Risk: 0        Hi Page, DO

## 2024-01-25 ENCOUNTER — VBI (OUTPATIENT)
Dept: ADMINISTRATIVE | Facility: OTHER | Age: 69
End: 2024-01-25

## 2024-01-25 ENCOUNTER — TELEPHONE (OUTPATIENT)
Dept: ADMINISTRATIVE | Facility: OTHER | Age: 69
End: 2024-01-25

## 2024-01-25 NOTE — LETTER
Diabetic Eye Exam Form    Date Requested: 24  Patient: Ac Villaseñor  Patient : 1955   Referring Provider: Hi Sauceda DO      DIABETIC Eye Exam Date _______________________________      Type of Exam MUST be documented for Diabetic Eye Exams. Please CHECK ONE.     Retinal Exam       Dilated Retinal Exam       OCT       Optomap-Iris Exam      Fundus Photography       Left Eye - Please check Retinopathy or No Retinopathy        Exam did show retinopathy    Exam did not show retinopathy       Right Eye - Please check Retinopathy or No Retinopathy       Exam did show retinopathy    Exam did not show retinopathy       Comments __________________________________________________________    Practice Providing Exam ______________________________________________    Exam Performed By (print name) _______________________________________      Provider Signature ___________________________________________________      These reports are needed for  compliance.  Please fax this completed form and a copy of the Diabetic Eye Exam report to our office located at [address] [place] [address] as soon as possible via Fax [phone] attention Rekha: Phone [phone]  We thank you for your assistance in treating our mutual patient.

## 2024-01-25 NOTE — TELEPHONE ENCOUNTER
----- Message from Hi Sauceda DO sent at 1/24/2024  4:11 PM EST -----  Regarding: care gap request  01/24/24 4:11 PM    Hello, our patient attached above has had Diabetic Eye Exam completed/performed. Please assist in updating the patient chart by making an External outreach to eye care of the [facility] . The date of service is recently.    Thank you,  Hi Sauceda  [facility]

## 2024-01-25 NOTE — TELEPHONE ENCOUNTER
Upon review of the In Basket request and the patient's chart, initial outreach has been made via fax to facility. Please see Contacts section for details.     Thank you  Rekha Myrick

## 2024-01-28 LAB
ALBUMIN SERPL-MCNC: 4.2 G/DL (ref 3.9–4.9)
ALBUMIN/CREAT UR: 4 MG/G CREAT (ref 0–29)
ALBUMIN/GLOB SERPL: 1.4 {RATIO} (ref 1.2–2.2)
ALP SERPL-CCNC: 62 IU/L (ref 44–121)
ALT SERPL-CCNC: 108 IU/L (ref 0–44)
APPEARANCE UR: CLEAR
AST SERPL-CCNC: 69 IU/L (ref 0–40)
BILIRUB SERPL-MCNC: 0.8 MG/DL (ref 0–1.2)
BILIRUB UR QL STRIP: NEGATIVE
BUN SERPL-MCNC: 15 MG/DL (ref 8–27)
BUN/CREAT SERPL: 15 (ref 10–24)
CALCIUM SERPL-MCNC: 9.7 MG/DL (ref 8.6–10.2)
CHLORIDE SERPL-SCNC: 100 MMOL/L (ref 96–106)
CHOLEST SERPL-MCNC: 156 MG/DL (ref 100–199)
CHOLEST/HDLC SERPL: 4.9 RATIO (ref 0–5)
CO2 SERPL-SCNC: 26 MMOL/L (ref 20–29)
COLOR UR: YELLOW
CREAT SERPL-MCNC: 0.97 MG/DL (ref 0.76–1.27)
CREAT UR-MCNC: 132.5 MG/DL
EGFR: 86 ML/MIN/1.73
ERYTHROCYTE [DISTWIDTH] IN BLOOD BY AUTOMATED COUNT: 11.9 % (ref 11.6–15.4)
GLOBULIN SER-MCNC: 2.9 G/DL (ref 1.5–4.5)
GLUCOSE SERPL-MCNC: 124 MG/DL (ref 70–99)
GLUCOSE UR QL: NEGATIVE
HCT VFR BLD AUTO: 42.9 % (ref 37.5–51)
HDLC SERPL-MCNC: 32 MG/DL
HGB BLD-MCNC: 14.9 G/DL (ref 13–17.7)
HGB UR QL STRIP: NEGATIVE
KETONES UR QL STRIP: NEGATIVE
LDLC SERPL CALC-MCNC: 105 MG/DL (ref 0–99)
LEUKOCYTE ESTERASE UR QL STRIP: NEGATIVE
MCH RBC QN AUTO: 33.7 PG (ref 26.6–33)
MCHC RBC AUTO-ENTMCNC: 34.7 G/DL (ref 31.5–35.7)
MCV RBC AUTO: 97 FL (ref 79–97)
MICRO URNS: NORMAL
MICROALBUMIN UR-MCNC: 5.1 UG/ML
NITRITE UR QL STRIP: NEGATIVE
PH UR STRIP: 5.5 [PH] (ref 5–7.5)
PLATELET # BLD AUTO: 201 X10E3/UL (ref 150–450)
POTASSIUM SERPL-SCNC: 4.7 MMOL/L (ref 3.5–5.2)
PROT SERPL-MCNC: 7.1 G/DL (ref 6–8.5)
PROT UR QL STRIP: NEGATIVE
PSA SERPL-MCNC: 0.5 NG/ML (ref 0–4)
RBC # BLD AUTO: 4.42 X10E6/UL (ref 4.14–5.8)
SL AMB REFLEX CRITERIA: NORMAL
SL AMB VLDL CHOLESTEROL CALC: 19 MG/DL (ref 5–40)
SODIUM SERPL-SCNC: 140 MMOL/L (ref 134–144)
SP GR UR: 1.02 (ref 1–1.03)
TRIGL SERPL-MCNC: 104 MG/DL (ref 0–149)
UROBILINOGEN UR STRIP-ACNC: 1 MG/DL (ref 0.2–1)
WBC # BLD AUTO: 5.4 X10E3/UL (ref 3.4–10.8)

## 2024-02-01 NOTE — TELEPHONE ENCOUNTER
As a follow-up, a second attempt has been made for outreach via telephone call to facility. Please see Contacts section for details.    Thank you  Rekha Myrick

## 2024-02-02 NOTE — TELEPHONE ENCOUNTER
Upon review of the In Basket request we were able to locate, review, and update the patient chart as requested for Diabetic Eye Exam.    Any additional questions or concerns should be emailed to the Practice Liaisons via the appropriate education email address, please do not reply via In Basket.    Thank you  Rekha Myrick

## 2024-02-04 ENCOUNTER — HOSPITAL ENCOUNTER (OUTPATIENT)
Dept: ULTRASOUND IMAGING | Facility: HOSPITAL | Age: 69
Discharge: HOME | End: 2024-02-04
Payer: COMMERCIAL

## 2024-02-04 DIAGNOSIS — R35.0 URINARY FREQUENCY: ICD-10-CM

## 2024-02-04 PROCEDURE — 76770 US EXAM ABDO BACK WALL COMP: CPT

## 2024-03-13 ENCOUNTER — TELEPHONE (OUTPATIENT)
Dept: OBGYN CLINIC | Facility: MEDICAL CENTER | Age: 69
End: 2024-03-13

## 2024-03-13 NOTE — TELEPHONE ENCOUNTER
The patient was here in Dr. Hunt's office today to discuss CSI. He last underwent CSI at his previous visit on 12/13/23.  He is going to [facility] a the end of April and does not want to be in pain for his trip.  However, his previous injections worked very well for him and he is still not experiencing any pain.  We have decided to postpone his injections today due to his lack of pain.  We will plan to see him back on 4/5 or 4/10 for CSI just prior to his trip to [facility].

## 2024-03-15 ENCOUNTER — OFFICE VISIT (OUTPATIENT)
Dept: FAMILY MEDICINE CLINIC | Facility: CLINIC | Age: 69
End: 2024-03-15
Payer: COMMERCIAL

## 2024-03-15 VITALS
HEIGHT: 69 IN | WEIGHT: 266.7 LBS | OXYGEN SATURATION: 96 % | SYSTOLIC BLOOD PRESSURE: 132 MMHG | TEMPERATURE: 97.7 F | RESPIRATION RATE: 16 BRPM | BODY MASS INDEX: 39.5 KG/M2 | DIASTOLIC BLOOD PRESSURE: 78 MMHG | HEART RATE: 66 BPM

## 2024-03-15 DIAGNOSIS — E11.8 TYPE 2 DIABETES MELLITUS WITH UNSPECIFIED COMPLICATIONS (HCC): ICD-10-CM

## 2024-03-15 DIAGNOSIS — Z13.6 SCREENING FOR CARDIOVASCULAR CONDITION: ICD-10-CM

## 2024-03-15 DIAGNOSIS — L98.9 SKIN DISORDER: ICD-10-CM

## 2024-03-15 DIAGNOSIS — Z00.00 MEDICARE ANNUAL WELLNESS VISIT, SUBSEQUENT: Primary | ICD-10-CM

## 2024-03-15 LAB — SL AMB POCT HEMOGLOBIN AIC: 6.5 (ref ?–6.5)

## 2024-03-15 PROCEDURE — 83036 HEMOGLOBIN GLYCOSYLATED A1C: CPT | Performed by: FAMILY MEDICINE

## 2024-03-15 PROCEDURE — G0439 PPPS, SUBSEQ VISIT: HCPCS | Performed by: FAMILY MEDICINE

## 2024-03-15 NOTE — PROGRESS NOTES
Assessment and Plan:     Problem List Items Addressed This Visit          Endocrine    Type 2 diabetes mellitus with unspecified complications (HCC)    Relevant Orders    POCT hemoglobin A1c (Completed)     Other Visit Diagnoses       Medicare annual wellness visit, subsequent    -  Primary    Skin disorder        Relevant Orders    Ambulatory Referral to Dermatology    Screening for cardiovascular condition        Relevant Orders    CT coronary calcium score          Patient referred to dermatology  CT calcium score ordered  Patient's sugars are well-controlled he will continue his current medications  Medicare wellness visit completed  Follow-up in 6 months or sooner if needed            Depression Screening and Follow-up Plan: Patient was screened for depression during today's encounter. They screened negative with a PHQ-2 score of 0.      Preventive health issues were discussed with patient, and age appropriate screening tests were ordered as noted in patient's After Visit Summary.  Personalized health advice and appropriate referrals for health education or preventive services given if needed, as noted in patient's After Visit Summary.     History of Present Illness:     Patient presents for a Medicare Wellness Visit    Patient presents today for diabetic check  Overall he is doing well with no major complaints he is lost some significant weight recently  He does have an new skin growths forming all over his arms and body      Down 20 lbs in 2 months, cut out sugars, no fried foods, no potatoes. Improving diet[facility] and [facility]. Bumps on his face, some on his arms, one on his chest -- no drainage, pain, itching. Needs refill on metformin       Patient Care Team:  Hi Sauceda, DO as PCP - General  Hi Sauceda DO as PCP - PCP-[facility] ([facility])     Review of Systems:     Review of Systems   Constitutional: Negative.    HENT: Negative.     Eyes: Negative.    Respiratory: Negative.      Cardiovascular: Negative.    Gastrointestinal: Negative.    Endocrine: Negative.    Genitourinary: Negative.    Musculoskeletal: Negative.    Skin: Negative.    Allergic/Immunologic: Negative.    Neurological: Negative.    Hematological: Negative.    Psychiatric/Behavioral: Negative.     All other systems reviewed and are negative.       Problem List:     Patient Active Problem List   Diagnosis    Allergic rhinitis    Fatty liver    GERD without esophagitis    HTN (hypertension)    Obesity (BMI 35.0-39.9 without comorbidity)    Osteoarthritis of knee    Sleep apnea    Leg swelling    Personal history of colonic polyps    Internal derangement of knee involving medial meniscus    CPAP (continuous positive airway pressure) dependence    Type 2 diabetes mellitus with unspecified complications (HCC)    Body mass index (BMI) 40.0-44.9, adult (HCC)    Acute pain of right knee    NINI (obstructive sleep apnea)      Past Medical and Surgical History:     Past Medical History:   Diagnosis Date    Arthritis     b/l knees    today 2/22/2022  L knee scope    Colon polyp     CPAP (continuous positive airway pressure) dependence     Diabetes mellitus (HCC)     GERD (gastroesophageal reflux disease)     Hyperlipidemia     Hypertension     Internal derangement of knee involving medial meniscus     Resolved 6/5/2015     Lyme disease     Resolved 11/11/2016     Obesity 2018    Sleep apnea      Past Surgical History:   Procedure Laterality Date    ARTHROSCOPY KNEE Left 2/22/2022    Procedure: ARTHROSCOPY KNEE PARTIAL MEDIAL MENISCECTOMY;  Surgeon: Rehka Hunt DO;  Location: [facility];  Service: Orthopedics    COLONOSCOPY  11/06/2020    All locations appeared normal, no evidence of polyps or masses, small internal hemorrhoids, 5 year recall November 2025    KNEE ARTHROSCOPY Right 1991    UPPER GASTROINTESTINAL ENDOSCOPY  11/06/2020    Normal esophagus, normal stomach (large J-shaped stomach but grossly normal), normal  duodenum.  Biopsies negative for celiac disease, metaplasia, dysplasia, H pylori, positive for reactive gastropathy.      Family History:     Family History   Problem Relation Age of Onset    Cancer Mother     Colon cancer Mother     Diabetes Father     Heart disease Father     Hypertension Father     Cancer Sister       Social History:     Social History     Socioeconomic History    Marital status: /Civil Union     Spouse name: None    Number of children: None    Years of education: None    Highest education level: None   Occupational History    None   Tobacco Use    Smoking status: Never     Passive exposure: Never    Smokeless tobacco: Never   Vaping Use    Vaping status: Never Used   Substance and Sexual Activity    Alcohol use: Not Currently     Comment: 1 x monthly    Drug use: No    Sexual activity: Yes     Partners: Female   Other Topics Concern    None   Social History Narrative    Currently working    Denies alcohol use causing problems    Sedentary lifestyle    Social alcohol use - As per [employer]      Social Determinants of Health     Financial Resource Strain: Low Risk  (3/9/2024)    Overall Financial Resource Strain (CARDIA)     Difficulty of Paying Living Expenses: Not hard at all   Food Insecurity: No Food Insecurity (3/15/2024)    Hunger Vital Sign     Worried About Running Out of Food in the Last Year: Never true     Ran Out of Food in the Last Year: Never true   Transportation Needs: No Transportation Needs (3/15/2024)    PRAPARE - Transportation     Lack of Transportation (Medical): No     Lack of Transportation (Non-Medical): No   Physical Activity: Inactive (12/10/2021)    Exercise Vital Sign     Days of Exercise per Week: 0 days     Minutes of Exercise per Session: 0 min   Stress: No Stress Concern Present (12/10/2021)    [employer] [place] [employer] - Occupational Stress Questionnaire     Feeling of Stress : Not at all   Social Connections: Moderately Isolated (9/18/2020)     Social Connection and Isolation Panel [NHANES]     Frequency of Communication with Friends and Family: More than three times a week     Frequency of Social Gatherings with Friends and Family: Once a week     Attends Jainism Services: Never     Active Member of Clubs or Organizations: No     Attends Club or Organization Meetings: Never     Marital Status:    Intimate Partner Violence: Not At Risk (3/15/2024)    Humiliation, Afraid, Rape, and Kick questionnaire     Fear of Current or Ex-Partner: No     Emotionally Abused: No     Physically Abused: No     Sexually Abused: No   Housing Stability: Low Risk  (3/15/2024)    Housing Stability Vital Sign     Unable to Pay for Housing in the Last Year: No     Number of Places Lived in the Last Year: 1     Unstable Housing in the Last Year: No      Medications and Allergies:     Current Outpatient Medications   Medication Sig Dispense Refill    amLODIPine (NORVASC) 10 mg tablet TAKE 1 TABLET BY MOUTH EVERY DAY 90 tablet 1    celecoxib (CeleBREX) 200 mg capsule Take 1 capsule (200 mg total) by mouth daily PRN for pain 30 capsule 0    diclofenac (VOLTAREN) 75 mg EC tablet TAKE 1 TABLET BY MOUTH 2 TIMES A DAY AS NEEDED (PAIN) TAKE WITH FOOD 60 tablet 2    lisinopril (ZESTRIL) 40 mg tablet TAKE 1 TABLET BY MOUTH EVERY DAY 90 tablet 1    loratadine (CLARITIN) 10 mg tablet Take 1 tablet by mouth as needed      metFORMIN (GLUCOPHAGE) 500 mg tablet Take 1 tablet (500 mg total) by mouth 2 (two) times a day with meals 60 tablet 1    Multiple Vitamin (ONE-A-DAY MENS PO) Take by mouth in the morning      omeprazole (PriLOSEC) 20 mg delayed release capsule Take 20 mg by mouth daily      naproxen sodium (ALEVE) 220 MG tablet  (Patient not taking: Reported on 1/24/2024)       No current facility-administered medications for this visit.     Allergies   Allergen Reactions    Pollen Extract Allergic Rhinitis      Immunizations:     Immunization History   Administered Date(s)  Administered    COVID-19 MODERNA VACC 0.5 ML IM 05/14/2021, 06/11/2021, 01/16/2022    Influenza Quadrivalent, 6-35 Months IM 10/02/2015, 11/11/2016    Influenza, recombinant, quadrivalent,injectable, preservative free 09/18/2020, 12/10/2021    Tdap 04/27/2011      Health Maintenance:         Topic Date Due    Colorectal Cancer Screening  11/06/2025    Hepatitis C Screening  Completed         Topic Date Due    Pneumococcal Vaccine: 65+ Years (1 of 2 - PCV) Never done    Influenza Vaccine (1) 09/01/2023    COVID-19 Vaccine (4 - 2023-24 season) 09/01/2023      Medicare Screening Tests and Risk Assessments:     Ac is here for his Subsequent Wellness visit. Last Medicare Wellness visit information reviewed, patient interviewed and updates made to the record today.      Health Risk Assessment:   Patient rates overall health as good. Patient feels that their physical health rating is same. Patient is satisfied with their life. Eyesight was rated as same. Hearing was rated as same. Patient feels that their emotional and mental health rating is same. Patients states they are sometimes angry. Patient states they are sometimes unusually tired/fatigued. Pain experienced in the last 7 days has been none. Patient states that he has experienced weight loss or gain in last 6 months.     Depression Screening:   PHQ-2 Score: 0      Fall Risk Screening:   In the past year, patient has experienced: no history of falling in past year      Home Safety:  Patient does not have trouble with stairs inside or outside of their home. Patient has working smoke alarms and has working carbon monoxide detector. Home safety hazards include: none.     Nutrition:   Current diet is Diabetic and Low Carb.     Medications:   Patient is not currently taking any over-the-counter supplements. Patient is able to manage medications.     Activities of Daily Living (ADLs)/Instrumental Activities of Daily Living (IADLs):   Walk and transfer into and out of  bed and chair?: Yes  Dress and groom yourself?: Yes    Bathe or shower yourself?: Yes    Feed yourself? Yes  Do your laundry/housekeeping?: Yes  Manage your money, pay your bills and track your expenses?: Yes  Make your own meals?: Yes    Do your own shopping?: Yes    Previous Hospitalizations:   Any hospitalizations or ED visits within the last 12 months?: No      Advance Care Planning:   Living will: No    Durable POA for healthcare: No    Advanced directive: No      Cognitive Screening:   Provider or family/friend/caregiver concerned regarding cognition?: No    PREVENTIVE SCREENINGS      Cardiovascular Screening:    General: History Lipid Disorder and Screening Current      Diabetes Screening:     General: History Diabetes and Screening Current      Colorectal Cancer Screening:     General: Screening Current      Prostate Cancer Screening:    General: Screening Current      Osteoporosis Screening:    General: Screening Not Indicated      Abdominal Aortic Aneurysm (AAA) Screening:    Risk factors include: age between 65-74 yo        Lung Cancer Screening:     General: Screening Not Indicated      Hepatitis C Screening:    General: Screening Current    Screening, Brief Intervention, and Referral to Treatment (SBIRT)    Screening  Typical number of drinks in a day: 1  Typical number of drinks in a week: 2  Interpretation: Low risk drinking behavior.    AUDIT-C Screenin) How often did you have a drink containing alcohol in the past year? monthly or less  2) How many drinks did you have on a typical day when you were drinking in the past year? 1 to 2  3) How often did you have 6 or more drinks on one occasion in the past year? never    AUDIT-C Score: 1  Interpretation: Score 0-3 (male): Negative screen for alcohol misuse    Single Item Drug Screening:  How often have you used an illegal drug (including marijuana) or a prescription medication for non-medical reasons in the past year? never    Single Item Drug  "Screen Score: 0  Interpretation: Negative screen for possible drug use disorder    Brief Intervention  Alcohol & drug use screenings were reviewed. No concerns regarding substance use disorder identified.     Other Counseling Topics:   Car/seat belt/driving safety, skin self-exam, sunscreen and calcium and vitamin D intake and regular weightbearing exercise.     No results found.     Physical Exam:     /78 (BP Location: Right arm, Patient Position: Sitting, Cuff Size: Large)   Pulse 66   Temp 97.7 °F (36.5 °C) (Tympanic)   Resp 16   Ht 5' 8.9\" (1.75 m)   Wt 121 kg (266 lb 11.2 oz)   SpO2 96%   BMI 39.50 kg/m²     Physical Exam  Vitals and nursing note reviewed.   Constitutional:       Appearance: Normal appearance. He is well-developed.   HENT:      Head: Normocephalic.      Right Ear: External ear normal.      Left Ear: External ear normal.      Nose: Nose normal.   Eyes:      Conjunctiva/sclera: Conjunctivae normal.      Pupils: Pupils are equal, round, and reactive to light.   Cardiovascular:      Rate and Rhythm: Normal rate and regular rhythm.      Heart sounds: Normal heart sounds.   Pulmonary:      Effort: Pulmonary effort is normal.      Breath sounds: Normal breath sounds.   Abdominal:      General: Bowel sounds are normal.      Palpations: Abdomen is soft.   Musculoskeletal:         General: Normal range of motion.      Cervical back: Normal range of motion and neck supple.   Skin:     General: Skin is warm and dry.   Neurological:      General: No focal deficit present.      Mental Status: He is alert and oriented to person, place, and time.   Psychiatric:         Behavior: Behavior normal.         Thought Content: Thought content normal.         Judgment: Judgment normal.          Hi Sauceda DO  "

## 2024-03-15 NOTE — PATIENT INSTRUCTIONS
Medicare Preventive Visit Patient Instructions  Thank you for completing your Welcome to Medicare Visit or Medicare Annual Wellness Visit today. Your next wellness visit will be due in one year (3/16/2025).  The screening/preventive services that you may require over the next 5-10 years are detailed below. Some tests may not apply to you based off risk factors and/or age. Screening tests ordered at today's visit but not completed yet may show as past due. Also, please note that scanned in results may not display below.  Preventive Screenings:  Service Recommendations Previous Testing/Comments   Colorectal Cancer Screening  Colonoscopy    Fecal Occult Blood Test (FOBT)/Fecal Immunochemical Test (FIT)  Fecal DNA/Cologuard Test  Flexible Sigmoidoscopy Age: 45-75 years old   Colonoscopy: every 10 years (May be performed more frequently if at higher risk)  OR  FOBT/FIT: every 1 year  OR  Cologuard: every 3 years  OR  Sigmoidoscopy: every 5 years  Screening may be recommended earlier than age 45 if at higher risk for colorectal cancer. Also, an individualized decision between you and your healthcare provider will decide whether screening between the ages of 76-85 would be appropriate. Colonoscopy: 11/06/2020  FOBT/FIT: Not on file  Cologuard: Not on file  Sigmoidoscopy: Not on file          Prostate Cancer Screening Individualized decision between patient and health care provider in men between ages of 55-69   Medicare will cover every 12 months beginning on the day after your 50th birthday PSA: 0.5 ng/mL           Hepatitis C Screening Once for adults born between 1945 and 1965  More frequently in patients at high risk for Hepatitis C Hep C Antibody: 03/16/2019        Diabetes Screening 1-2 times per year if you're at risk for diabetes or have pre-diabetes Fasting glucose: No results in last 5 years (No results in last 5 years)  A1C: 6.5 (3/15/2024)      Cholesterol Screening Once every 5 years if you don't have a lipid  disorder. May order more often based on risk factors. Lipid panel: 01/27/2024         Other Preventive Screenings Covered by Medicare:  Abdominal Aortic Aneurysm (AAA) Screening: covered once if your at risk. You're considered to be at risk if you have a family history of AAA or a male between the age of 65-75 who smoking at least 100 cigarettes in your lifetime.  Lung Cancer Screening: covers low dose CT scan once per year if you meet all of the following conditions: (1) Age 55-77; (2) No signs or symptoms of lung cancer; (3) Current smoker or have quit smoking within the last 15 years; (4) You have a tobacco smoking history of at least 20 pack years (packs per day x number of years you smoked); (5) You get a written order from a healthcare provider.  Glaucoma Screening: covered annually if you're considered high risk: (1) You have diabetes OR (2) Family history of glaucoma OR (3)  aged 50 and older OR (4)  American aged 65 and older  Osteoporosis Screening: covered every 2 years if you meet one of the following conditions: (1) Have a vertebral abnormality; (2) On glucocorticoid therapy for more than 3 months; (3) Have primary hyperparathyroidism; (4) On osteoporosis medications and need to assess response to drug therapy.  HIV Screening: covered annually if you're between the age of 15-65. Also covered annually if you are younger than 15 and older than 65 with risk factors for HIV infection. For pregnant patients, it is covered up to 3 times per pregnancy.    Immunizations:  Immunization Recommendations   Influenza Vaccine Annual influenza vaccination during flu season is recommended for all persons aged >= 6 months who do not have contraindications   Pneumococcal Vaccine   * Pneumococcal conjugate vaccine = PCV13 (Prevnar 13), PCV15 (Vaxneuvance), PCV20 (Prevnar 20)  * Pneumococcal polysaccharide vaccine = PPSV23 (Pneumovax) Adults 19-63 yo with certain risk factors or if 65+ yo  If never  received any pneumonia vaccine: recommend Prevnar 20 (PCV20)  Give PCV20 if previously received 1 dose of PCV13 or PPSV23   Hepatitis B Vaccine 3 dose series if at intermediate or high risk (ex: diabetes, end stage renal disease, liver disease)   Respiratory syncytial virus (RSV) Vaccine - COVERED BY MEDICARE PART D  * RSVPreF3 (Arexvy) CDC recommends that adults 60 years of age and older may receive a single dose of RSV vaccine using shared clinical decision-making (SCDM)   Tetanus (Td) Vaccine - COST NOT COVERED BY MEDICARE PART B Following completion of primary series, a booster dose should be given every 10 years to maintain immunity against tetanus. Td may also be given as tetanus wound prophylaxis.   Tdap Vaccine - COST NOT COVERED BY MEDICARE PART B Recommended at least once for all adults. For pregnant patients, recommended with each pregnancy.   Shingles Vaccine (Shingrix) - COST NOT COVERED BY MEDICARE PART B  2 shot series recommended in those 19 years and older who have or will have weakened immune systems or those 50 years and older     Health Maintenance Due:      Topic Date Due   • Colorectal Cancer Screening  11/06/2025   • Hepatitis C Screening  Completed     Immunizations Due:      Topic Date Due   • Pneumococcal Vaccine: 65+ Years (1 of 2 - PCV) Never done   • Influenza Vaccine (1) 09/01/2023   • COVID-19 Vaccine (4 - 2023-24 season) 09/01/2023     Advance Directives   What are advance directives?  Advance directives are legal documents that state your wishes and plans for medical care. These plans are made ahead of time in case you lose your ability to make decisions for yourself. Advance directives can apply to any medical decision, such as the treatments you want, and if you want to donate organs.   What are the types of advance directives?  There are many types of advance directives, and each state has rules about how to use them. You may choose a combination of any of the following:  Living  will:  This is a written record of the treatment you want. You can also choose which treatments you do not want, which to limit, and which to stop at a certain time. This includes surgery, medicine, IV fluid, and tube feedings.   Durable power of  for healthcare (DPAHC):  This is a written record that states who you want to make healthcare choices for you when you are unable to make them for yourself. This person, called a proxy, is usually a family member or a friend. You may choose more than 1 proxy.  Do not resuscitate (DNR) order:  A DNR order is used in case your heart stops beating or you stop breathing. It is a request not to have certain forms of treatment, such as CPR. A DNR order may be included in other types of advance directives.  Medical directive:  This covers the care that you want if you are in a coma, near death, or unable to make decisions for yourself. You can list the treatments you want for each condition. Treatment may include pain medicine, surgery, blood transfusions, dialysis, IV or tube feedings, and a ventilator (breathing machine).  Values history:  This document has questions about your views, beliefs, and how you feel and think about life. This information can help others choose the care that you would choose.  Why are advance directives important?  An advance directive helps you control your care. Although spoken wishes may be used, it is better to have your wishes written down. Spoken wishes can be misunderstood, or not followed. Treatments may be given even if you do not want them. An advance directive may make it easier for your family to make difficult choices about your care.   Weight Management   Why it is important to manage your weight:  Being overweight increases your risk of health conditions such as heart disease, high blood pressure, type 2 diabetes, and certain types of cancer. It can also increase your risk for osteoarthritis, sleep apnea, and other respiratory  problems. Aim for a slow, steady weight loss. Even a small amount of weight loss can lower your risk of health problems.  How to lose weight safely:  A safe and healthy way to lose weight is to eat fewer calories and get regular exercise. You can lose up about 1 pound a week by decreasing the number of calories you eat by 500 calories each day.   Healthy meal plan for weight management:  A healthy meal plan includes a variety of foods, contains fewer calories, and helps you stay healthy. A healthy meal plan includes the following:  Eat whole-grain foods more often.  A healthy meal plan should contain fiber. Fiber is the part of grains, fruits, and vegetables that is not broken down by your body. Whole-grain foods are healthy and provide extra fiber in your diet. Some examples of whole-grain foods are whole-wheat breads and pastas, oatmeal, brown rice, and bulgur.  Eat a variety of vegetables every day.  Include dark, leafy greens such as spinach, kale, jakob greens, and mustard greens. Eat yellow and orange vegetables such as carrots, sweet potatoes, and winter squash.   Eat a variety of fruits every day.  Choose fresh or canned fruit (canned in its own juice or light syrup) instead of juice. Fruit juice has very little or no fiber.  Eat low-fat dairy foods.  Drink fat-free (skim) milk or 1% milk. Eat fat-free yogurt and low-fat cottage cheese. Try low-fat cheeses such as mozzarella and other reduced-fat cheeses.  Choose meat and other protein foods that are low in fat.  Choose beans or other legumes such as split peas or lentils. Choose fish, skinless poultry (chicken or turkey), or lean cuts of red meat (beef or pork). Before you cook meat or poultry, cut off any visible fat.   Use less fat and oil.  Try baking foods instead of frying them. Add less fat, such as margarine, sour cream, regular salad dressing and mayonnaise to foods. Eat fewer high-fat foods. Some examples of high-fat foods include french fries,  doughnuts, ice cream, and cakes.  Eat fewer sweets.  Limit foods and drinks that are high in sugar. This includes candy, cookies, regular soda, and sweetened drinks.  Exercise:  Exercise at least 30 minutes per day on most days of the week. Some examples of exercise include walking, biking, dancing, and swimming. You can also fit in more physical activity by taking the stairs instead of the elevator or parking farther away from stores. Ask your healthcare provider about the best exercise plan for you.      © Copyright [employer] 2018 Information is for End User's use only and may not be sold, redistributed or otherwise used for commercial purposes. All illustrations and images included in CareNotes® are the copyrighted property of [employer] or [employer]

## 2024-03-16 DIAGNOSIS — M17.11 PRIMARY OSTEOARTHRITIS OF RIGHT KNEE: ICD-10-CM

## 2024-03-16 DIAGNOSIS — M25.561 ACUTE PAIN OF RIGHT KNEE: ICD-10-CM

## 2024-03-18 ENCOUNTER — HOSPITAL ENCOUNTER (OUTPATIENT)
Dept: CT IMAGING | Facility: HOSPITAL | Age: 69
Discharge: HOME | End: 2024-03-18
Payer: COMMERCIAL

## 2024-03-18 DIAGNOSIS — Z13.6 SCREENING FOR CARDIOVASCULAR CONDITION: ICD-10-CM

## 2024-03-18 PROCEDURE — 75571 CT HRT W/O DYE W/CA TEST: CPT

## 2024-03-18 RX ORDER — DICLOFENAC SODIUM 75 MG/1
75 TABLET, DELAYED RELEASE ORAL 2 TIMES DAILY PRN
Qty: 60 TABLET | Refills: 5 | Status: SHIPPED | OUTPATIENT
Start: 2024-03-18

## 2024-03-21 DIAGNOSIS — E11.9 TYPE 2 DIABETES MELLITUS WITHOUT COMPLICATION, WITHOUT LONG-TERM CURRENT USE OF INSULIN (HCC): ICD-10-CM

## 2024-04-10 ENCOUNTER — OFFICE VISIT (OUTPATIENT)
Dept: OBGYN CLINIC | Facility: MEDICAL CENTER | Age: 69
End: 2024-04-10
Payer: MEDICARE

## 2024-04-10 VITALS
HEIGHT: 69 IN | DIASTOLIC BLOOD PRESSURE: 79 MMHG | WEIGHT: 267.4 LBS | BODY MASS INDEX: 39.6 KG/M2 | SYSTOLIC BLOOD PRESSURE: 139 MMHG | HEART RATE: 60 BPM

## 2024-04-10 DIAGNOSIS — M17.0 BILATERAL PRIMARY OSTEOARTHRITIS OF KNEE: Primary | ICD-10-CM

## 2024-04-10 PROCEDURE — 99213 OFFICE O/P EST LOW 20 MIN: CPT | Performed by: ORTHOPAEDIC SURGERY

## 2024-04-10 PROCEDURE — 20610 DRAIN/INJ JOINT/BURSA W/O US: CPT | Performed by: ORTHOPAEDIC SURGERY

## 2024-04-10 RX ORDER — LIDOCAINE HYDROCHLORIDE 10 MG/ML
3 INJECTION, SOLUTION INFILTRATION; PERINEURAL
Status: COMPLETED | OUTPATIENT
Start: 2024-04-10 | End: 2024-04-10

## 2024-04-10 RX ORDER — TRIAMCINOLONE ACETONIDE 40 MG/ML
40 INJECTION, SUSPENSION INTRA-ARTICULAR; INTRAMUSCULAR
Status: COMPLETED | OUTPATIENT
Start: 2024-04-10 | End: 2024-04-10

## 2024-04-10 RX ADMIN — LIDOCAINE HYDROCHLORIDE 3 ML: 10 INJECTION, SOLUTION INFILTRATION; PERINEURAL at 11:30

## 2024-04-10 RX ADMIN — TRIAMCINOLONE ACETONIDE 40 MG: 40 INJECTION, SUSPENSION INTRA-ARTICULAR; INTRAMUSCULAR at 11:30

## 2024-04-10 NOTE — PROGRESS NOTES
Assessment/Plan:  1. Bilateral primary osteoarthritis of knee      Orders Placed This Encounter   Procedures    Large joint arthrocentesis     Patient has moderate bilateral knee osteoarthritis.   After a discussion of risks and benefits the patient elected to proceed with a bilateral knee steroid injection today.  Patient should ice and avoid strenuous activity for 1-2 days if needed.  Patient should avoid vaccines for 2 weeks if possible.  If patient is diabetic should also monitor glucose over the next 7 to 10 days.  Patient receives about 4 months of relief with steroid injections.  Continue diclofenac tabs as needed for pain.   Continue home exercise program as learned at PT.   Continue short hinge knee brace as needed for comfort.       Return in about 4 months (around 8/10/2024) for Recheck, repeat Bilateral knee CSI.    I answered all of the patient's questions during the visit and provided education of the patient's condition during the visit.  The patient verbalized understanding of the information given and agrees with the plan.  This note was dictated using "[employer]" software.  It may contain errors including improperly dictated words.  Please contact physician directly for any questions.    Subjective   Chief Complaint:   Chief Complaint   Patient presents with    Left Knee - Pain     Pt states relief from cortisone injections and is seeking another dose    Right Knee - Pain       HPI  Ac Villaseñor is a 69 y.o. male who presents for follow up for moderate bilateral knee osteoarthritis.  Patient was last seen 12/13/2023 where patient received bilateral knee steroid injections.  Patient states he received about 4 months of relief with steroid injections.  Patient states his pain started to return within the past week.  Patient states he continues to take diclofenac as needed for pain control which gives him relief.  Patient has been able to work on home exercise program and recently got a [employer] membership  and will implement some of his exercises while going to the gym.  Patient does not utilize any bracing for his knees.  Patient has not tried gel injections in the past.  Patient has a history of menisectomy of bilateral knees in the past.  Patient states he is interested in repeat injections at today's visit as he has a trip to [place] on April 21.    Review of Systems  ROS:    See HPI for musculoskeletal review.   All other systems reviewed are negative     History:  Past Medical History:   Diagnosis Date    Arthritis     b/l knees    today 2/22/2022  L knee scope    Colon polyp     CPAP (continuous positive airway pressure) dependence     Diabetes mellitus (HCC)     GERD (gastroesophageal reflux disease)     Hyperlipidemia     Hypertension     Internal derangement of knee involving medial meniscus     Resolved 6/5/2015     Lyme disease     Resolved 11/11/2016     Obesity 2018    Sleep apnea      Past Surgical History:   Procedure Laterality Date    ARTHROSCOPY KNEE Left 2/22/2022    Procedure: ARTHROSCOPY KNEE PARTIAL MEDIAL MENISCECTOMY;  Surgeon: Rekha Hunt DO;  Location: [facility];  Service: Orthopedics    COLONOSCOPY  11/06/2020    All locations appeared normal, no evidence of polyps or masses, small internal hemorrhoids, 5 year recall November 2025    KNEE ARTHROSCOPY Right 1991    UPPER GASTROINTESTINAL ENDOSCOPY  11/06/2020    Normal esophagus, normal stomach (large J-shaped stomach but grossly normal), normal duodenum.  Biopsies negative for celiac disease, metaplasia, dysplasia, H pylori, positive for reactive gastropathy.     Social History   Social History     Substance and Sexual Activity   Alcohol Use Not Currently    Comment: 1 x monthly     Social History     Substance and Sexual Activity   Drug Use No     Social History     Tobacco Use   Smoking Status Never    Passive exposure: Never   Smokeless Tobacco Never     Family History:   Family History   Problem Relation Age of Onset     "Cancer Mother     Colon cancer Mother     Diabetes Father     Heart disease Father     Hypertension Father     Cancer Sister        Current Outpatient Medications on File Prior to Visit   Medication Sig Dispense Refill    amLODIPine (NORVASC) 10 mg tablet TAKE 1 TABLET BY MOUTH EVERY DAY 90 tablet 1    B Complex Vitamins (B-COMPLEX/B-12 PO) Take by mouth      celecoxib (CeleBREX) 200 mg capsule Take 1 capsule (200 mg total) by mouth daily PRN for pain 30 capsule 0    diclofenac (VOLTAREN) 75 mg EC tablet TAKE 1 TABLET BY MOUTH 2 TIMES A DAY AS NEEDED (PAIN) TAKE WITH FOOD 60 tablet 5    lisinopril (ZESTRIL) 40 mg tablet TAKE 1 TABLET BY MOUTH EVERY DAY 90 tablet 1    loratadine (CLARITIN) 10 mg tablet Take 1 tablet by mouth as needed      metFORMIN (GLUCOPHAGE) 500 mg tablet Take 1 tablet (500 mg total) by mouth 2 (two) times a day with meals 60 tablet 3    Multiple Vitamin (ONE-A-DAY MENS PO) Take by mouth in the morning      Omega-3 Fatty Acids (FISH OIL BURP-LESS PO) Take by mouth      omeprazole (PriLOSEC) 20 mg delayed release capsule Take 20 mg by mouth daily      naproxen sodium (ALEVE) 220 MG tablet  (Patient not taking: Reported on 1/24/2024)       No current facility-administered medications on file prior to visit.     Allergies   Allergen Reactions    Pollen Extract Allergic Rhinitis        Objective     /79   Pulse 60   Ht 5' 8.9\" (1.75 m)   Wt 121 kg (267 lb 6.4 oz)   BMI 39.60 kg/m²      PE:  AAOx 3  WDWN  Hearing intact, no drainage from eyes  no audible wheezing  no abdominal distension  LE compartments soft, skin intact    Ortho Exam:  bilateral Knee:   No erythema  no swelling  no effusion  no warmth  AROM: 0-115  Stable to varus/valgus stress    Large joint arthrocentesis: bilateral knee  Universal Protocol:  Consent: Verbal consent obtained.  Risks and benefits: risks, benefits and alternatives were discussed  Consent given by: patient  Patient understanding: patient states " understanding of the procedure being performed  Site marked: the operative site was marked  Patient identity confirmed: verbally with patient  Supporting Documentation  Indications: pain   Procedure Details  Location: knee - bilateral knee  Needle size: 22 G  Ultrasound guidance: no  Approach: anterolateral    Medications (Right): 40 mg triamcinolone acetonide 40 mg/mL; 3 mL lidocaine 1 %Medications (Left): 40 mg triamcinolone acetonide 40 mg/mL; 3 mL lidocaine 1 %   Patient tolerance: patient tolerated the procedure well with no immediate complications  Dressing:  Sterile dressing applied            Scribe Attestation      I,:  Austin Chacko am acting as a scribe while in the presence of the attending physician.:       I,:  Rekha Hunt DO personally performed the services described in this documentation    as scribed in my presence.:

## 2024-04-20 DIAGNOSIS — E11.9 TYPE 2 DIABETES MELLITUS WITHOUT COMPLICATION, WITHOUT LONG-TERM CURRENT USE OF INSULIN (HCC): ICD-10-CM

## 2024-05-03 ENCOUNTER — TELEPHONE (OUTPATIENT)
Dept: SLEEP CENTER | Facility: CLINIC | Age: 69
End: 2024-05-03

## 2024-05-03 ENCOUNTER — OFFICE VISIT (OUTPATIENT)
Dept: SLEEP CENTER | Facility: CLINIC | Age: 69
End: 2024-05-03
Payer: COMMERCIAL

## 2024-05-03 VITALS
DIASTOLIC BLOOD PRESSURE: 70 MMHG | HEIGHT: 68 IN | SYSTOLIC BLOOD PRESSURE: 144 MMHG | WEIGHT: 260 LBS | HEART RATE: 63 BPM | BODY MASS INDEX: 39.4 KG/M2

## 2024-05-03 DIAGNOSIS — G47.33 OSA (OBSTRUCTIVE SLEEP APNEA): Primary | ICD-10-CM

## 2024-05-03 DIAGNOSIS — E66.01 MORBID OBESITY (HCC): ICD-10-CM

## 2024-05-03 DIAGNOSIS — R68.2 DRY MOUTH: ICD-10-CM

## 2024-05-03 DIAGNOSIS — I10 PRIMARY HYPERTENSION: ICD-10-CM

## 2024-05-03 DIAGNOSIS — R09.81 NASAL CONGESTION: ICD-10-CM

## 2024-05-03 DIAGNOSIS — I10 ESSENTIAL HYPERTENSION: ICD-10-CM

## 2024-05-03 DIAGNOSIS — K21.9 GASTROESOPHAGEAL REFLUX DISEASE, UNSPECIFIED WHETHER ESOPHAGITIS PRESENT: ICD-10-CM

## 2024-05-03 DIAGNOSIS — R40.0 DAYTIME SLEEPINESS: ICD-10-CM

## 2024-05-03 PROCEDURE — 99214 OFFICE O/P EST MOD 30 MIN: CPT | Performed by: INTERNAL MEDICINE

## 2024-05-03 PROCEDURE — G2211 COMPLEX E/M VISIT ADD ON: HCPCS | Performed by: INTERNAL MEDICINE

## 2024-05-03 NOTE — PATIENT INSTRUCTIONS
Strategies to improve dry mouth were discussed. Specifically, ensuring adequate nasal patency, adjusting heat and humidity settings on PAP machine, using Biotene mouthwash &/or Xylimelt.    Nursing Support:  When: Monday through Friday 7A-5PM except holidays  Where: Our direct line is [phone].    If you are having a true emergency please call 911.  In the event that the line is busy or it is after hours please leave a voice message and we will return your call.  Please speak clearly, leaving your full name, birth date, best number to reach you and the reason for your call.   Medication refills: We will need the name of the medication, the dosage, the ordering provider, whether you get a 30 or 90 day refill, and the pharmacy name and address.  Medications will be ordered by the provider only.  Nurses cannot call in prescriptions.  Please allow 7 days for medication refills.  Physician requested updates: If your provider requested that you call with an update after starting medication, please be ready to provide us the medication and dosage, what time you take your medication, the time you attempt to fall asleep, time you fall asleep, when you wake up, and what time you get out of bed.  Sleep Study Results: We will contact you with sleep study results and/or next steps after the physician has reviewed your testing.

## 2024-05-03 NOTE — PROGRESS NOTES
Follow-Up Note - [facility]   Ac Villaseñor  69 y.o. male  :1955  MRN:[id number]  DOS:5/3/2024    CC: I saw this patient for follow-up in clinic today for Sleep disordered breathing, Coexisting Sleep and Medical Problems.. Interval changes: Patient received ot a Dream Station Version 2 machine from [employer] a few months ago..    Results of prior studies: A diagnostic study in  demonstrated an AHI of 26/h, considerably higher during REM at 68/h.  Minimum oxygen saturation was 74% and he spent 22.4% of time asleep with saturations below 90%. During a titration study in 2017, sleep disordered breathing was successfully treated with CPAP at 17 cm H2O    PFSH, Problem List, Medications & Allergies were reviewed in EMR.   He  has a past medical history of Arthritis, Colon polyp, CPAP (continuous positive airway pressure) dependence, Diabetes mellitus (HCC), GERD (gastroesophageal reflux disease), Hyperlipidemia, Hypertension, Internal derangement of knee involving medial meniscus, Lyme disease, Obesity (2018), and Sleep apnea.    He has a current medication list which includes the following prescription(s): amlodipine, b complex vitamins, celecoxib, diclofenac, lisinopril, loratadine, metformin, multiple vitamin, omega-3 fatty acids, omeprazole, and naproxen sodium.    PHYSIOLOGICAL DATA REVIEW : Device has been used 27/30 days and average on days used days around 5 hours.  Using PAP > 4 hours/night 70%. Estimated BEULAH 0.8/hour with pressure of 17cm H2O; patient has not been using non FDA approved devices to sanitize the machine.  INTERPRETATION: Compliance is good; Pressure setting is:optimal; ;   SUBJECTIVE: With respect to use of PAP, Ac  is experiencing significant adverse effects:dry mouth/throat.He derives benefit.  Is satisfied with sleep and daytime function.   Sleep Routine: Ac reports getting 7 hrs sleep; he has no difficulty initiating or maintaining sleep . He arises spontaneously  "and feels more refreshed since on Rx.Ac reports significantly improved daytime sleepiness,.  He rated himself at Total score: 4 /24 on the [place] Sleepiness Scale.   Other issues: None reported.     Habits: Reports that he has never smoked. He has never been exposed to tobacco smoke. He has never used smokeless tobacco.,  Reports that he does not currently use alcohol.,  Reports no history of drug use., Caffeine use:none; Exercise routine: regular.      ROS: Significant for around 25 pounds intentional weight reduction since his last visit.  He uses loratadine for seasonal allergies but presently controlled.  A 10 point review of systems was otherwise negative..    EXAM: /70 (BP Location: Left arm, Patient Position: Sitting, Cuff Size: Large)   Pulse 63   Ht 5' 8\" (1.727 m)   Wt 118 kg (260 lb)   BMI 39.53 kg/m²     Wt Readings from Last 3 Encounters:   05/03/24 118 kg (260 lb)   04/10/24 121 kg (267 lb 6.4 oz)   03/15/24 121 kg (266 lb 11.2 oz)      Patient is well groomed; well appearing.   CNS: Alert, orientated, speech clear & coherent  Psych: cooperative and in no distress. Mental state: Appears normal.  H&N: EOMI; NC/AT: No facial pressure marks, no rashes.    Skin/Extrem: col & hydration normal; no edema  Resp: Respiratory effort is normal  Physical findings otherwise essentially unchanged from previous.    IMPRESSION: Problem List Items & Comorbidities Addressed this Visit    1. NINI (obstructive sleep apnea)  PAP DME Pressure Change    PAP DME Resupply/Reorder      2. Daytime sleepiness        3. Essential hypertension        4. Nasal congestion        5. Dry mouth        6. Gastroesophageal reflux disease, unspecified whether esophagitis present        7. Primary hypertension        8. Morbid obesity (HCC)        1-3 improved    PLAN:  I reviewed results of prior studies and physiologic data with the patient.   I discussed treatment options with risks and benefits.  Since symptoms have " "improved, a retitration study is not warranted.  Treatment with  PAP is medically necessary and Ac is agreable to continue use.   Care of equipment, methods to improve comfort using PAP and importance of compliance with therapy were discussed.  Pressure setting: change 15-17 cmH2O.    Rx provided to replace supplies and Care coordinated with DME provider.   Strategies to improve dry mouth were discussed. Specifically, ensuring adequate nasal patency, adjusting heat and humidity settings on PAP machine, using Biotene mouthwash &/or Xylimelt.  Encouraged to persist with strategies for weight reduction.    Follow-up is advised in 1 year or sooner if needed to monitor progress, compliance and to adjust therapy.       Thank you for allowing me to participate in the care of this patient.    Sincerely,     Authenticated electronically on 05/03/24   Board Certified Specialist     Portions of the record may have been created with voice recognition software. Occasional wrong word or \"sound a like\" substitutions may have occurred due to the inherent limitations of voice recognition software. There may also be notations and random deletions of words or characters from malfunctioning software. Read the chart carefully and recognize, using context, where substitutions/deletions have occurred.    "

## 2024-05-03 NOTE — TELEPHONE ENCOUNTER
Pt was asking if he can get a script for a new machine. He got his last machine through insurance back in 2017.

## 2024-05-06 ENCOUNTER — TELEPHONE (OUTPATIENT)
Dept: SLEEP CENTER | Facility: CLINIC | Age: 69
End: 2024-05-06

## 2024-05-06 NOTE — TELEPHONE ENCOUNTER
RX for PAP pressure change and supply reorder sent to [facility] via [place].

## 2024-05-07 LAB

## 2024-05-08 LAB
DME PARACHUTE DELIVERY DATE REQUESTED: NORMAL
DME PARACHUTE ITEM DESCRIPTION: NORMAL
DME PARACHUTE ORDER STATUS: NORMAL
DME PARACHUTE SUPPLIER NAME: NORMAL
DME PARACHUTE SUPPLIER PHONE: NORMAL

## 2024-06-09 DIAGNOSIS — I10 ESSENTIAL HYPERTENSION: ICD-10-CM

## 2024-06-09 RX ORDER — AMLODIPINE BESYLATE 10 MG/1
TABLET ORAL
Qty: 90 TABLET | Refills: 1 | Status: SHIPPED | OUTPATIENT
Start: 2024-06-09

## 2024-06-09 RX ORDER — LISINOPRIL 40 MG/1
TABLET ORAL
Qty: 90 TABLET | Refills: 1 | Status: SHIPPED | OUTPATIENT
Start: 2024-06-09

## 2024-06-12 ENCOUNTER — OFFICE VISIT (OUTPATIENT)
Dept: URGENT CARE | Facility: CLINIC | Age: 69
End: 2024-06-12
Payer: COMMERCIAL

## 2024-06-12 VITALS
BODY MASS INDEX: 35.79 KG/M2 | SYSTOLIC BLOOD PRESSURE: 128 MMHG | HEIGHT: 70 IN | TEMPERATURE: 97.2 F | WEIGHT: 250 LBS | HEART RATE: 60 BPM | DIASTOLIC BLOOD PRESSURE: 70 MMHG | RESPIRATION RATE: 16 BRPM | OXYGEN SATURATION: 95 %

## 2024-06-12 DIAGNOSIS — L23.7 POISON IVY DERMATITIS: Primary | ICD-10-CM

## 2024-06-12 DIAGNOSIS — L03.113 CELLULITIS OF RIGHT UPPER EXTREMITY: ICD-10-CM

## 2024-06-12 PROCEDURE — 99213 OFFICE O/P EST LOW 20 MIN: CPT | Performed by: PHYSICIAN ASSISTANT

## 2024-06-12 RX ORDER — CEPHALEXIN 500 MG/1
500 CAPSULE ORAL EVERY 6 HOURS SCHEDULED
Qty: 20 CAPSULE | Refills: 0 | Status: SHIPPED | OUTPATIENT
Start: 2024-06-12 | End: 2024-06-17

## 2024-06-12 RX ORDER — PREDNISONE 10 MG/1
TABLET ORAL
Qty: 30 TABLET | Refills: 0 | Status: SHIPPED | OUTPATIENT
Start: 2024-06-12 | End: 2024-06-22

## 2024-06-12 NOTE — PATIENT INSTRUCTIONS
Take steroids and antibiotics as directed  Go to ER if signs of hyperglycemia develop such as increased thirst or urination, HA, blurry vision as steroids will likely elevate blood sugars  Continue to follow strict diet   Follow up with PCP in 3-5 days.  Proceed to  ER if symptoms worsen.    If tests have been performed at [facility], our office will contact you with results if changes need to be made to the care plan discussed with you at the visit.  You can review your full results on [facility] MyChart.

## 2024-06-12 NOTE — PROGRESS NOTES
[facility]        NAME: Ac Villaseñor is a 69 y.o. male  : 1955    MRN: [id number]  DATE: 2024  TIME: 8:47 AM    Assessment and Plan   Poison ivy dermatitis [L23.7]  1. Poison ivy dermatitis  predniSONE 10 mg tablet      2. Cellulitis of right upper extremity  cephalexin (KEFLEX) 500 mg capsule            Patient Instructions   Given the extensive areas of involvement the benefits of steroids outweigh the risks.  Symptoms of hyperglycemia were reviewed and he was instructed to go to ER if any of these symptoms develop as he does not have a home glucometer.  Keep the areas covered until the blisters dry up.   Take steroids and antibiotics as directed  Go to ER if signs of hyperglycemia develop such as increased thirst or urination, HA, blurry vision as steroids will likely elevate blood sugars  Continue to follow strict diet   Follow up with PCP in 3-5 days.  Proceed to  ER if symptoms worsen.    If tests have been performed at [facility], our office will contact you with results if changes need to be made to the care plan discussed with you at the visit.  [facility]    Chief Complaint     Chief Complaint   Patient presents with    Rash     Pt reports an itchy rash on his right arm that began Saturday after coming in contact with poison ivy. Began weeping on Monday.          History of Present Illness       HPI  68 y/o male presents for evaluation of poison ivy.  He states he was exposed on Thursday and started to develop a rash on his right arm on Saturday which is spreading. By Monday it had become weepy.  He states it no longer itches but burns.  He has tried cortisone cream and Tecnu spray with minimal relief.    He is diabetic and does not have a home glucometer.  Last HgA1c on 3/15/24 was 6.5.    PMH: DM Type 2, NINI, Htn, GERD  Review of Systems   Review of Systems   Constitutional:  Negative for chills and fever.   HENT:  Negative for ear  pain and sore throat.    Eyes:  Negative for pain and visual disturbance.   Respiratory:  Negative for cough and shortness of breath.    Cardiovascular:  Negative for chest pain and palpitations.   Gastrointestinal:  Negative for abdominal pain and vomiting.   Genitourinary:  Negative for dysuria and hematuria.   Musculoskeletal:  Negative for arthralgias and back pain.   Skin:  Positive for rash. Negative for color change.   Neurological:  Negative for seizures and syncope.   All other systems reviewed and are negative.        Current Medications       Current Outpatient Medications:     amLODIPine (NORVASC) 10 mg tablet, TAKE 1 TABLET BY MOUTH EVERY DAY, Disp: 90 tablet, Rfl: 1    B Complex Vitamins (B-COMPLEX/B-12 PO), Take by mouth, Disp: , Rfl:     celecoxib (CeleBREX) 200 mg capsule, Take 1 capsule (200 mg total) by mouth daily PRN for pain, Disp: 30 capsule, Rfl: 0    cephalexin (KEFLEX) 500 mg capsule, Take 1 capsule (500 mg total) by mouth every 6 (six) hours for 5 days, Disp: 20 capsule, Rfl: 0    diclofenac (VOLTAREN) 75 mg EC tablet, TAKE 1 TABLET BY MOUTH 2 TIMES A DAY AS NEEDED (PAIN) TAKE WITH FOOD, Disp: 60 tablet, Rfl: 5    lisinopril (ZESTRIL) 40 mg tablet, TAKE 1 TABLET BY MOUTH EVERY DAY, Disp: 90 tablet, Rfl: 1    loratadine (CLARITIN) 10 mg tablet, Take 1 tablet by mouth as needed, Disp: , Rfl:     metFORMIN (GLUCOPHAGE) 500 mg tablet, TAKE 1 TABLET BY MOUTH TWICE A DAY WITH MEALS, Disp: 180 tablet, Rfl: 1    Multiple Vitamin (ONE-A-DAY MENS PO), Take by mouth in the morning, Disp: , Rfl:     Omega-3 Fatty Acids (FISH OIL BURP-LESS PO), Take by mouth, Disp: , Rfl:     omeprazole (PriLOSEC) 20 mg delayed release capsule, Take 20 mg by mouth daily, Disp: , Rfl:     predniSONE 10 mg tablet, Take 5 tablets (50 mg total) by mouth daily for 2 days, THEN 4 tablets (40 mg total) daily for 2 days, THEN 3 tablets (30 mg total) daily for 2 days, THEN 2 tablets (20 mg total) daily for 2 days, THEN 1 tablet  "(10 mg total) daily for 2 days., Disp: 30 tablet, Rfl: 0    naproxen sodium (ALEVE) 220 MG tablet, , Disp: , Rfl:     Current Allergies     Allergies as of 06/12/2024 - Reviewed 06/12/2024   Allergen Reaction Noted    Pollen extract Allergic Rhinitis 09/20/2022            The following portions of the patient's history were reviewed and updated as appropriate: allergies, current medications, past family history, past medical history, past social history, past surgical history and problem list.     Past Medical History:   Diagnosis Date    Arthritis     b/l knees    today 2/22/2022  L knee scope    Colon polyp     CPAP (continuous positive airway pressure) dependence     Diabetes mellitus (HCC)     GERD (gastroesophageal reflux disease)     Hyperlipidemia     Hypertension     Internal derangement of knee involving medial meniscus     Resolved 6/5/2015     Lyme disease     Resolved 11/11/2016     Obesity 2018    Sleep apnea        Past Surgical History:   Procedure Laterality Date    ARTHROSCOPY KNEE Left 2/22/2022    Procedure: ARTHROSCOPY KNEE PARTIAL MEDIAL MENISCECTOMY;  Surgeon: Rekha Hunt DO;  Location: [facility];  Service: Orthopedics    COLONOSCOPY  11/06/2020    All locations appeared normal, no evidence of polyps or masses, small internal hemorrhoids, 5 year recall November 2025    KNEE ARTHROSCOPY Right 1991    UPPER GASTROINTESTINAL ENDOSCOPY  11/06/2020    Normal esophagus, normal stomach (large J-shaped stomach but grossly normal), normal duodenum.  Biopsies negative for celiac disease, metaplasia, dysplasia, H pylori, positive for reactive gastropathy.       Family History   Problem Relation Age of Onset    Cancer Mother     Colon cancer Mother     Diabetes Father     Heart disease Father     Hypertension Father     Cancer Sister          Medications have been verified.        Objective   /70   Pulse 60   Temp (!) 97.2 °F (36.2 °C)   Resp 16   Ht 5' 10\" (1.778 m)   Wt 113 kg (250 " lb)   SpO2 95%   BMI 35.87 kg/m²   No LMP for male patient.       Physical Exam     Physical Exam  Vitals and nursing note reviewed.   Constitutional:       Appearance: Normal appearance. He is not ill-appearing.   HENT:      Head: Normocephalic and atraumatic.      Nose: Nose normal.   Cardiovascular:      Rate and Rhythm: Normal rate and regular rhythm.      Pulses: Normal pulses.      Heart sounds: Normal heart sounds.   Pulmonary:      Effort: Pulmonary effort is normal.      Breath sounds: Normal breath sounds.   Skin:     General: Skin is warm and dry.      Comments: On the right forearm and elbow are linear streaks of erythema with weeping vesicles.  The entire right axilla is edematous with multiple weeping vesicles and erythema.    He has a few scattered spots on his abdomen and one area on his right cheek.     Neurological:      Mental Status: He is alert and oriented to person, place, and time.   Psychiatric:         Mood and Affect: Mood normal.         Behavior: Behavior normal.

## 2024-07-10 LAB
LEFT EYE DIABETIC RETINOPATHY: NORMAL
RIGHT EYE DIABETIC RETINOPATHY: NORMAL

## 2024-08-14 ENCOUNTER — OFFICE VISIT (OUTPATIENT)
Dept: OBGYN CLINIC | Facility: MEDICAL CENTER | Age: 69
End: 2024-08-14
Payer: COMMERCIAL

## 2024-08-14 VITALS
DIASTOLIC BLOOD PRESSURE: 66 MMHG | HEIGHT: 70 IN | WEIGHT: 249.1 LBS | SYSTOLIC BLOOD PRESSURE: 120 MMHG | HEART RATE: 65 BPM | BODY MASS INDEX: 35.66 KG/M2

## 2024-08-14 DIAGNOSIS — M17.0 BILATERAL PRIMARY OSTEOARTHRITIS OF KNEE: Primary | ICD-10-CM

## 2024-08-14 PROCEDURE — 99213 OFFICE O/P EST LOW 20 MIN: CPT | Performed by: ORTHOPAEDIC SURGERY

## 2024-08-14 NOTE — PROGRESS NOTES
Assessment/Plan:  1. Bilateral primary osteoarthritis of knee      No orders of the defined types were placed in this encounter.    Patient has moderate bilateral knee osteoarthritis.   Patient is receiving symptomatic relief from bilateral knee CSI administered 4/10/2024.  Continue diclofenac tabs as needed for pain. Recommend for him to take tylenol first to help with pain and then use diclofenac for break through pain.  If taking pain medication on a daily basis would recommend to return for steroid injection.    Continue home exercise program as learned at PT.   Continue short hinge knee brace as needed for comfort.       Return if symptoms worsen or fail to improve, for repeat Bilat knee CSI.    I answered all of the patient's questions during the visit and provided education of the patient's condition during the visit.  The patient verbalized understanding of the information given and agrees with the plan.  This note was dictated using [employer] software.  It may contain errors including improperly dictated words.  Please contact physician directly for any questions.    Subjective   Chief Complaint:   Chief Complaint   Patient presents with    Right Knee - Follow-up    Left Knee - Follow-up       HPI  Ac Villaseñor is a 69 y.o. male who presents for follow up for moderate bilateral knee osteoarthritis.  Patient was last seen 4/10/2024 where patient received bilateral knee CSI.  Patient states she has still been receiving symptomatic relief from steroid injections administered 4/10/2024.  Patient states he has been taking diclofenac every other day as needed for pain control which she rarely has been taking since last visit.  Patient states he has been working on HEP at home which has been beneficial for him.  Patient states he occasionally utilizes knee bracing as needed for comfort.  Patient is overall pleased with his progress.    Review of Systems  ROS:    See [facility] for musculoskeletal review.   All other  systems reviewed are negative     History:  Past Medical History:   Diagnosis Date    Arthritis     b/l knees    today 2/22/2022  L knee scope    Colon polyp     CPAP (continuous positive airway pressure) dependence     Diabetes mellitus (HCC)     GERD (gastroesophageal reflux disease)     Hyperlipidemia     Hypertension     Internal derangement of knee involving medial meniscus     Resolved 6/5/2015     Lyme disease     Resolved 11/11/2016     Obesity 2018    Sleep apnea      Past Surgical History:   Procedure Laterality Date    ARTHROSCOPY KNEE Left 2/22/2022    Procedure: ARTHROSCOPY KNEE PARTIAL MEDIAL MENISCECTOMY;  Surgeon: Rekha Hunt DO;  Location: [facility];  Service: Orthopedics    COLONOSCOPY  11/06/2020    All locations appeared normal, no evidence of polyps or masses, small internal hemorrhoids, 5 year recall November 2025    KNEE ARTHROSCOPY Right 1991    UPPER GASTROINTESTINAL ENDOSCOPY  11/06/2020    Normal esophagus, normal stomach (large J-shaped stomach but grossly normal), normal duodenum.  Biopsies negative for celiac disease, metaplasia, dysplasia, H pylori, positive for reactive gastropathy.     Social History   Social History     Substance and Sexual Activity   Alcohol Use Not Currently    Comment: 1 x monthly     Social History     Substance and Sexual Activity   Drug Use No     Social History     Tobacco Use   Smoking Status Never    Passive exposure: Never   Smokeless Tobacco Never     Family History:   Family History   Problem Relation Age of Onset    Cancer Mother     Colon cancer Mother     Diabetes Father     Heart disease Father     Hypertension Father     Cancer Sister        Current Outpatient Medications on File Prior to Visit   Medication Sig Dispense Refill    amLODIPine (NORVASC) 10 mg tablet TAKE 1 TABLET BY MOUTH EVERY DAY 90 tablet 1    B Complex Vitamins (B-COMPLEX/B-12 PO) Take by mouth      celecoxib (CeleBREX) 200 mg capsule Take 1 capsule (200 mg total) by  "mouth daily PRN for pain 30 capsule 0    diclofenac (VOLTAREN) 75 mg EC tablet TAKE 1 TABLET BY MOUTH 2 TIMES A DAY AS NEEDED (PAIN) TAKE WITH FOOD 60 tablet 5    lisinopril (ZESTRIL) 40 mg tablet TAKE 1 TABLET BY MOUTH EVERY DAY 90 tablet 1    loratadine (CLARITIN) 10 mg tablet Take 1 tablet by mouth as needed      metFORMIN (GLUCOPHAGE) 500 mg tablet TAKE 1 TABLET BY MOUTH TWICE A DAY WITH MEALS 180 tablet 1    Multiple Vitamin (ONE-A-DAY MENS PO) Take by mouth in the morning      naproxen sodium (ALEVE) 220 MG tablet  (Patient not taking: Reported on 1/24/2024)      neomycin-polymyxin-hydrocortisone (CORTISPORIN) otic solution Administer 4 drops to the right ear every 6 (six) hours for 10 days 10 mL 0    Omega-3 Fatty Acids (FISH OIL BURP-LESS PO) Take by mouth      omeprazole (PriLOSEC) 20 mg delayed release capsule Take 20 mg by mouth daily       No current facility-administered medications on file prior to visit.     Allergies   Allergen Reactions    Pollen Extract Allergic Rhinitis        Objective     /66   Pulse 65   Ht 5' 10\" (1.778 m)   Wt 113 kg (249 lb 1.6 oz)   BMI 35.74 kg/m²      PE:  AAOx 3  WDWN  Hearing intact, no drainage from eyes  no audible wheezing  no abdominal distension  LE compartments soft, skin intact    Ortho Exam:  bilateral Knee:   No erythema  no swelling  no effusion  no warmth  AROM: 0-115  Stable to varus/valgus stress      Scribe Attestation      I,:  Austin Chacko am acting as a scribe while in the presence of the attending physician.:       I,:  Rekha Hunt DO personally performed the services described in this documentation    as scribed in my presence.:                    "

## 2024-08-23 ENCOUNTER — VBI (OUTPATIENT)
Dept: ADMINISTRATIVE | Facility: OTHER | Age: 69
End: 2024-08-23

## 2024-08-23 NOTE — TELEPHONE ENCOUNTER
08/23/24 9:50 AM     Chart reviewed for Hemoglobin A1c ; nothing is submitted to the patient's insurance at this time.     Dariana Coleman MA   [facility]

## 2024-09-14 DIAGNOSIS — M17.11 PRIMARY OSTEOARTHRITIS OF RIGHT KNEE: ICD-10-CM

## 2024-09-14 DIAGNOSIS — M25.561 ACUTE PAIN OF RIGHT KNEE: ICD-10-CM

## 2024-09-16 RX ORDER — DICLOFENAC SODIUM 75 MG/1
75 TABLET, DELAYED RELEASE ORAL 2 TIMES DAILY PRN
Qty: 180 TABLET | Refills: 1 | Status: SHIPPED | OUTPATIENT
Start: 2024-09-16

## 2024-09-18 ENCOUNTER — APPOINTMENT (OUTPATIENT)
Dept: RADIOLOGY | Facility: CLINIC | Age: 69
End: 2024-09-18
Payer: COMMERCIAL

## 2024-09-18 ENCOUNTER — OFFICE VISIT (OUTPATIENT)
Dept: FAMILY MEDICINE CLINIC | Facility: CLINIC | Age: 69
End: 2024-09-18
Payer: COMMERCIAL

## 2024-09-18 VITALS
WEIGHT: 247.8 LBS | RESPIRATION RATE: 17 BRPM | OXYGEN SATURATION: 95 % | HEIGHT: 70 IN | DIASTOLIC BLOOD PRESSURE: 80 MMHG | SYSTOLIC BLOOD PRESSURE: 120 MMHG | TEMPERATURE: 97.8 F | BODY MASS INDEX: 35.48 KG/M2 | HEART RATE: 62 BPM

## 2024-09-18 DIAGNOSIS — E11.9 TYPE 2 DIABETES MELLITUS WITHOUT COMPLICATION, WITHOUT LONG-TERM CURRENT USE OF INSULIN (HCC): Primary | ICD-10-CM

## 2024-09-18 DIAGNOSIS — E11.8 TYPE 2 DIABETES MELLITUS WITH UNSPECIFIED COMPLICATIONS (HCC): ICD-10-CM

## 2024-09-18 DIAGNOSIS — M54.50 LUMBAR SPINE PAIN: ICD-10-CM

## 2024-09-18 DIAGNOSIS — I10 PRIMARY HYPERTENSION: ICD-10-CM

## 2024-09-18 LAB — SL AMB POCT HEMOGLOBIN AIC: 5.9 (ref ?–6.5)

## 2024-09-18 PROCEDURE — 99214 OFFICE O/P EST MOD 30 MIN: CPT | Performed by: FAMILY MEDICINE

## 2024-09-18 PROCEDURE — 83036 HEMOGLOBIN GLYCOSYLATED A1C: CPT | Performed by: FAMILY MEDICINE

## 2024-09-18 PROCEDURE — 72110 X-RAY EXAM L-2 SPINE 4/>VWS: CPT

## 2024-09-18 NOTE — ASSESSMENT & PLAN NOTE
Lab Results   Component Value Date    HGBA1C 5.9 09/18/2024       Orders:    POCT hemoglobin A1c

## 2024-09-18 NOTE — PROGRESS NOTES
"Ambulatory Visit  Name: Ac Villaseñor      : 1955      MRN: [id number]  Encounter Provider: Hi Sauceda DO  Encounter Date: 2024   Encounter department: [facility]    Assessment & Plan  Type 2 diabetes mellitus without complication, without long-term current use of insulin (HCC)    Lab Results   Component Value Date    HGBA1C 5.9 2024       Orders:    POCT hemoglobin A1c    Type 2 diabetes mellitus with unspecified complications (HCC)    Lab Results   Component Value Date    HGBA1C 5.9 2024       Orders:    POCT hemoglobin A1c    Primary hypertension         Lumbar spine pain    Orders:    XR spine lumbar minimum 4 views non injury; Future    Ambulatory Referral to Physical Therapy; Future    X-ray of lumbar spine ordered  Patient for to physical therapy  Patient sugars are well-controlled  Due to his recent weight loss may need to cut back on medications in the near future  Can follow-up in 6 months or sooner if needed              Depression Screening and Follow-up Plan: Patient was screened for depression during today's encounter. They screened negative with a PHQ-2 score of 0.      History of Present Illness     Patient presents today for 6-month checkup  He is lost 20 pounds  His sugars are great at 5.9  He has no acute complaints today          Review of Systems   Constitutional: Negative.    HENT: Negative.     Eyes: Negative.    Respiratory: Negative.     Cardiovascular: Negative.    Gastrointestinal: Negative.    Endocrine: Negative.    Genitourinary: Negative.    Musculoskeletal: Negative.    Skin: Negative.    Allergic/Immunologic: Negative.    Neurological: Negative.    Hematological: Negative.    Psychiatric/Behavioral: Negative.     All other systems reviewed and are negative.          Objective     /80 (BP Location: Left arm, Patient Position: Sitting, Cuff Size: Large)   Pulse 62   Temp 97.8 °F (36.6 °C) (Tympanic)   Resp 17   Ht 5' 10\" " (1.778 m)   Wt 112 kg (247 lb 12.8 oz)   SpO2 95%   BMI 35.56 kg/m²     Physical Exam  Vitals and nursing note reviewed.   Constitutional:       Appearance: Normal appearance. He is well-developed.   HENT:      Head: Normocephalic.      Right Ear: External ear normal.      Left Ear: External ear normal.      Nose: Nose normal.   Eyes:      Conjunctiva/sclera: Conjunctivae normal.      Pupils: Pupils are equal, round, and reactive to light.   Cardiovascular:      Rate and Rhythm: Normal rate and regular rhythm.      Heart sounds: Normal heart sounds.   Pulmonary:      Effort: Pulmonary effort is normal.      Breath sounds: Normal breath sounds.   Abdominal:      General: Bowel sounds are normal.      Palpations: Abdomen is soft.   Musculoskeletal:         General: Normal range of motion.      Cervical back: Normal range of motion and neck supple.   Skin:     General: Skin is warm and dry.   Neurological:      General: No focal deficit present.      Mental Status: He is alert and oriented to person, place, and time.   Psychiatric:         Behavior: Behavior normal.         Thought Content: Thought content normal.         Judgment: Judgment normal.

## 2024-09-19 DIAGNOSIS — M17.0 BILATERAL PRIMARY OSTEOARTHRITIS OF KNEE: Primary | ICD-10-CM

## 2024-09-26 ENCOUNTER — TELEPHONE (OUTPATIENT)
Dept: DERMATOLOGY | Facility: CLINIC | Age: 69
End: 2024-09-26

## 2024-09-26 NOTE — TELEPHONE ENCOUNTER
Called patient to advise his appt with Dr. Benton on 11/25 needs to be r/s, as provider is leaving the practice. Spoke with patient and appt has been rescheduled for 12/9 @ 1:40 with Dr. Carter in [facility] office.

## 2024-10-02 ENCOUNTER — EVALUATION (OUTPATIENT)
Dept: PHYSICAL THERAPY | Facility: CLINIC | Age: 69
End: 2024-10-02
Payer: COMMERCIAL

## 2024-10-02 DIAGNOSIS — M54.50 LUMBAR SPINE PAIN: Primary | ICD-10-CM

## 2024-10-02 PROCEDURE — 97112 NEUROMUSCULAR REEDUCATION: CPT | Performed by: PHYSICAL THERAPIST

## 2024-10-02 PROCEDURE — 97161 PT EVAL LOW COMPLEX 20 MIN: CPT | Performed by: PHYSICAL THERAPIST

## 2024-10-02 NOTE — PROGRESS NOTES
PT Evaluation     Today's date: 10/2/2024  Patient name: Ac Villaseñor  : 1955  MRN: [id number]  Referring provider: Hi Sauceda DO  Dx:   Encounter Diagnosis     ICD-10-CM    1. Lumbar spine pain  M54.50 Ambulatory Referral to Physical Therapy                     Assessment  Impairments: abnormal muscle firing, abnormal or restricted ROM, activity intolerance, impaired physical strength, lacks appropriate home exercise program and pain with function  Symptom irritability: moderate    Assessment details: Ac Villaseñor is a 69 y.o. male who presents with pain, decreased strength, decreased ROM, and decreased joint mobility. Due to these impairments, patient has difficulty performing a/iadls, recreational activities, work-related activities, and engaging in social activities. Patient's clinical presentation is consistent with their referring diagnosis of Lumbar spine pain. Patient has been educated in home exercise program and plan of care. Patient would benefit from skilled physical therapy services to address their aforementioned functional limitations and progress towards prior level of function and independence with home exercise program.   Understanding of Dx/Px/POC: good     Prognosis: good    Goals  Short Term Goals:  Target Date 4 weeks  1. Pt will initiate and advance HEP.  2. Pt will have < 3/10 pain  3. Pt will have full arom of the l/s  4. Pt will be able to wake with out difficulty or pain    Long Term Goals:  Target Date 8 weeks  1. Pt will demonstrate independence in HEP.  2. Pt will have <1/10 pain  3. Pt will have full core and B LE strength  4. Pt will be able to golf 18 holes with out difficulty        Plan  Patient would benefit from: skilled PT  Planned modality interventions: cryotherapy, electrical stimulation/Russian stimulation and thermotherapy: hydrocollator packs    Planned therapy interventions: joint mobilization, manual therapy, patient education, postural training, activity  modification, abdominal trunk stabilization, body mechanics training, flexibility, functional ROM exercises, graded exercise, home exercise program, neuromuscular re-education, strengthening, stretching, therapeutic activities, therapeutic exercise, motor coordination training, muscle pump exercises, gait training, balance/weight bearing training, ADL training and breathing training    Frequency: 3x week  Duration in weeks: 8  Treatment plan discussed with: patient        Subjective Evaluation    History of Present Illness  Mechanism of injury: Pt notes onset of pain 6 months ago [facility] issues. After increase in activity he started to have back pain, but would do stretches and improve. Then progressed to stretches not helping. Tried to do some previous core exercises but couldn't get legs up off the floor from a supine position. He has lost 40lbs since the new year, but still back pain. Pain is only in the back with out radiating pain.   Patient Goals  Patient goals for therapy: decreased pain, increased motion, independence with ADLs/IADLs, increased strength and return to sport/leisure activities    Pain  Current pain ratin  At best pain ratin  At worst pain ratin  Location: l/s  Quality: tight          Objective     Concurrent Complaints  Negative for night pain, disturbed sleep, bladder dysfunction, bowel dysfunction, saddle (S4) numbness, history of cancer, history of trauma and infection    Static Posture   General Observations  Guarded.     Active Range of Motion     Lumbar   Flexion:  Restriction level: minimal  Extension:  with pain Restriction level: moderate  Left lateral flexion:  Restriction level: moderate  Right lateral flexion:  Restriction level: minimal  Left rotation:  WFL  Right rotation:  WFL    Joint Play     Hypomobile: T10, T11, T12, L1, L2 and L3     Strength/Myotome Testing     Left Hip   Planes of Motion   Flexion: 4    Right Hip   Planes  of Motion   Flexion: 3+    Left Knee   Flexion: 4  Extension: 5    Right Knee   Flexion: 4  Extension: 5    Muscle Activation   Patient able to activate left transverse abdominals, left external obliques, left internal obliques, right transverse abdominals, right external obliques and right internal obliques.     Tests     Lumbar   Positive prone instability .     Ambulation     Observational Gait   Gait: within functional limits              Precautions: none      Manuals 10/2            L/s pa and rot mobs nv                                                   Neuro Re-Ed             ppt 5''x10            Ppt slr x10            Ppt bridge 2x10            Pallof press nv            Seated chop nv            Ppt bkfo nv                         Ther Ex             bike 5' nv                                                                                                       Ther Activity                                       Gait Training                                       Modalities

## 2024-10-07 ENCOUNTER — VBI (OUTPATIENT)
Dept: ADMINISTRATIVE | Facility: OTHER | Age: 69
End: 2024-10-07

## 2024-10-07 NOTE — TELEPHONE ENCOUNTER
10/07/24 8:32 AM     Chart reviewed for Hemoglobin A1c was/were submitted to the patient's insurance.     Dariana Coleman MA   [facility]

## 2024-10-09 ENCOUNTER — OFFICE VISIT (OUTPATIENT)
Dept: PHYSICAL THERAPY | Facility: CLINIC | Age: 69
End: 2024-10-09
Payer: COMMERCIAL

## 2024-10-09 DIAGNOSIS — M54.50 LUMBAR SPINE PAIN: Primary | ICD-10-CM

## 2024-10-09 PROCEDURE — 97112 NEUROMUSCULAR REEDUCATION: CPT

## 2024-10-09 PROCEDURE — 97110 THERAPEUTIC EXERCISES: CPT

## 2024-10-09 NOTE — PROGRESS NOTES
"Daily Note     Today's date: 10/9/2024  Patient name: Ac Villaseñor  : 1955  MRN: [id number]  Referring provider: Hi Sauceda DO  Dx:   Encounter Diagnosis     ICD-10-CM    1. Lumbar spine pain  M54.50                      Subjective: pt is with no new c/o  states that he stretches every morning before he gets OOB      Objective: See treatment diary below      Assessment: Tolerated treatment with good form noted with exercises, some cues to breathe during reps. . Patient demonstrated fatigue post treatment and would benefit from continued PT  Pt felt better post treatment session.       Plan: Continue per plan of care.      Precautions: none      Manuals 10/2 10/9           L/s pa and rot mobs nv nv                                                  Neuro Re-Ed             ppt 5''x10 5\" 2x10           Ppt slr x10 2x10           Ppt bridge 2x10 2x10           Pallof press nv            Seated chop nv            Ppt bkfo nv 5\"x20                        Ther Ex             Bike for strength and endurance 5' nv Lv1 6'                                                                                                      Ther Activity                                       Gait Training                                       Modalities                                            "

## 2024-10-16 ENCOUNTER — OFFICE VISIT (OUTPATIENT)
Dept: PHYSICAL THERAPY | Facility: CLINIC | Age: 69
End: 2024-10-16
Payer: COMMERCIAL

## 2024-10-16 DIAGNOSIS — M54.50 LUMBAR SPINE PAIN: Primary | ICD-10-CM

## 2024-10-16 PROCEDURE — 97112 NEUROMUSCULAR REEDUCATION: CPT

## 2024-10-16 PROCEDURE — 97110 THERAPEUTIC EXERCISES: CPT

## 2024-10-16 NOTE — PROGRESS NOTES
"Daily Note     Today's date: 10/16/2024  Patient name: Ac Villaseñor  : 1955  MRN: [id number]  Referring provider: Hi Sauceda DO  Dx:   Encounter Diagnosis     ICD-10-CM    1. Lumbar spine pain  M54.50                      Subjective: pt is without new c/o       Objective: See treatment diary below      Assessment: Pt had some cramping in calves after bridges.  . Patient exhibited good technique with therapeutic exercises and would benefit from continued PT addition of mobs performed by DB PT to help with movement.       Plan: Continue per plan of care.      Precautions: none      Manuals 10/2 10/9 10/15          L/s pa and rot mobs nv nv DB                                                 Neuro Re-Ed             ppt 5''x10 5\" 2x10 5\" x20          Ppt slr x10 2x10 x10          Ppt bridge 2x10 2x10 2x10          Pallof press nv  Gtb 2x10          Seated chop nv  Gtb 2x10          Ppt bkfo nv 5\"x20 5\" [id number]                       Ther Ex             Bike for strength and endurance 5' nv Lv1 6' Lv1 6'          [place] and arrow   5\"x10                                                                                        Ther Activity                                       Gait Training                                       Modalities                                              "

## 2024-10-23 ENCOUNTER — OFFICE VISIT (OUTPATIENT)
Dept: PHYSICAL THERAPY | Facility: CLINIC | Age: 69
End: 2024-10-23
Payer: COMMERCIAL

## 2024-10-23 DIAGNOSIS — M54.50 LUMBAR SPINE PAIN: Primary | ICD-10-CM

## 2024-10-23 PROCEDURE — 97140 MANUAL THERAPY 1/> REGIONS: CPT | Performed by: PHYSICAL THERAPIST

## 2024-10-23 PROCEDURE — 97112 NEUROMUSCULAR REEDUCATION: CPT | Performed by: PHYSICAL THERAPIST

## 2024-10-23 PROCEDURE — 97110 THERAPEUTIC EXERCISES: CPT | Performed by: PHYSICAL THERAPIST

## 2024-10-23 NOTE — PROGRESS NOTES
"Daily Note     Today's date: 10/23/2024  Patient name: Ac Villaseñor  : 1955  MRN: [id number]  Referring provider: Hi Sauceda DO  Dx:   Encounter Diagnosis     ICD-10-CM    1. Lumbar spine pain  M54.50                      Subjective: pt notes that he is still stiff in the am. He notes that the mobs on his back did make his back feel better for a day or so though.      Objective: See treatment diary below      Assessment: advised tp that that best way to confront the morning stiffness is to work on skc, ppt, and bow and arrow stretch first thing and to work on progressively stretching farther. Did continue to work on rotational strength as pt loses core stability with rolling from s/l to supine secondary to the torso rotation    Plan: Continue per plan of care.      Precautions: none      Manuals 10/2 10/9 10/15 10/23         L/s pa and rot mobs nv nv DB DB                                                Neuro Re-Ed             ppt 5''x10 5\" 2x10 5\" x20          Ppt slr x10 2x10 x10          Ppt bridge 2x10 2x10 2x10 2x10         Pallof press nv  Gtb 2x10          Seated chop nv  Gtb 2x10 Blue 2x10         Ppt bkfo nv 5\"x20 5\" [id number]          Rev chop    Blue 2x10         Ther Ex             Bike for strength and endurance 5' nv Lv1 6' Lv1 6' Lv 1 10'         [place] and arrow   5\"x10 5''x10                                                                                       Ther Activity                                       Gait Training                                       Modalities                                                "

## 2024-10-30 ENCOUNTER — OFFICE VISIT (OUTPATIENT)
Dept: PHYSICAL THERAPY | Facility: CLINIC | Age: 69
End: 2024-10-30
Payer: COMMERCIAL

## 2024-10-30 DIAGNOSIS — M54.50 LUMBAR SPINE PAIN: Primary | ICD-10-CM

## 2024-10-30 PROCEDURE — 97112 NEUROMUSCULAR REEDUCATION: CPT

## 2024-10-30 PROCEDURE — 97110 THERAPEUTIC EXERCISES: CPT

## 2024-10-30 PROCEDURE — 97140 MANUAL THERAPY 1/> REGIONS: CPT

## 2024-10-30 NOTE — PROGRESS NOTES
"Daily Note     Today's date: 10/30/2024  Patient name: Ac Villaseñor  : 1955  MRN: [id number]  Referring provider: Hi Sauceda DO  Dx:   Encounter Diagnosis     ICD-10-CM    1. Lumbar spine pain  M54.50                      Subjective: pt states that he is trying the stretches before getting OOB       Objective: See treatment diary below      Assessment: Tolerated treatment with TTP over QL on left side. Patient demonstrated fatigue post treatment, exhibited good technique with therapeutic exercises, and would benefit from continued PT      Plan: Continue per plan of care.      Precautions: none      Manuals 10/2 10/9 10/15 10/23 10/30        L/s pa and rot mobs nv nv DB DB         TPR     DL                                  Neuro Re-Ed             ppt 5''x10 5\" 2x10 5\" x20  5\"x20        Ppt slr x10 2x10 x10          Ppt bridge 2x10 2x10 2x10 2x10 2x10        Pallof press nv  Gtb 2x10  Blue 2x10        Seated chop nv  Gtb 2x10 Blue 2x10 Blue 2x10        Ppt bkfo nv 5\"x20 5\" [id number]          Rev chop    Blue 2x10 Blue 2x10        Ther Ex             Bike for strength and endurance 5' nv Lv1 6' Lv1 6' Lv 1 10' Lv1 10'        [place] and arrow   5\"x10 5''x10 5\"x20                                                                                      Ther Activity                                       Gait Training                                       Modalities                                                  "

## 2024-11-06 ENCOUNTER — APPOINTMENT (OUTPATIENT)
Dept: PHYSICAL THERAPY | Facility: CLINIC | Age: 69
End: 2024-11-06
Payer: COMMERCIAL

## 2024-11-08 DIAGNOSIS — E11.9 TYPE 2 DIABETES MELLITUS WITHOUT COMPLICATION, WITHOUT LONG-TERM CURRENT USE OF INSULIN (HCC): ICD-10-CM

## 2024-11-14 ENCOUNTER — OFFICE VISIT (OUTPATIENT)
Dept: PHYSICAL THERAPY | Facility: CLINIC | Age: 69
End: 2024-11-14
Payer: COMMERCIAL

## 2024-11-14 DIAGNOSIS — M54.50 LUMBAR SPINE PAIN: Primary | ICD-10-CM

## 2024-11-14 PROCEDURE — 97140 MANUAL THERAPY 1/> REGIONS: CPT | Performed by: PHYSICAL THERAPIST

## 2024-11-14 PROCEDURE — 97112 NEUROMUSCULAR REEDUCATION: CPT | Performed by: PHYSICAL THERAPIST

## 2024-11-14 NOTE — PROGRESS NOTES
"Daily Note     Today's date: 2024  Patient name: Ac Villaseñor  : 1955  MRN: [id number]  Referring provider: Hi Sauceda DO  Dx:   Encounter Diagnosis     ICD-10-CM    1. Lumbar spine pain  M54.50                      Subjective: pt notes that his back isnt much different yet, but also notes that he has only been doing 3 exercises in the am at home. And no strengthening only stretches        Objective: See treatment diary below      Assessment: Pt advised that he will not see any change in his back pain with only doing 3 exercises per day. Did increase pt's load to 5 exerdcises by adding supine clams and bridges with band to hep    Plan: Continue per plan of care.      Precautions: none      Manuals 10/2 10/9 10/15 10/23 10/30 11/14       L/s pa and rot mobs nv nv DB DB         TPR     DL                                  Neuro Re-Ed             ppt 5''x10 5\" 2x10 5\" x20  5\"x20 5''x20       Ppt slr x10 2x10 x10   S/l hip abd  [id number]        Ppt bridge 2x10 2x10 2x10 2x10 2x10  Blue [id number]       Pallof press nv  Gtb 2x10  Blue 2x10        Seated chop nv  Gtb 2x10 Blue 2x10 Blue 2x10        Supine hip abd      Blue 2x10       Rev chop    Blue 2x10 Blue 2x10        Ther Ex             Bike for strength and endurance 5' nv Lv1 6' Lv1 6' Lv 1 10' Lv1 10' Lv 2 10'       [place] and arrow   5\"x10 5''x10 5\"x20        Piriformis stretch      20''x5                                                                        Ther Activity                                       Gait Training                                       Modalities                                                    "

## 2024-11-15 ENCOUNTER — PROCEDURE VISIT (OUTPATIENT)
Dept: OBGYN CLINIC | Facility: MEDICAL CENTER | Age: 69
End: 2024-11-15
Payer: COMMERCIAL

## 2024-11-15 VITALS
BODY MASS INDEX: 35.33 KG/M2 | DIASTOLIC BLOOD PRESSURE: 77 MMHG | WEIGHT: 246.8 LBS | SYSTOLIC BLOOD PRESSURE: 129 MMHG | HEIGHT: 70 IN | HEART RATE: 58 BPM

## 2024-11-15 DIAGNOSIS — M25.561 CHRONIC PAIN OF BOTH KNEES: ICD-10-CM

## 2024-11-15 DIAGNOSIS — M17.0 BILATERAL PRIMARY OSTEOARTHRITIS OF KNEE: Primary | ICD-10-CM

## 2024-11-15 DIAGNOSIS — G89.29 CHRONIC PAIN OF BOTH KNEES: ICD-10-CM

## 2024-11-15 DIAGNOSIS — M25.562 CHRONIC PAIN OF BOTH KNEES: ICD-10-CM

## 2024-11-15 PROCEDURE — 20610 DRAIN/INJ JOINT/BURSA W/O US: CPT | Performed by: PHYSICIAN ASSISTANT

## 2024-11-15 NOTE — PROGRESS NOTES
Patient has moderate bilateral knee osteoarthritis.   Pain rated 3-4/10 today.   Patient received bilateral knee Durolane injections today.   Tolerated the procedures well.   Post injection instructions reviewed.   Follow up 2 months for CSI if needed.       Large joint arthrocentesis: bilateral knee  Universal Protocol:  procedure performed by consultantConsent: Verbal consent obtained.  Risks and benefits: risks, benefits and alternatives were discussed  Consent given by: patient  Site marked: the operative site was marked  Supporting Documentation  Indications: pain   Procedure Details  Location: knee - bilateral knee  Preparation: Patient was prepped and draped in the usual sterile fashion  Needle size: 22 G  Ultrasound guidance: no  Approach: anterolateral    Medications (Right): 3 mL sodium hyaluronate 60 MG/3MLMedications (Left): 3 mL sodium hyaluronate 60 MG/3ML   Patient tolerance: patient tolerated the procedure well with no immediate complications  Dressing:  Sterile dressing applied

## 2024-11-20 ENCOUNTER — APPOINTMENT (OUTPATIENT)
Dept: PHYSICAL THERAPY | Facility: CLINIC | Age: 69
End: 2024-11-20
Payer: COMMERCIAL

## 2024-11-27 ENCOUNTER — EVALUATION (OUTPATIENT)
Dept: PHYSICAL THERAPY | Facility: CLINIC | Age: 69
End: 2024-11-27
Payer: COMMERCIAL

## 2024-11-27 DIAGNOSIS — M54.50 LUMBAR SPINE PAIN: Primary | ICD-10-CM

## 2024-11-27 PROCEDURE — 97112 NEUROMUSCULAR REEDUCATION: CPT | Performed by: PHYSICAL THERAPIST

## 2024-11-27 PROCEDURE — 97140 MANUAL THERAPY 1/> REGIONS: CPT | Performed by: PHYSICAL THERAPIST

## 2024-11-27 NOTE — PROGRESS NOTES
PT Evaluation     Today's date: 2024  Patient name: Ac Villaseñor  : 1955  MRN: [id number]  Referring provider: Hi Sauceda DO  Dx:   Encounter Diagnosis     ICD-10-CM    1. Lumbar spine pain  M54.50             Start Time: 1015  Stop Time: 1100  Total time in clinic (min): 45 minutes    Assessment  Impairments: abnormal muscle firing, abnormal or restricted ROM, activity intolerance, impaired physical strength, lacks appropriate home exercise program and pain with function  Symptom irritability: low    Assessment details: Ac Villaseñor has been compliant with attending PT and home exercise program since initial eval.  Ac  has made improvements in objective data since initial eval but is still limited compared to prior level of function. Ac continues with above listed impairments and would benefit from additional skilled PT to address these deficits to return to prior level of function.    Understanding of Dx/Px/POC: good     Prognosis: good    Goals  Short Term Goals:  Target Date 4 weeks  1. Pt will initiate and advance HEP.  2. Pt will have < 3/10 pain met  3. Pt will have full arom of the l/s met  4. Pt will be able to wake with out difficulty or pain    Long Term Goals:  Target Date 8 weeks  1. Pt will demonstrate independence in HEP.  2. Pt will have <1/10 pain  3. Pt will have full core and B LE strength not met  4. Pt will be able to golf 18 holes with out difficulty met        Plan  Patient would benefit from: skilled PT  Planned modality interventions: cryotherapy and thermotherapy: hydrocollator packs    Planned therapy interventions: joint mobilization, manual therapy, patient education, postural training, activity modification, abdominal trunk stabilization, body mechanics training, flexibility, functional ROM exercises, home exercise program, neuromuscular re-education, strengthening, stretching, therapeutic activities, therapeutic exercise, motor coordination training,  muscle pump exercises, gait training, balance/weight bearing training and ADL training    Frequency: 1x week  Duration in weeks: 8  Plan of Care beginning date: 2024  Plan of Care expiration date: 2025  Treatment plan discussed with: patient        Subjective Evaluation    History of Present Illness  Mechanism of injury: Pt notes that overall his symptoms have improved. He is able to golf a round with out increased symptoms. He does still wake with pain in the am, and this is is his remaining symptoms.   Patient Goals  Patient goals for therapy: decreased pain, independence with ADLs/IADLs, increased strength and return to sport/leisure activities    Pain  Current pain ratin  At best pain ratin  At worst pain ratin  Location: l/s  Quality: tight          Objective     Concurrent Complaints  Negative for night pain, disturbed sleep, bladder dysfunction, bowel dysfunction, saddle (S4) numbness, history of cancer, history of trauma and infection    Active Range of Motion     Lumbar   Flexion:  WFL  Extension:  Restriction level: minimal  Left lateral flexion:  Restriction level: minimal  Right lateral flexion:  Restriction level: minimal  Left rotation:  WFL  Right rotation:  WFL    Joint Play     Hypomobile: T12, L1, L2, L3, L4 and L5     Strength/Myotome Testing     Left Hip   Planes of Motion   Flexion: 4  Abduction: 4-    Right Hip   Planes of Motion   Flexion: 4  Abduction: 4-    Left Knee   Flexion: 4+  Extension: 5    Right Knee   Flexion: 4+  Extension: 5    Muscle Activation   Patient able to activate left external obliques, left internal obliques, right external obliques and right internal obliques.   Patient unable to activate left transverse abdominals and right transverse abdominals.     Tests     Lumbar   Negative prone instability .     Ambulation     Observational Gait   Gait: within functional limits         Manuals 10/2 10/9 10/15 10/23 10/30 11/14 11/27      L/s pa and [place]  "nv nv DB DB   DB      TPR     DL        Traction supine hooklying       DB                   Neuro Re-Ed             ppt 5''x10 5\" 2x10 5\" x20  5\"x20 5''x20 5''x20      Ppt slr x10 2x10 x10   S/l hip abd  [id number]  S/l hip abd  [id number]       Ppt bridge 2x10 2x10 2x10 2x10 2x10  Blue [id number] Blue 2x10 ball 2x10      Pallof press nv  Gtb 2x10  Blue 2x10        Seated chop nv  Gtb 2x10 Blue 2x10 Blue 2x10        Supine hip abd      Blue 2x10       Rev chop    Blue 2x10 Blue 2x10        Ther Ex             Bike for strength and endurance 5' nv Lv1 6' Lv1 6' Lv 1 10' Lv1 10' Lv 2 10' Lv 2 10'      [place] and arrow   5\"x10 5''x10 5\"x20        Piriformis stretch      20''x5                                                     Precautions: none  "

## 2024-12-04 ENCOUNTER — OFFICE VISIT (OUTPATIENT)
Dept: PHYSICAL THERAPY | Facility: CLINIC | Age: 69
End: 2024-12-04
Payer: COMMERCIAL

## 2024-12-04 DIAGNOSIS — M54.50 LUMBAR SPINE PAIN: Primary | ICD-10-CM

## 2024-12-04 PROCEDURE — 97112 NEUROMUSCULAR REEDUCATION: CPT

## 2024-12-04 PROCEDURE — 97110 THERAPEUTIC EXERCISES: CPT

## 2024-12-04 PROCEDURE — 97140 MANUAL THERAPY 1/> REGIONS: CPT

## 2024-12-04 NOTE — PROGRESS NOTES
"Daily Note     Today's date: 2024  Patient name: Ac Villaseñor  : 1955  MRN: [id number]  Referring provider: Hi Sauceda DO  Dx:   Encounter Diagnosis     ICD-10-CM    1. Lumbar spine pain  M54.50                      Subjective: pt states that he is very sore and stiff this morning.  Did play golf Saturday and Monday.        Objective: See treatment diary below      Assessment: Pt was with increased spasms in L>R QL and gluteals that did loosen with manuals.  Modification in PT program 2* to increased tissue irritability. Patient exhibited good technique with therapeutic exercises      Plan: Continue per plan of care.      Precautions: none    Manuals 10/2 10/9 10/15 10/23 10/30 11/14 11/27 12/4     L/s pa and rot mobs nv nv DB DB   DB      TPR     DL   DL-B ql and gluteals     Traction supine hooklying       DB                   Neuro Re-Ed             ppt 5''x10 5\" 2x10 5\" x20  5\"x20 5''x20 5''x20 5\"x20     Ppt slr x10 2x10 x10   S/l hip abd  [id number]  S/l hip abd  [id number]       Ppt bridge 2x10 2x10 2x10 2x10 2x10  Blue [id number] Blue 2x10 ball 2x10 Blue 2x10     Pallof press nv  Gtb 2x10  Blue 2x10        Seated chop nv  Gtb 2x10 Blue 2x10 Blue 2x10        Supine hip abd      Blue 2x10       Rev chop    Blue 2x10 Blue 2x10        Ther Ex             Bike for strength and endurance 5' nv Lv1 6' Lv1 6' Lv 1 10' Lv1 10' Lv 2 10' Lv 2 10' Lv2 10'     [place] and arrow   5\"x10 5''x10 5\"x20        Piriformis stretch      20''x5  20\"x5                                                   "

## 2024-12-09 ENCOUNTER — CONSULT (OUTPATIENT)
Dept: DERMATOLOGY | Facility: CLINIC | Age: 69
End: 2024-12-09
Payer: COMMERCIAL

## 2024-12-09 VITALS — BODY MASS INDEX: 36.45 KG/M2 | WEIGHT: 254 LBS | TEMPERATURE: 97 F

## 2024-12-09 DIAGNOSIS — D22.5 MULTIPLE BENIGN MELANOCYTIC NEVI OF UPPER EXTREMITY, LOWER EXTREMITY, AND TRUNK: ICD-10-CM

## 2024-12-09 DIAGNOSIS — L98.9 SKIN DISORDER: ICD-10-CM

## 2024-12-09 DIAGNOSIS — D22.70 MULTIPLE BENIGN MELANOCYTIC NEVI OF UPPER EXTREMITY, LOWER EXTREMITY, AND TRUNK: ICD-10-CM

## 2024-12-09 DIAGNOSIS — D48.5 NEOPLASM OF UNCERTAIN BEHAVIOR OF SKIN: Primary | ICD-10-CM

## 2024-12-09 DIAGNOSIS — D22.60 MULTIPLE BENIGN MELANOCYTIC NEVI OF UPPER EXTREMITY, LOWER EXTREMITY, AND TRUNK: ICD-10-CM

## 2024-12-09 DIAGNOSIS — L82.1 SEBORRHEIC KERATOSES: ICD-10-CM

## 2024-12-09 DIAGNOSIS — L82.0 INFLAMED SEBORRHEIC KERATOSIS: ICD-10-CM

## 2024-12-09 PROCEDURE — 88312 SPECIAL STAINS GROUP 1: CPT | Performed by: STUDENT IN AN ORGANIZED HEALTH CARE EDUCATION/TRAINING PROGRAM

## 2024-12-09 PROCEDURE — 11103 TANGNTL BX SKIN EA SEP/ADDL: CPT | Performed by: REGISTERED NURSE

## 2024-12-09 PROCEDURE — 88365 INSITU HYBRIDIZATION (FISH): CPT | Performed by: STUDENT IN AN ORGANIZED HEALTH CARE EDUCATION/TRAINING PROGRAM

## 2024-12-09 PROCEDURE — 17110 DESTRUCTION B9 LES UP TO 14: CPT | Performed by: REGISTERED NURSE

## 2024-12-09 PROCEDURE — 99204 OFFICE O/P NEW MOD 45 MIN: CPT | Performed by: REGISTERED NURSE

## 2024-12-09 PROCEDURE — 88307 TISSUE EXAM BY PATHOLOGIST: CPT | Performed by: STUDENT IN AN ORGANIZED HEALTH CARE EDUCATION/TRAINING PROGRAM

## 2024-12-09 PROCEDURE — 88305 TISSUE EXAM BY PATHOLOGIST: CPT | Performed by: STUDENT IN AN ORGANIZED HEALTH CARE EDUCATION/TRAINING PROGRAM

## 2024-12-09 PROCEDURE — 88342 IMHCHEM/IMCYTCHM 1ST ANTB: CPT | Performed by: STUDENT IN AN ORGANIZED HEALTH CARE EDUCATION/TRAINING PROGRAM

## 2024-12-09 PROCEDURE — 88364 INSITU HYBRIDIZATION (FISH): CPT | Performed by: STUDENT IN AN ORGANIZED HEALTH CARE EDUCATION/TRAINING PROGRAM

## 2024-12-09 PROCEDURE — 88341 IMHCHEM/IMCYTCHM EA ADD ANTB: CPT | Performed by: STUDENT IN AN ORGANIZED HEALTH CARE EDUCATION/TRAINING PROGRAM

## 2024-12-09 PROCEDURE — 11102 TANGNTL BX SKIN SINGLE LES: CPT | Performed by: REGISTERED NURSE

## 2024-12-09 NOTE — PROGRESS NOTES
"[facility] Dermatology Clinic Note     Patient Name: Ac Villaseñor  Encounter Date: 12/9/24     Have you been cared for by a [facility] Dermatologist in the last 3 years and, if so, which description applies to you?    NO.   I am considered a \"new\" patient and must complete all patient intake questions. I am MALE/not capable of bearing children.    REVIEW OF SYSTEMS:  Have you recently had or currently have any of the following? Recent fever or chills? No  Any non-healing wound? No   PAST MEDICAL HISTORY:  Have you personally ever had or currently have any of the following?  If \"YES,\" then please provide more detail. Skin cancer (such as Melanoma, Basal Cell Carcinoma, Squamous Cell Carcinoma?  No  Tuberculosis, HIV/AIDS, Hepatitis B or C: No  Radiation Treatment No   HISTORY OF IMMUNOSUPPRESSION:   Do you have a history of any of the following:  Systemic Immunosuppression such as Diabetes, Biologic or Immunotherapy, Chemotherapy, Organ Transplantation, Bone Marrow Transplantation or Prednisone?  YES, Type II diabetes     Answering \"YES\" requires the addition of the dotphrase \"IMMUNOSUPPRESSED\" as the first diagnosis of the patient's visit.   FAMILY HISTORY:  Any \"first degree relatives\" (parent, brother, sister, or child) with the following?    Skin Cancer, Pancreatic or Other Cancer? YES, mother - cervical cancer, brother - skin cancer   PATIENT EXPERIENCE:    Do you want the Dermatologist to perform a COMPLETE skin exam today including a clinical examination under the \"bra and underwear\" areas?  Yes  If necessary, do we have your permission to call and leave a detailed message on your Preferred Phone number that includes your specific medical information?  Yes      Allergies   Allergen Reactions    Pollen Extract Allergic Rhinitis      Current Outpatient Medications:     amLODIPine (NORVASC) 10 mg tablet, TAKE 1 TABLET BY MOUTH EVERY DAY, Disp: 90 tablet, Rfl: 1    B Complex Vitamins (B-COMPLEX/B-12 PO), Take " by mouth, Disp: , Rfl:     celecoxib (CeleBREX) 200 mg capsule, Take 1 capsule (200 mg total) by mouth daily PRN for pain, Disp: 30 capsule, Rfl: 0    diclofenac (VOLTAREN) 75 mg EC tablet, TAKE 1 TABLET BY MOUTH 2 TIMES A DAY AS NEEDED (PAIN) TAKE WITH FOOD, Disp: 180 tablet, Rfl: 1    lisinopril (ZESTRIL) 40 mg tablet, TAKE 1 TABLET BY MOUTH EVERY DAY, Disp: 90 tablet, Rfl: 1    loratadine (CLARITIN) 10 mg tablet, Take 1 tablet by mouth as needed, Disp: , Rfl:     metFORMIN (GLUCOPHAGE) 500 mg tablet, TAKE 1 TABLET BY MOUTH TWICE A DAY WITH FOOD, Disp: 180 tablet, Rfl: 1    Multiple Vitamin (ONE-A-DAY MENS PO), Take by mouth in the morning, Disp: , Rfl:     naproxen sodium (ALEVE) 220 MG tablet, , Disp: , Rfl:     neomycin-polymyxin-hydrocortisone (CORTISPORIN) otic solution, Administer 4 drops to the right ear every 6 (six) hours for 10 days (Patient not taking: Reported on 9/18/2024), Disp: 10 mL, Rfl: 0    Omega-3 Fatty Acids (FISH OIL BURP-LESS PO), Take by mouth, Disp: , Rfl:     omeprazole (PriLOSEC) 20 mg delayed release capsule, Take 20 mg by mouth daily, Disp: , Rfl:           Whom besides the patient is providing clinical information about today's encounter?   NO ADDITIONAL HISTORIAN (patient alone provided history)    Physical Exam and Assessment/Plan by Diagnosis:    CHERRY ANGIOMAS  Physical Exam:  Anatomic Location Affected:  Trunk and extremities  Morphological Description:  Scattered cherry red papules  Denies pain, itch, bleeding. No treatments tried. Present for years. Present constantly; no modifying factors which make it worse or better.     Assessment and Plan:  Based on a thorough discussion of this condition and the management approach to it (including a comprehensive discussion of the known risks, side effects and potential benefits of treatment), the patient (family) agrees to implement the following specific plan:  Reassure benign        SEBORRHEIC KERATOSIS; NON-INFLAMED  Physical  "Exam:  Anatomic Location Affected:  Trunk and extremities  Morphological Description:  Waxy, smooth to warty textured, yellow to brownish-grey to dark brown to blackish, discrete, \"stuck-on\" appearing papules.  Present for years. Denies pain, itch, bleeding.      Additional History of Present Condition:  Present constantly; no modifying factors which make it worse or better. No prior treatment.       Assessment and Plan:  Based on a thorough discussion of this condition and the management approach to it (including a comprehensive discussion of the known risks, side effects and potential benefits of treatment), the patient (family) agrees to implement the following specific plan:  Reassure benign  Use sun protection.  Apply SPF 30 or higher at least three times a day.  Wear sun protecting clothing and hats.        SOLAR LENTIGINES   OTHER SKIN CHANGES DUE TO CHRONIC EXPOSURE TO NONIONIZING RADIATION  Physical Exam:  Anatomic Location Affected:  Sun exposed areas of back, chest, arms, legs  Morphological Description:  Multiple scattered brown to tan evenly pigmented macules   Denies pain, itch, bleeding. No treatments tried. Present for months - years. Reports getting newer lesions with sun exposure.      Assessment and Plan:  Based on a thorough discussion of this condition and the management approach to it (including a comprehensive discussion of the known risks, side effects and potential benefits of treatment), the patient (family) agrees to implement the following specific plan:  Reassure benign  Use sun protection.  Apply SPF 30 or higher at least three times a day.  Wear sun protecting clothing and hats.         MULTIPLE MELANOCYTIC NEVI (\"Moles\")  Physical Exam:  Anatomic Location Affected: Trunk and extremities  Morphological Description:  Scattered, round to ovoid, symmetrical-appearing, even bordered, skin colored to dark brown macules/papules  Denies pain, itch, bleeding. No treatments tried. Present for " "years. Present constantly; no modifying factors which make it worse or better. Denies actively changing or growing moles.      Assessment and Plan:  Based on a thorough discussion of this condition and the management approach to it (including a comprehensive discussion of the known risks, side effects and potential benefits of treatment), the patient (family) agrees to implement the following specific plan:  Reassure benign  Monitor for changes  Use sun protection.  Apply SPF 30 or higher at least three times a day.  Wear sun protecting clothing and hats.    Worrisome signs of skin malignancy discussed, questions answered. Regular self-skin check discussed. Advised to call or return to office if patient notices any spots of concern, rapidly growing/changing lesions, bleeding lesions, non-healing lesions. Advised regular SPF use.        NEOPLASM OF UNCERTAIN BEHAVIOR OF SKIN  Physical Exam:  (Anatomic Location); (Size and Morphological Description); (Differential Diagnosis):  A: right eyebrow; 0.5 cm pink pearly papule; r/o BCC  B: left mid back; 0.6 cm pink papule; r/o BCC  C: left upper back; 0.5 cm pink papule; r/o BCC vs. Scar vs. IDN  Pertinent Positives:  Pertinent Negatives:        Additional History of Present Condition:  patient reports that his spot bleeds at times.    Assessment and Plan:  I have discussed with the patient that a sample of skin via a \"skin biopsy” would be potentially helpful to further make a specific diagnosis under the microscope.  Based on a thorough discussion of this condition and the management approach to it (including a comprehensive discussion of the known risks, side effects and potential benefits of treatment), the patient (family) agrees to implement the following specific plan:    Procedure:  Skin Biopsy.  After a thorough discussion of treatment options and risk/benefits/alternatives (including but not limited to local pain, scarring, dyspigmentation, blistering, possible " superinfection, and inability to confirm a diagnosis via histopathology), verbal and written consent were obtained and portion of the rash was biopsied for tissue sample.  See below for consent that was obtained from patient and subsequent Procedure Note.     PROCEDURE TANGENTIAL (SHAVE) BIOPSY NOTE:  Performing Physician:  Dr. Carter  Anatomic Location; Clinical Description with size (cm); Pre-Op Diagnosis:   A: right eyebrow; 0.5 cm pink pearly papule; r/o BCC  B: left mid back; 0.6 cm pink papule; r/o BCC  C: left upper back; 0.5 cm pink papule; r/o BCC vs. Scar vs. IDN  Post-op diagnosis: Same     Local anesthesia: 1% xylocaine with epi      Topical anesthesia: None    Hemostasis: Aluminum chloride     After obtaining informed consent  at which time there was a discussion about the purpose of biopsy  and low risks of infection and bleeding.  The area was prepped and draped in the usual fashion. Anesthesia was obtained with 1% lidocaine with epinephrine. A shave biopsy to an appropriate sampling depth was obtained by Shave (Dermablade or 15 blade) The resulting wound was covered with surgical ointment and bandaged appropriately.     The patient tolerated the procedure well without complications and was without signs of functional compromise.      Specimen has been sent for review by Dermatopathology.    Standard post-procedure care has been explained and has been included in written form within the patient's copy of Informed Consent.    INFORMED CONSENT DISCUSSION AND POST-OPERATIVE INSTRUCTIONS FOR PATIENT    I.  RATIONALE FOR PROCEDURE  I understand that a skin biopsy allows the Dermatologist to test a lesion or rash under the microscope to obtain a diagnosis.  It usually involves numbing the area with numbing medication and removing a small piece of skin; sometimes the area will be closed with sutures. In this specific procedure, sutures are not usually needed.  If any sutures are placed, then they are  "usually need to be removed in 2 weeks or less.    I understand that my Dermatologist recommends that a skin \"shave\" biopsy be performed today.  A local anesthetic, similar to the kind that a dentist uses when filling a cavity, will be injected with a very small needle into the skin area to be sampled.  The injected skin and tissue underneath \"will go to sleep” and become numb so no pain should be felt afterwards.  An instrument shaped like a tiny \"razor blade\" (shave biopsy instrument) will be used to cut a small piece of tissue and skin from the area so that a sample of tissue can be taken and examined more closely under the microscope.  A slight amount of bleeding will occur, but it will be stopped with direct pressure and a pressure bandage and any other appropriate methods.  I understands that a scar will form where the wound was created.  Surgical ointment will be applied to help protect the wound.  Sutures are not usually needed.    II.  RISKS AND POTENTIAL COMPLICATIONS   I understand the risks and potential complications of a skin biopsy include but are not limited to the following:  Bleeding  Infection  Pain  Scar/keloid  Skin discoloration  Incomplete Removal  Recurrence  Nerve Damage/Numbness/Loss of Function  Allergic Reaction to Anesthesia  Biopsies are diagnostic procedures and based on findings additional treatment or evaluation may be required  Loss or destruction of specimen resulting in no additional findings    My Dermatologist has explained to me the nature of the condition, the nature of the procedure, and the benefits to be reasonably expected compared with alternative approaches.  My Dermatologist has discussed the likelihood of major risks or complications of this procedure including the specific risks listed above, such as bleeding, infection, and scarring/keloid.  I understand that a scar is expected after this procedure.  I understand that my physician cannot predict if the scar will form " "a \"keloid,\" which extends beyond the borders of the wound that is created.  A keloid is a thick, painful, and bumpy scar.  A keloid can be difficult to treat, as it does not always respond well to therapy, which includes injecting cortisone directly into the keloid every few weeks.  While this usually reduces the pain and size of the scar, it does not eliminate it.      I understand that photographs may be taken before and after the procedure.  These will be maintained as part of the medical providers confidential records and may not be made available to me.  I further authorize the medical provider to use the photographs for teaching purposes or to illustrate scientific papers, books, or lectures if in his/her judgment, medical research, education, or science may benefit from its use.    I have had an opportunity to fully inquire about the risks and benefits of this procedure and its alternatives.   I have been given ample time and opportunity to ask questions and to seek a second opinion if I wished to do so.  I acknowledge that there have specifically been no guarantees as to the cosmetic results from the procedure.  I am aware that with any procedure there is always the possibility of an unexpected complication.    III. POST-PROCEDURAL CARE (WHAT YOU WILL NEED TO DO \"AFTER THE BIOPSY\" TO OPTIMIZE HEALING)    Keep the area clean and dry.  Try NOT to remove the bandage or get it wet for the first 24 hours.    Gently clean the area and apply surgical ointment (such as Vaseline petrolatum ointment, which is available \"over the counter\" and not a prescription) to the biopsy site for up to 2 weeks straight.  This acts to protect the wound from the outside world.      Sutures are not usually placed in this procedure.  If any sutures were placed, return for suture removal as instructed (generally 1 week for the face, 2 weeks for the body).      Take Acetaminophen (Tylenol) for discomfort, if no contraindications.  " Ibuprofen or aspirin could make bleeding worse.    Call our office immediately for signs of infection: fever, chills, increased redness, warmth, tenderness, discomfort/pain, or pus or foul smell coming from the wound.    WHAT TO DO IF THERE IS ANY BLEEDING?  If a small amount of bleeding is noticed, place a clean cloth over the area and apply firm pressure for ten minutes.  Check the wound after 10 minutes of direct pressure.  If bleeding persists, try one more time for an additional 10 minutes of direct pressure on the area.  If the bleeding becomes heavier or does not stop after the second attempt, or if you have any other questions about this procedure, then please [facility] by calling [phone] (SKIN).     I hereby acknowledge that I have reviewed and verified the site with my Dermatologist and have requested and authorized my Dermatologist to proceed with the procedure.      INFLAMED SEBORRHEIC KERATOSIS  Physical Exam:  Anatomic Location Affected:  Face, trunk  Morphological Description:  stuck on pink papules   Pertinent Positives:  Pertinent Negatives:    Additional History of Present Condition:  noted on exam.    Assessment and Plan:  Based on a thorough discussion of this condition and the management approach to it (including a comprehensive discussion of the known risks, side effects and potential benefits of treatment), the patient (family) agrees to implement the following specific plan:  Cryotherapy done in office today.    PROCEDURE:  DESTRUCTION OF PRE-MALIGNANT LESIONS  After a thorough discussion of treatment options and risk/benefits/alternatives (including but not limited to local pain, scarring, dyspigmentation, blistering, and possible superinfection), verbal and written consent were obtained and the aforementioned lesions were treated on with cryotherapy using liquid nitrogen x 2 cycles for 5-10 seconds. The patient tolerated the procedure well, and after-care  instructions were provided.     TOTAL NUMBER of 5 pre-malignant lesions were treated today on the ANATOMIC LOCATION: face, trunk.     Patient instructions:  Your pre-cancerous lesions (called actinic keratosis) were treated with liquid nitrogen today. The treated areas will get more red, crusted over the next few days. There might be some blistering. Apply vaseline to the treated area for the next week to help it heal fully. Do not pick at the area. Return in 3-4 weeks for another round of liquid nitrogen treatment if lesion(s)  fails to fully resolve.      Scribe Attestation      I,:  Ana Maria Escoto MA am acting as a scribe while in the presence of the attending physician.:       I,:  Elkin Carter MD personally performed the services described in this documentation    as scribed in my presence.:

## 2024-12-11 ENCOUNTER — OFFICE VISIT (OUTPATIENT)
Dept: PHYSICAL THERAPY | Facility: CLINIC | Age: 69
End: 2024-12-11
Payer: COMMERCIAL

## 2024-12-11 DIAGNOSIS — I10 ESSENTIAL HYPERTENSION: ICD-10-CM

## 2024-12-11 DIAGNOSIS — M54.50 LUMBAR SPINE PAIN: Primary | ICD-10-CM

## 2024-12-11 PROCEDURE — 97110 THERAPEUTIC EXERCISES: CPT | Performed by: PHYSICAL THERAPIST

## 2024-12-11 PROCEDURE — 97140 MANUAL THERAPY 1/> REGIONS: CPT | Performed by: PHYSICAL THERAPIST

## 2024-12-11 PROCEDURE — 97112 NEUROMUSCULAR REEDUCATION: CPT | Performed by: PHYSICAL THERAPIST

## 2024-12-11 NOTE — PROGRESS NOTES
"Daily Note     Today's date: 2024  Patient name: Ac Villaseñor  : 1955  MRN: [id number]  Referring provider: Hi Sauceda DO  Dx:   Encounter Diagnosis     ICD-10-CM    1. Lumbar spine pain  M54.50                      Subjective: pt notes that he feels good today but thinks that it is more related to the warm weather today.       Objective: See treatment diary below      Assessment: Pt overall is doing well. Did have improved l/s jt mobility but still with left ql tightness. Do think that the improvement with symptoms and warm weather is signifying that issue is more arthritic related and pt will have to maintain a consistency with all exercises even when feeling good.       Plan: Continue per plan of care.  Will d/c to hep after next visit. With updated HEP     Precautions: none    Manuals 10/2 10/9 10/15 10/23 10/30 11/14 11/27 12/4 12/11    L/s pa and rot mobs nv nv DB DB   DB  DB    TPR     DL   DL-B ql and gluteals DL-B ql and gluteals    Traction supine hooklying       DB                   Neuro Re-Ed             ppt 5''x10 5\" 2x10 5\" x20  5\"x20 5''x20 5''x20 5\"x20 5''x20    Ball crunch         2x10    Ppt bridge 2x10 2x10 2x10 2x10 2x10  Blue [id number] Blue 2x10 ball 2x10 Blue 2x10 Blue 2x10    Pallof press nv  Gtb 2x10  Blue 2x10        Seated chop nv  Gtb 2x10 Blue 2x10 Blue 2x10        Supine hip abd      Blue 2x10       Rev chop    Blue 2x10 Blue 2x10        Ther Ex             Bike for strength and endurance 5' nv Lv1 6' Lv1 6' Lv 1 10' Lv1 10' Lv 2 10' Lv 2 10' Lv2 10' Lv 3 10'    [place] and arrow   5\"x10 5''x10 5\"x20    5''x20    Piriformis stretch      20''x5  20\"x5 20''x5                                                    "

## 2024-12-12 RX ORDER — AMLODIPINE BESYLATE 10 MG/1
10 TABLET ORAL DAILY
Qty: 90 TABLET | Refills: 1 | Status: SHIPPED | OUTPATIENT
Start: 2024-12-12

## 2024-12-12 RX ORDER — LISINOPRIL 40 MG/1
40 TABLET ORAL DAILY
Qty: 90 TABLET | Refills: 1 | Status: SHIPPED | OUTPATIENT
Start: 2024-12-12

## 2024-12-17 ENCOUNTER — RESULTS FOLLOW-UP (OUTPATIENT)
Dept: DERMATOLOGY | Facility: CLINIC | Age: 69
End: 2024-12-17

## 2024-12-17 DIAGNOSIS — C44.319 BASAL CELL CARCINOMA (BCC) OF EYEBROW: Primary | ICD-10-CM

## 2024-12-17 PROCEDURE — 88364 INSITU HYBRIDIZATION (FISH): CPT | Performed by: STUDENT IN AN ORGANIZED HEALTH CARE EDUCATION/TRAINING PROGRAM

## 2024-12-17 PROCEDURE — 88305 TISSUE EXAM BY PATHOLOGIST: CPT | Performed by: STUDENT IN AN ORGANIZED HEALTH CARE EDUCATION/TRAINING PROGRAM

## 2024-12-17 PROCEDURE — 88307 TISSUE EXAM BY PATHOLOGIST: CPT | Performed by: STUDENT IN AN ORGANIZED HEALTH CARE EDUCATION/TRAINING PROGRAM

## 2024-12-17 PROCEDURE — 88312 SPECIAL STAINS GROUP 1: CPT | Performed by: STUDENT IN AN ORGANIZED HEALTH CARE EDUCATION/TRAINING PROGRAM

## 2024-12-17 PROCEDURE — 88341 IMHCHEM/IMCYTCHM EA ADD ANTB: CPT | Performed by: STUDENT IN AN ORGANIZED HEALTH CARE EDUCATION/TRAINING PROGRAM

## 2024-12-17 PROCEDURE — 88365 INSITU HYBRIDIZATION (FISH): CPT | Performed by: STUDENT IN AN ORGANIZED HEALTH CARE EDUCATION/TRAINING PROGRAM

## 2024-12-17 PROCEDURE — 88342 IMHCHEM/IMCYTCHM 1ST ANTB: CPT | Performed by: STUDENT IN AN ORGANIZED HEALTH CARE EDUCATION/TRAINING PROGRAM

## 2024-12-17 NOTE — RESULT ENCOUNTER NOTE
DERMATOPATHOLOGY RESULT NOTE    Results reviewed by ordering physician.  Called patient to personally discuss results. Discussed results with patient.       Instructions for Clinical Derm Team:   (remember to route Result Note to appropriate staff):    Call patient and schedule for ED&C for lesion B.     Result & Plan by Specimen:    Specimen A: malignant  Plan: MOHs      Specimen B: malignant  Plan: electrodessication and curretage      Specimen C: Premalignant   Plan: Will evaluate at the time of treating lesion B and do cryosurgery should there be any residual lesion.       A. Skin, right eyebrow, shave biopsy:    BASAL CELL CARCINOMA (SUPERFICIAL AND NODULAR TYPE); extends to the tissue edges.       B. Skin, left mid back, shave biopsy:    BASAL CELL CARCINOMA (SUPERFICIAL TYPE); extends to the tissue edges.       C. 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    Note: SOX10, p16, CD31, and p40 immunostains were reviewed, and malignancy is not seen with these immunostains. CD3, CD20, CD30, , , CD5, CD23, BCL-1, and CD68 immunostains were reviewed; findings diagnostic of hematologic malignancy or a lymphoproliferative disorder are not appreciated. A polytypic expression pattern of kappa & lambda light chains is seen by in situ hybridization.  Significant lymphocytic cytologic atypia is not not seen. Pathogenic microorganisms are not seen with PAS stain. Multiple levels examined. Hematopathologist Dr. Vidal has reviewed this case and agrees that the inflammatory infiltrate appears benign and reactive. Conservative removal/destruction (e.g., cryotherapy) of any residual/recurrent lesion is recommended to ensure against local persistence/recurrence.         Electronically signed by Casi Blackmon MD on 12/17/2024 at 0911 EST

## 2024-12-18 ENCOUNTER — OFFICE VISIT (OUTPATIENT)
Dept: PHYSICAL THERAPY | Facility: CLINIC | Age: 69
End: 2024-12-18
Payer: COMMERCIAL

## 2024-12-18 DIAGNOSIS — M54.50 LUMBAR SPINE PAIN: Primary | ICD-10-CM

## 2024-12-18 PROCEDURE — 97140 MANUAL THERAPY 1/> REGIONS: CPT

## 2024-12-18 PROCEDURE — 97110 THERAPEUTIC EXERCISES: CPT

## 2024-12-18 PROCEDURE — 97112 NEUROMUSCULAR REEDUCATION: CPT

## 2024-12-18 NOTE — PROGRESS NOTES
"Daily Note     Today's date: 2024  Patient name: Ac Villaseñor  : 1955  MRN: [id number]  Referring provider: Hi Sauceda DO  Dx:   Encounter Diagnosis     ICD-10-CM    1. Lumbar spine pain  M54.50                      Subjective: pt notes that his back is sore and his knees are stiff.  Changes with weather.       Objective: See treatment diary below      Assessment: Tolerated treatment with good knowledge of exercises being performed.  . Patient will continue with HEP at home to continue to strengthen and increase flexibility to cont with functional activity.       Plan: discharge to  HEP     Precautions: none    Manuals 10/2 10/9 10/15 10/23 10/30 11/14 11/27 12/4 12/11 12/18   L/s pa and rot mobs nv nv DB DB   DB  DB    TPR     DL   DL-B ql and gluteals DL-B ql and gluteals DL   Traction supine hooklying       DB                   Neuro Re-Ed             ppt 5''x10 5\" 2x10 5\" x20  5\"x20 5''x20 5''x20 5\"x20 5''x20 5\"x20   Ball crunch         2x10 2x10   Ppt bridge 2x10 2x10 2x10 2x10 2x10  Blue [id number] Blue 2x10 ball 2x10 Blue 2x10 Blue 2x10 Blk 2x10   Pallof press nv  Gtb 2x10  Blue 2x10        Seated chop nv  Gtb 2x10 Blue 2x10 Blue 2x10     Blk 2x10   Supine hip abd      Blue 2x10       Rev chop    Blue 2x10 Blue 2x10     Blue ball 2x10   Ther Ex             Bike for strength and endurance 5' nv Lv1 6' Lv1 6' Lv 1 10' Lv1 10' Lv 2 10' Lv 2 10' Lv2 10' Lv 3 10' Lv3 10'   [place] and arrow   5\"x10 5''x10 5\"x20    5''x20 5\"x20   Piriformis stretch      20''x5  20\"x5 20''x5                                                      "

## 2024-12-23 ENCOUNTER — TELEPHONE (OUTPATIENT)
Dept: DERMATOLOGY | Facility: CLINIC | Age: 69
End: 2024-12-23

## 2024-12-23 NOTE — LETTER
Ac Villaseñor     1955    [address] [id number]    Dear Ac Villaseñor,    You are scheduled to have the MOHS procedure on July 7th at 9:00 am for BCC right eyebrow with Dr.Ryan Hathaway. Our office is located in [facility] the [facility] our address is [address] [place] [address]. Once you arrive please check in with our front staff in [address] and they will escort you to the MOHS waiting room.  If you have someone bringing you to your appointment they may wait in the waiting room or accompany you in your visit.    Below you will find some pre-op instructions along with some information regarding the MOHS procedure.     If you have any questions please call our office at [phone].       Thank you,    [facility]       PRE-OPERATIVE INSTRUCTIONS - MOHS    Before your scheduled surgery, there are a number of important precautions and positive steps you should take to help prepare yourself for a successful treatment and speedy recovery.    Some of the steps, which are listed below, may seem unnecessary and inconvenient, but they are important. For example, when you stop smoking, you increase your ability to heal. Occasionally, there may be valid reasons, personal or medical, why you can't comply. In such cases, please call the office so we can discuss possible ways to overcome any obstacles you may be encountering.    If you have any questions about the surgery, or remember additional medical information that you forgot to mention to our staff, please contact the office prior to your surgery.    GENERAL INFORMATION REGARDING MOHS MICROGRAPHIC SURGERY    Mohs surgery is a specialized technique for the removal of skin cancer developed by Dr. Frederick Mohs over 50 years ago to improve the cure rates of skin cancer. Traditionally, skin cancers are treated by destructive methods (radiation, freezing, scraping, and    Your discharge plan includes access to our free Care Transitions program.     As your Care Transition Nurse I will contact you via phone within 2 business days after your discharge from the hospital. Please have your discharge instructions and medications ready for review. Over the next 30 days I will call you at least once a week. I am able to assist with arranging follow-up appointments. I have direct contact with your physicians and will alert them with any health or medication concerns or relay your questions.     Your Care Transitions Nurse is RENATA Morgan     If you have any questions or concerns after your discharge and prior to our first call, you can reach me at [phone].   burning) or excision (cutting out the tissue with standards margins and sending it to an outside laboratory for testing). These methods all yield cure rates between 65%-94%. However, for cancers located in cosmetically sensitive areas, large tumors, or tumors unsuccessfully treated by other means, Mohs surgery offers a higher cure rate. In most cases, Mohs surgery provides you with a 99% cure rate for primary (previously untreated) basal cell cancer and a 95% cure rate for primary squamous cell cancer. In Mohs surgery, tissue is removed and processed in a way that we are able to check 100% of the margins, giving the highest cure rate for any method of treating skin cancers while providing maximal preservation of normal skin. This allows the surgeon to produce an optimal cosmetic result for the patient by maximizing the amount of tissue removed yielding as small a scar as possible    On the day of surgery, you will be given local anesthesia only (similar to what was given to you during your initial biopsy). You will remain awake. You will verify the location of the skin cancer prior to the onset of the surgery. Once the area is numb, the tissue containing the skin cancer will be removed, taking a small safety margin. This margin is usually smaller than what would be taken with a standard excision. Once the tissue is removed, it is marked and oriented. The first layer (“Stage I”) will be processed in our laboratory. The wound will be treated for bleeding and a bandage will be placed to keep you comfortable while you wait an approximate 45 minutes-1 hour (for the processing of the tissue) in your room. Your Mohs surgeon will examine the pathology in the lab, checking all the margins. If any tumor remains, you will need to take a second layer of skin (“Stage 2”). The area will be re-anesthetized and your Mohs surgeon will remove more skin only in the area where the tumor exists. This process will continue until all the  skin cancer is removed. Unfortunately, there is no method to predict how many layers or stages will be taken.    Once the tumor has been removed completely, we will discuss the best ways to close the defect. Most wounds may be closed with stitches. A larger wound may require a skin graft or a flap. In rare instances, especially for cancers around the eye or for larger cancers, we may work with another surgeon (oculoplastic, ENT, plastics) with special skills to assist with reconstruction.  If the surgery is coordinated with another specialist, you will have the Mohs portion of the procedure first and see the coordinated specialist after the skin cancer has been removed.  Always follow the pre-operative instructions of the surgeon doing the closure.      Medications: Please take all your normal medications the morning of your surgery. If you are a diabetic, please bring your insulin or medications with you, as well as a snack to avoid having low blood sugar during your day with us.    1) Blood Thinners    VERY IMPORTANT: We do NOT stop or hold prescribed blood thinners (such as Coumadin/Warfarin, Plavix, Eliquis, Pradaxa, Brilinta, Apixaban, Xarelto, Lovonox, Rivaroxaban, or Aggrenox) before Mohs surgery. Additionally, If you take aspirin because you have had a stroke, heart attack, heart disease, other condition, or your physician has prescribed you to take it, please continue your aspirin.    Although there is a risk of increased bruising and bleeding, we are still able to safely perform surgery while continuing these medications. Please NEVER stop your prescribed blood thinner without the managing doctor's (the doctor that prescribed the medication) permission or knowledge. If you have any concerns about not holding your blood thinner, please address these with your Mohs surgeon.    Most people should stop all non-steroidal anti-inflammatory medications (Motrin, Naproxen, Advil, Midol, Aleve, etc.) for 7 days  prior to your scheduled surgery and 2 days after (unless instructed otherwise after surgery).  You may take Tylenol for pain.     Vitamins and Supplements  Avoid taking any supplements with Vitamin E, Fish Oil, Gingko, Ginseng, and Garlic for 2 weeks before and 2 days after your surgery. These thin your blood.    Lidocaine Patches  Avoid wearing any over the counter or prescribed Lidocaine patches the day of the surgery.    Alcohol: Avoid drinking alcohol for 2 days prior to your surgery, and for 2 days afterwards (it thins the blood and causes more bruising and swelling).    Smoking: Try to STOP or reduce smoking significantly the week before your surgery, and especially the week afterwards (it greatly improves how well you heal). Tobacco smoke deprives the blood of oxygen, which is urgently needed by the wound during the healing process.    Contact Lenses: Do not wear them on the day of the surgery. Instead, wear glasses and bring your case, in case we need to remove them.    Clothing: Do not wear your nicest clothing on your surgery day. We recommend wearing a button down shirt that will not disrupt your post-operative dressing when changing later that night. Please avoid wearing jeans during the procedure to help prevent damage to our equipment.     Bathing: On the morning of your surgery, you may bathe or shower normally. If you get your hair done on a weekly basis, remember to get your hair washed the day before surgery.   - You will need to keep your surgical site dry for a minimum of 48 hours after surgery.    Makeup: If your surgery is on the face, please do not wear any makeup on the day of the surgery.    Jewelry: Please try to avoid wearing jewelry on the day of surgery.    Food: On the morning of surgery, have breakfast but limit your intake of caffeinated beverages. They are diuretic and may inconvenience you during surgery. If you are following up with another surgeon the same day as your Mohs  surgery, you must receive permission to eat breakfast from that surgeon.      What to bring with you on the day of your surgery:  Bring snacks - Since you could be at the office long, you may bring snacks and/or lunch with you. Some snacks and drinks are available at the office as well.   Bring a sweater - Bring a sweater or jacket that buttons or zips down the front and will not disturb your wound dressing during removal.  Bring something to do - You will be spending much of the day in our office. There will be 45-60 minute waiting periods  between layers/stages, and while there is a television with cable in every room, it is nice to have something to keep you occupied such as books, magazines, knitting, music, or work.     Planning Ahead:  Other Appointments - It is important to realize that no matter how small the skin cancer appears to be, looks can be deceiving. Since your surgery may last the entire day, you should not schedule any other appointments that day.  Special Occasions - Surgery often creates swelling and bruising. Also, the post-op dressing may be rather large and obvious. Keep this in mind as you arrange your social/work schedule. If an important event is already planned, please check with your referring physician or your Mohs surgeon to see if the surgery can be postponed.  Activity Limits after Surgery - If surgery was performed on your face, we recommend that you keep your activity level to a minimum for 2-3 days (the blood pressure elevation related to exercise can lead to bleeding). If you have stitches in an area that will be under tension or significant movement (neck, back, arms, legs), you will need to avoid heavy lifting (anything over 5 lbs) or exercise for at least 2 weeks and possibly longer. We also advise that you limit out of town travel for the first 7 days after surgery. You should also wait at least 7 days before going into a pool or the ocean.  Housework - Since you will need to  minimize activity after surgery, plan to do your groceries, laundry, gardening, and other heavy household chores prior to your surgery. Please make arrangements for assistance during the post-op period. If surgery is around your mouth area, you may need to eat soft foods, such as soup, milkshakes, or yogurt for 48 hours.    Purchasing bandage supplies: Prior to surgery, please purchase the following items to care for your surgical wound properly.  Cotton swabs (Q-tips)  Vaseline or Aquaphor  Telfa pads (or any non-stick dressing)  Paper tape or Hypafix tape  Gauze pads (3x3)    Transportation: It is often reassuring and comforting to have a  drive you to and from the surgery. He or she is welcome to wait in the office during the surgery. If you do not have a , you may drive to and from your procedure (unless stated otherwise). If the site being treated is near your eye, be aware that the final bandage may cause some obstruction of vision.     Rescheduling: If you need to reschedule your surgery, please notify the office as soon as possible.

## 2024-12-23 NOTE — TELEPHONE ENCOUNTER
Pre- operative Mohs Telephone Scheduling Note    Do you have a pacemaker, defibrillator, spinal or brain stimulator? no    Do you take antibiotics before skin or dental procedures? no  If yes, will likely require pre-operative antibiotics. Ask  the patient why they take the antibiotics (usually because of joint replacement).    Do you have a history of a joint replacements within the past 2 years? no   If yes, will likely require pre-operative antibiotics. Ask if orthopaedic surgeon has prescribed pre-operative antibiotics to take before procedures/dental work?    Do you take any OTC medications that thin your blood (Aspirin, Aleve, Ibuprofen) or supplements that thin your blood (fish oil, garlic, vitamin E, Ginko Biloba)? Fish oil     Do you take any prescribed medications that thin your blood (Coumadin, Plavix, Xarelto, Eliquis or another prescribed blood thinner)? no    Do you have an allergy to lidocaine or epinephrine? no  (felt dizzy after biopsy)    Do you have allergies to Iodine? no    Do you wear a lidocaine patch? no    Have you ever been diagnosed with HIV, AIDS, Hep B and Hep C? no    Do you use a cane, walker or wheelchair? no    Is the patient from a nursing home? no If yes, Is there any special accommodations that is needed for patient N/A    Do you smoke? no      If yes,  patient to try and stop 2 days before surgery and 7 days after the surgery. Minimizing smoking as much as possible during this time will improve healing and the cosmetic result after surgery.    Do you use supplemental oxygen? If so, how many liters and can you be off it for a short period of time? No     Date scheduled: July 7th at 9:00 am with     Coordination of Care with other provider (Oculoplastics, Plastics, ENT) required? no   IF YES, PLEASE FORWARD TO APPROPRIATE PERSONNEL TO HELP COORDINATE.    Are there remaining tumors to be scheduled? no    Was Prior Authorization obtained? No (please use  [email or web address] to document prior auth) No

## 2024-12-26 ENCOUNTER — APPOINTMENT (OUTPATIENT)
Dept: PHYSICAL THERAPY | Facility: CLINIC | Age: 69
End: 2024-12-26
Payer: COMMERCIAL

## 2025-01-15 ENCOUNTER — OFFICE VISIT (OUTPATIENT)
Dept: OBGYN CLINIC | Facility: MEDICAL CENTER | Age: 70
End: 2025-01-15
Payer: COMMERCIAL

## 2025-01-15 VITALS — WEIGHT: 257.2 LBS | BODY MASS INDEX: 36.9 KG/M2

## 2025-01-15 DIAGNOSIS — M17.0 PRIMARY OSTEOARTHRITIS OF BOTH KNEES: Primary | ICD-10-CM

## 2025-01-15 PROCEDURE — 99213 OFFICE O/P EST LOW 20 MIN: CPT | Performed by: ORTHOPAEDIC SURGERY

## 2025-01-15 PROCEDURE — 20610 DRAIN/INJ JOINT/BURSA W/O US: CPT | Performed by: PHYSICIAN ASSISTANT

## 2025-01-15 RX ORDER — TRIAMCINOLONE ACETONIDE 40 MG/ML
40 INJECTION, SUSPENSION INTRA-ARTICULAR; INTRAMUSCULAR
Status: COMPLETED | OUTPATIENT
Start: 2025-01-15 | End: 2025-01-15

## 2025-01-15 RX ORDER — BUPIVACAINE HYDROCHLORIDE 2.5 MG/ML
2 INJECTION, SOLUTION INFILTRATION; PERINEURAL
Status: COMPLETED | OUTPATIENT
Start: 2025-01-15 | End: 2025-01-15

## 2025-01-15 RX ADMIN — TRIAMCINOLONE ACETONIDE 40 MG: 40 INJECTION, SUSPENSION INTRA-ARTICULAR; INTRAMUSCULAR at 08:15

## 2025-01-15 RX ADMIN — BUPIVACAINE HYDROCHLORIDE 2 ML: 2.5 INJECTION, SOLUTION INFILTRATION; PERINEURAL at 08:15

## 2025-01-15 NOTE — PROGRESS NOTES
" Assessment & Plan     1. Primary osteoarthritis of both knees      Orders Placed This Encounter   Procedures    Large joint arthrocentesis         Patient has moderate bilateral knee osteoarthritis.   Non operative and operative treatment options were reviewed with patient today.   Patient is not a surgical candidate at this time.   Injections: Patient received bilateral knee steroid injection today. Tolerated the procedure well. Post injection instructions reviewed including information on glucose monitoring for diabetic patients. Patient aware that they may repeat steroid injection every 3 months if needed.   Plan for next appt:  Follow-up 3 months for possible repeat cortisone injection bilateral knees.      No follow-ups on file.    I answered all of the patient's questions during the visit and provided education of the patient's condition during the visit.  The patient verbalized understanding of the information given and agrees with the plan.  This note was dictated using CBTec software.  It may contain errors including improperly dictated words.  Please contact physician directly for any questions.    History of Present Illness   Chief complaint: No chief complaint on file.      HPI: Ac Villaseñor is a 70 y.o. male that c/o bilateral knee pain.  Patient received bilateral visco injections on 11/15/24 with minimal relief. He claims his right knee felt \"full\" about a week. The left knee received no relief. He is currently in physical therapy for his low back in which he claims he rides the bike for his knees. He received bilateral knee CSI's on 4/10/24 in which he claims he had relief until he received the visco injections. Also, the patient claims his knees feel unstable while ambulating with stairs.     Patient states very little relief from viscosupplementation therapy performed in November.  He states his left knee is definitely worse.    Mechanism of Injury: None  Pain Description: Dull and achy. " Worse on the left   Palliating Factors: Golf  Provoking Factors: Weight bearing  Associated Symptoms: None  Medications: Diclofenac 75mg  Able to take NSAIDs? If not, why: yes  Physical Therapy or Home Exercises: Yes for Lumbar spine. Does do some rehab for knees  Injections: Yes-Visco on 11/15/24  Bracing: no  Previous Surgery: no  Miscellaneous: no     ROS:    See HPI for musculoskeletal review.   All other systems reviewed are negative     Historical Information   Past Medical History:   Diagnosis Date    Arthritis     b/l knees    today 2/22/2022  L knee scope    Colon polyp     CPAP (continuous positive airway pressure) dependence     Diabetes mellitus (HCC)     GERD (gastroesophageal reflux disease)     Hyperlipidemia     Hypertension     Internal derangement of knee involving medial meniscus     Resolved 6/5/2015     Lyme disease     Resolved 11/11/2016     Obesity 2018    Sleep apnea      Past Surgical History:   Procedure Laterality Date    ARTHROSCOPY KNEE Left 2/22/2022    Procedure: ARTHROSCOPY KNEE PARTIAL MEDIAL MENISCECTOMY;  Surgeon: Rekha Hunt DO;  Location: [facility];  Service: Orthopedics    COLONOSCOPY  11/06/2020    All locations appeared normal, no evidence of polyps or masses, small internal hemorrhoids, 5 year recall November 2025    KNEE ARTHROSCOPY Right 1991    UPPER GASTROINTESTINAL ENDOSCOPY  11/06/2020    Normal esophagus, normal stomach (large J-shaped stomach but grossly normal), normal duodenum.  Biopsies negative for celiac disease, metaplasia, dysplasia, H pylori, positive for reactive gastropathy.     Social History   Social History     Substance and Sexual Activity   Alcohol Use Not Currently    Comment: 1 x monthly     Social History     Substance and Sexual Activity   Drug Use No     Social History     Tobacco Use   Smoking Status Never    Passive exposure: Never   Smokeless Tobacco Never     Family History:   Family History   Problem Relation Age of Onset     Cancer Mother     Colon cancer Mother     Diabetes Father     Heart disease Father     Hypertension Father     Cancer Sister        Current Outpatient Medications on File Prior to Visit   Medication Sig Dispense Refill    amLODIPine (NORVASC) 10 mg tablet TAKE 1 TABLET BY MOUTH EVERY DAY 90 tablet 1    B Complex Vitamins (B-COMPLEX/B-12 PO) Take by mouth      celecoxib (CeleBREX) 200 mg capsule Take 1 capsule (200 mg total) by mouth daily PRN for pain 30 capsule 0    diclofenac (VOLTAREN) 75 mg EC tablet TAKE 1 TABLET BY MOUTH 2 TIMES A DAY AS NEEDED (PAIN) TAKE WITH FOOD 180 tablet 1    lisinopril (ZESTRIL) 40 mg tablet TAKE 1 TABLET BY MOUTH EVERY DAY 90 tablet 1    loratadine (CLARITIN) 10 mg tablet Take 1 tablet by mouth as needed      metFORMIN (GLUCOPHAGE) 500 mg tablet TAKE 1 TABLET BY MOUTH TWICE A DAY WITH FOOD 180 tablet 1    Multiple Vitamin (ONE-A-DAY MENS PO) Take by mouth in the morning      naproxen sodium (ALEVE) 220 MG tablet  (Patient not taking: Reported on 1/24/2024)      neomycin-polymyxin-hydrocortisone (CORTISPORIN) otic solution Administer 4 drops to the right ear every 6 (six) hours for 10 days (Patient not taking: Reported on 9/18/2024) 10 mL 0    Omega-3 Fatty Acids (FISH OIL BURP-LESS PO) Take by mouth      omeprazole (PriLOSEC) 20 mg delayed release capsule Take 20 mg by mouth daily       No current facility-administered medications on file prior to visit.     Allergies   Allergen Reactions    Pollen Extract Allergic Rhinitis       Current Outpatient Medications on File Prior to Visit   Medication Sig Dispense Refill    amLODIPine (NORVASC) 10 mg tablet TAKE 1 TABLET BY MOUTH EVERY DAY 90 tablet 1    B Complex Vitamins (B-COMPLEX/B-12 PO) Take by mouth      celecoxib (CeleBREX) 200 mg capsule Take 1 capsule (200 mg total) by mouth daily PRN for pain 30 capsule 0    diclofenac (VOLTAREN) 75 mg EC tablet TAKE 1 TABLET BY MOUTH 2 TIMES A DAY AS NEEDED (PAIN) TAKE WITH FOOD 180 tablet 1     lisinopril (ZESTRIL) 40 mg tablet TAKE 1 TABLET BY MOUTH EVERY DAY 90 tablet 1    loratadine (CLARITIN) 10 mg tablet Take 1 tablet by mouth as needed      metFORMIN (GLUCOPHAGE) 500 mg tablet TAKE 1 TABLET BY MOUTH TWICE A DAY WITH FOOD 180 tablet 1    Multiple Vitamin (ONE-A-DAY MENS PO) Take by mouth in the morning      naproxen sodium (ALEVE) 220 MG tablet  (Patient not taking: Reported on 1/24/2024)      neomycin-polymyxin-hydrocortisone (CORTISPORIN) otic solution Administer 4 drops to the right ear every 6 (six) hours for 10 days (Patient not taking: Reported on 9/18/2024) 10 mL 0    Omega-3 Fatty Acids (FISH OIL BURP-LESS PO) Take by mouth      omeprazole (PriLOSEC) 20 mg delayed release capsule Take 20 mg by mouth daily       No current facility-administered medications on file prior to visit.       Objective   Vitals: Weight 117 kg (257 lb 3.2 oz).,Body mass index is 36.9 kg/m².    PE:  AAOx 3  WDWN  Hearing intact, no drainage from eyes  Regular rate  no audible wheezing  no abdominal distension  LE compartments soft, skin intact    bilateralknee:    Appearance:  +1 effusion left knee  No  ecchymosis  No  obvious joint deformity     Palpation/Tenderness:  Yes  TTP over medial joint line  No  TTP over lateral joint line   Yes TTP over patella  No  TTP over patellar tendon  No  TTP over pes anserine bursa  Active Range of Motion:  AROM: Left 5-120 Right 0-128  Special Tests:  Valgus Stress Test: negative  Varus Stress Test: negative  Medial Jered: negative  Lateral [facility]: negative  Lachman: negative  Anterior/ Posterior Drawer: negative      Imaging Studies:  Previous x-rays reviewed via PACS.  X-rays show moderate to severe varus OA.    Large joint arthrocentesis: bilateral knee  Universal Protocol:  procedure performed by consultantConsent: Verbal consent obtained. Written consent not obtained.  Risks and benefits: risks, benefits and alternatives were discussed  Consent given by: patient  Patient  understanding: patient states understanding of the procedure being performed  Patient consent: the patient's understanding of the procedure matches consent given  Patient identity confirmed: verbally with patient  Supporting Documentation  Indications: pain and joint swelling   Procedure Details  Location: knee - bilateral knee  Needle size: 22 G  Ultrasound guidance: no  Approach: lateral    Medications (Right): 2 mL bupivacaine 0.25 %; 40 mg triamcinolone acetonide 40 mg/mLMedications (Left): 2 mL bupivacaine 0.25 %; 40 mg triamcinolone acetonide 40 mg/mL   Patient tolerance: patient tolerated the procedure well with no immediate complications  Dressing:  Sterile dressing applied

## 2025-01-28 ENCOUNTER — PROCEDURE VISIT (OUTPATIENT)
Age: 70
End: 2025-01-28
Payer: COMMERCIAL

## 2025-01-28 VITALS — TEMPERATURE: 98.5 F | WEIGHT: 260 LBS | BODY MASS INDEX: 37.31 KG/M2

## 2025-01-28 DIAGNOSIS — C44.519 BASAL CELL CARCINOMA (BCC) OF BACK: Primary | ICD-10-CM

## 2025-01-28 DIAGNOSIS — L57.0 LICHENOID ACTINIC KERATOSIS: ICD-10-CM

## 2025-01-28 PROCEDURE — 17262 DSTRJ MAL LES T/A/L 1.1-2.0: CPT | Performed by: REGISTERED NURSE

## 2025-01-28 PROCEDURE — 17000 DESTRUCT PREMALG LESION: CPT | Performed by: REGISTERED NURSE

## 2025-01-28 NOTE — PROGRESS NOTES
"[facility] Note     Patient Name: Ac Villaseñor  Encounter Date: 01/28/2025     Have you been cared for by a [facility] Dermatologist in the last 3 years and, if so, which description applies to you?    Yes.  I have been here within the last 3 years, and my medical history has NOT changed since that time.  I am MALE/not capable of bearing children.    REVIEW OF SYSTEMS:  Have you recently had or currently have any of the following? No changes in my recent health.   PAST MEDICAL HISTORY:  Have you personally ever had or currently have any of the following?  If \"YES,\" then please provide more detail. No changes in my medical history.   HISTORY OF IMMUNOSUPPRESSION: Do you have a history of any of the following:  Systemic Immunosuppression such as Diabetes, Biologic or Immunotherapy, Chemotherapy, Organ Transplantation, Bone Marrow Transplantation or Prednisone?  YES, Diabetes     Answering \"YES\" requires the addition of the dotphrase \"IMMUNOSUPPRESSED\" as the first diagnosis of the patient's visit.   FAMILY HISTORY:  Any \"first degree relatives\" (parent, brother, sister, or child) with the following?    No changes in my family's known health.   PATIENT EXPERIENCE:    Do you want the Dermatologist to perform a COMPLETE skin exam today including a clinical examination under the \"bra and underwear\" areas?  NO  If necessary, do we have your permission to call and leave a detailed message on your Preferred Phone number that includes your specific medical information?  Yes      Allergies   Allergen Reactions    Pollen Extract Allergic Rhinitis      Current Outpatient Medications:     amLODIPine (NORVASC) 10 mg tablet, TAKE 1 TABLET BY MOUTH EVERY DAY, Disp: 90 tablet, Rfl: 1    B Complex Vitamins (B-COMPLEX/B-12 PO), Take by mouth, Disp: , Rfl:     celecoxib (CeleBREX) 200 mg capsule, Take 1 capsule (200 mg total) by mouth daily PRN for pain, Disp: 30 capsule, Rfl: 0    diclofenac (VOLTAREN) 75 mg EC " tablet, TAKE 1 TABLET BY MOUTH 2 TIMES A DAY AS NEEDED (PAIN) TAKE WITH FOOD, Disp: 180 tablet, Rfl: 1    lisinopril (ZESTRIL) 40 mg tablet, TAKE 1 TABLET BY MOUTH EVERY DAY, Disp: 90 tablet, Rfl: 1    loratadine (CLARITIN) 10 mg tablet, Take 1 tablet by mouth as needed, Disp: , Rfl:     metFORMIN (GLUCOPHAGE) 500 mg tablet, TAKE 1 TABLET BY MOUTH TWICE A DAY WITH FOOD, Disp: 180 tablet, Rfl: 1    Multiple Vitamin (ONE-A-DAY MENS PO), Take by mouth in the morning, Disp: , Rfl:     Omega-3 Fatty Acids (FISH OIL BURP-LESS PO), Take by mouth, Disp: , Rfl:     omeprazole (PriLOSEC) 20 mg delayed release capsule, Take 20 mg by mouth daily, Disp: , Rfl:     naproxen sodium (ALEVE) 220 MG tablet, , Disp: , Rfl:     neomycin-polymyxin-hydrocortisone (CORTISPORIN) otic solution, Administer 4 drops to the right ear every 6 (six) hours for 10 days (Patient not taking: Reported on 9/18/2024), Disp: 10 mL, Rfl: 0          Whom besides the patient is providing clinical information about today's encounter?   NO ADDITIONAL HISTORIAN (patient alone provided history)    Physical Exam and Assessment/Plan by Diagnosis:    CURETTAGE AND DESICCATION Malignant and Premalignant Lesion    Diagnosis: Basal cell carcinoma  Prior biopsy: Yes  Access Number: [id number]  Location left mid back  Size preop 1.4 cm  Size postop 1.6 cm    Additional History of Present Condition:  Biopsy confirmed 12/09/2024.    Assessment and Plan:  Based on a thorough discussion of this condition and the management approach to it (including a comprehensive discussion of the known risks, side effects and potential benefits of treatment), the patient (family) agrees to treat the above described lesion with desiccation and curettage.    Procedure:  The area was cleanly  prepped in usual manner  Anesthesia:1% xylocaine with epi    The above described lesion was aggressively curetted to mid dermis followed by desiccation  Number of cycles of desiccation and curettage  3  A clean dry dressing was placed on site. Oral and written postop care instructions were discussed and reviewed      DISCUSSION OF TREATMENT AND POSTOP CARE FOR PATIENT    What is curettage and desiccation?  Curettage and desiccation is a type of electrosurgery in which a skin lesion is scraped off and heat is applied to the skin surface.    What is involved in curettage and cautery?  Your doctor will explain to you why your skin lesion needs treatment and the procedure involved. You may have to sign a consent form to indicate that you consent to the surgical procedure. Tell your doctor if you are taking any medication, or if you have any allergies or medical conditions.  The doctor will inject some local anaesthetic into the area surrounding the lesion to be treated. This will make the skin go numb so no pain should be felt during the procedure. You may feel a pushing sensation but this should not be painful. The skin lesion is scraped off with a curette, which is like a small spoon with very sharp edges.  The lesion should be sent to a pathology laboratory for analysis. The wound surface is then cauterised with a hot wire beaded tip or electrosurgical unit (diathermy). This stops bleeding and helps destroys any remaining skin tumour cells.  This procedure is usually repeated twice for malignant skin lesions (serial curettage and cautery).  A dressing may be applied and instructions should be given on how to care for your wound.    What types of skin lesions can be treated by curettage?  Curettage is suitable to treat lesions where the material being scraped off is softer than the surrounding skin or when there is a natural cleavage plane between the lesion and the surrounding normal tissue. The following are sometimes treated by curettage:  Squamous cell carcinoma in situ   Actinic keratoses   Basal cell carcinomas that are large, deep or recurrent are usually not suitable for curettage. Lesions with poorly  defined edges are also generally unsuitable.Curettage and desiccation is most often used in more superficial type basal cell carcinoma.    Will I have a scar?  Curettage often results in some sort of scar especially if accompanied by cautery.   The scars from curettage are usually flat and round. They are a similar size to that of the original skin lesion. Some people have an abnormal response to skin healing and these people may get larger scars than usual (keloids and hypertrophic scarring).    How do I look after the wound following skin curettage?  The wound may be tender when the local anaesthetic wears off.  Leave the dressing in place till the nest day. Avoid strenuous exertion and stretching of the area.   If there is any bleeding, press on the wound firmly with a folded towel without looking at it for 30 minutes. If it is still bleeding after this time, seek medical attention.  Follow instructions a written in our consent ,  The wound from curettage will take approximately 2-3 weeks to heal over. The scar will initially be red and raised but usually reduces in colour and size over several months.    LICHENOID ACTINIC KERATOSIS    Physical Exam:  Anatomic Location Affected:  left upper back  Morphological Description:  thin pink papule     Additional History of Present Condition:  Biopsy confirmed 12/09/2024.    Assessment and Plan:  Based on a thorough discussion of this condition and the management approach to it (including a comprehensive discussion of the known risks, side effects and potential benefits of treatment), the patient (family) agrees to implement the following specific plan:  Cryotherapy performed    There are several different options for treating actinic keratosis    Topical “medications such as 5-fluorouracil or Aldara  - good for field treatment ie treats what's seen and not seen    Cryotherapy - good for single spots but treats “only what we see” versus a field treatment    Photodynamic  therapy - involves application of a light sensitizing medicine and then exposure to a special light, also a good field treatment      Today we opted to treat your actinic keratosis with Cryotherapy.      PROCEDURE:  DESTRUCTION OF PRE-MALIGNANT LESIONS  After a thorough discussion of treatment options and risk/benefits/alternatives (including but not limited to local pain, scarring, dyspigmentation, blistering, and possible superinfection), verbal and written consent were obtained and the aforementioned lesions were treated on with cryotherapy using liquid nitrogen x 1 cycle for 5-10 seconds.    TOTAL NUMBER of 1 pre-malignant lesions were treated today on the ANATOMIC LOCATION: left upper back.     The patient tolerated the procedure well, and after-care instructions were provided.      Scribe Attestation      I,:  Ana Renee MA am acting as a scribe while in the presence of the attending physician.:       I,:  Elkin Carter MD personally performed the services described in this documentation    as scribed in my presence.:

## 2025-03-16 DIAGNOSIS — M25.561 ACUTE PAIN OF RIGHT KNEE: ICD-10-CM

## 2025-03-16 DIAGNOSIS — M17.11 PRIMARY OSTEOARTHRITIS OF RIGHT KNEE: ICD-10-CM

## 2025-03-17 RX ORDER — DICLOFENAC SODIUM 75 MG/1
75 TABLET, DELAYED RELEASE ORAL 2 TIMES DAILY PRN
Qty: 60 TABLET | Refills: 0 | Status: SHIPPED | OUTPATIENT
Start: 2025-03-17

## 2025-03-19 ENCOUNTER — OFFICE VISIT (OUTPATIENT)
Dept: FAMILY MEDICINE CLINIC | Facility: CLINIC | Age: 70
End: 2025-03-19
Payer: COMMERCIAL

## 2025-03-19 VITALS
TEMPERATURE: 98.4 F | OXYGEN SATURATION: 96 % | HEIGHT: 70 IN | WEIGHT: 262.6 LBS | DIASTOLIC BLOOD PRESSURE: 80 MMHG | SYSTOLIC BLOOD PRESSURE: 128 MMHG | BODY MASS INDEX: 37.59 KG/M2 | HEART RATE: 65 BPM | RESPIRATION RATE: 20 BRPM

## 2025-03-19 DIAGNOSIS — Z79.899 HIGH RISK MEDICATION USE: ICD-10-CM

## 2025-03-19 DIAGNOSIS — Z00.00 MEDICARE ANNUAL WELLNESS VISIT, SUBSEQUENT: Primary | ICD-10-CM

## 2025-03-19 DIAGNOSIS — N40.1 BENIGN PROSTATIC HYPERPLASIA WITH URINARY FREQUENCY: ICD-10-CM

## 2025-03-19 DIAGNOSIS — Z12.5 SCREENING FOR PROSTATE CANCER: ICD-10-CM

## 2025-03-19 DIAGNOSIS — R35.0 BENIGN PROSTATIC HYPERPLASIA WITH URINARY FREQUENCY: ICD-10-CM

## 2025-03-19 DIAGNOSIS — E11.628 TYPE 2 DIABETES MELLITUS WITH OTHER SKIN COMPLICATIONS (HCC): ICD-10-CM

## 2025-03-19 DIAGNOSIS — E78.5 HYPERLIPIDEMIA, UNSPECIFIED HYPERLIPIDEMIA TYPE: ICD-10-CM

## 2025-03-19 PROCEDURE — G0439 PPPS, SUBSEQ VISIT: HCPCS | Performed by: FAMILY MEDICINE

## 2025-03-19 NOTE — PROGRESS NOTES
Name: Ac Villaseñor      : 1955      MRN: [id number]  Encounter Provider: Hi Sauceda DO  Encounter Date: 3/19/2025   Encounter department: [facility]    Assessment & Plan  Medicare annual wellness visit, subsequent  Medicare wellness visit completed  Labs ordered patient will continue current medications  He is up-to-date on health maintenance he can follow-up in 6 months or sooner if needed       Type 2 diabetes mellitus with other skin complications (HCC)    Lab Results   Component Value Date    HGBA1C 5.9 2024       Orders:    Comprehensive metabolic panel; Future    Hemoglobin A1C; Future    UA (URINE) with reflex to Scope; Future    Albumin / creatinine urine ratio; Future      Depression Screening and Follow-up Plan: Patient was screened for depression during today's encounter. They screened negative with a PHQ-2 score of 0.        Preventive health issues were discussed with patient, and age appropriate screening tests were ordered as noted in patient's After Visit Summary. Personalized health advice and appropriate referrals for health education or preventive services given if needed, as noted in patient's After Visit Summary.    History of Present Illness     HPI   Patient Care Team:  Hi Sauceda DO as PCP - General  Hi Sauceda DO as PCP - PCP-Health system ([facility])    Review of Systems   Constitutional: Negative.    HENT: Negative.     Eyes: Negative.    Respiratory: Negative.     Cardiovascular: Negative.    Gastrointestinal: Negative.    Endocrine: Negative.    Genitourinary:  Positive for frequency.   Musculoskeletal: Negative.    Skin: Negative.    Allergic/Immunologic: Negative.    Neurological: Negative.    Hematological: Negative.    Psychiatric/Behavioral: Negative.     All other systems reviewed and are negative.    Medical History Reviewed by provider this encounter:       Annual Wellness Visit Questionnaire   Ac is here for his Subsequent  Wellness visit. Last Medicare Wellness visit information reviewed, patient interviewed, no change since last AWV.     Health Risk Assessment:   Patient rates overall health as good. Patient feels that their physical health rating is slightly better. Patient is satisfied with their life. Eyesight was rated as same. Hearing was rated as same. Patient feels that their emotional and mental health rating is same. Patients states they are never, rarely angry. Patient states they are sometimes unusually tired/fatigued. Pain experienced in the last 7 days has been some. Patient's pain rating has been 3/10. Patient states that he has experienced weight loss or gain in last 6 months. Gained over the holidays    Depression Screening:   PHQ-2 Score: 0      Fall Risk Screening:   In the past year, patient has experienced: no history of falling in past year      Home Safety:  Patient does not have trouble with stairs inside or outside of their home. Patient has working smoke alarms and has no working carbon monoxide detector. Home safety hazards include: none.     Nutrition:   Current diet is Regular and Diabetic.     Medications:   Patient is not currently taking any over-the-counter supplements. Patient is able to manage medications.     Activities of Daily Living (ADLs)/Instrumental Activities of Daily Living (IADLs):   Walk and transfer into and out of bed and chair?: Yes  Dress and groom yourself?: Yes    Bathe or shower yourself?: Yes    Feed yourself? Yes  Do your laundry/housekeeping?: Yes  Manage your money, pay your bills and track your expenses?: Yes  Make your own meals?: Yes    Do your own shopping?: Yes    Previous Hospitalizations:   Any hospitalizations or ED visits within the last 12 months?: No      Advance Care Planning:   Living will: No    Durable POA for healthcare: No    Advanced directive: No    Advanced directive counseling given: Yes    End of Life Decisions reviewed with patient: Yes    Provider agrees  with end of life decisions: Yes      Cognitive Screening:   Provider or family/friend/caregiver concerned regarding cognition?: No    PREVENTIVE SCREENINGS      Cardiovascular Screening:    General: Screening Current      Diabetes Screening:     General: History Diabetes and Screening Current      Colorectal Cancer Screening:     General: Screening Current      Prostate Cancer Screening:    General: Screening Current      Osteoporosis Screening:    General: Screening Not Indicated      Abdominal Aortic Aneurysm (AAA) Screening:    Risk factors include: age between 65-74 yo        General: Screening Not Indicated      Lung Cancer Screening:     General: Screening Not Indicated      Hepatitis C Screening:    General: Screening Not Indicated    Screening, Brief Intervention, and Referral to Treatment (SBIRT)     Screening  Typical number of drinks in a day: 1  Typical number of drinks in a week: 3  Interpretation: Low risk drinking behavior.    AUDIT-C Screenin) How often did you have a drink containing alcohol in the past year? monthly or less  2) How many drinks did you have on a typical day when you were drinking in the past year? 1 to 2  3) How often did you have 6 or more drinks on one occasion in the past year? never    AUDIT-C Score: 1  Interpretation: Score 0-3 (male): Negative screen for alcohol misuse    Single Item Drug Screening:  How often have you used an illegal drug (including marijuana) or a prescription medication for non-medical reasons in the past year? never    Single Item Drug Screen Score: 0  Interpretation: Negative screen for possible drug use disorder    Brief Intervention  Alcohol & drug use screenings were reviewed. No concerns regarding substance use disorder identified.     Other Counseling Topics:   Car/seat belt/driving safety, skin self-exam, sunscreen and regular weightbearing exercise and calcium and vitamin D intake.     Social Drivers of Health     Financial Resource Strain:  "Low Risk  (3/9/2024)    Overall Financial Resource Strain (CARDIA)     Difficulty of Paying Living Expenses: Not hard at all   Food Insecurity: No Food Insecurity (3/18/2025)    Hunger Vital Sign     Worried About Running Out of Food in the Last Year: Never true     Ran Out of Food in the Last Year: Never true   Transportation Needs: No Transportation Needs (3/18/2025)    PRAPARE - Transportation     Lack of Transportation (Medical): No     Lack of Transportation (Non-Medical): No   Housing Stability: Unknown (3/18/2025)    Housing Stability Vital Sign     Unable to Pay for Housing in the Last Year: No     Homeless in the Last Year: No   Utilities: Not At Risk (3/18/2025)    [facility] Utilities     Threatened with loss of utilities: No     No results found.    Objective   /78 (BP Location: Left arm, Patient Position: Sitting, Cuff Size: Large)   Pulse 65   Temp 98.4 °F (36.9 °C) (Tympanic)   Resp 20   Ht 5' 10\" (1.778 m)   Wt 119 kg (262 lb 9.6 oz)   SpO2 96%   BMI 37.68 kg/m²     Physical Exam  Vitals and nursing note reviewed.   Constitutional:       Appearance: Normal appearance. He is well-developed.   HENT:      Head: Normocephalic.      Right Ear: External ear normal.      Left Ear: External ear normal.      Nose: Nose normal.   Eyes:      Conjunctiva/sclera: Conjunctivae normal.      Pupils: Pupils are equal, round, and reactive to light.   Cardiovascular:      Rate and Rhythm: Normal rate and regular rhythm.      Pulses: no weak pulses.           Dorsalis pedis pulses are 2+ on the right side and 2+ on the left side.        Posterior tibial pulses are 2+ on the right side and 2+ on the left side.      Heart sounds: Normal heart sounds.   Pulmonary:      Effort: Pulmonary effort is normal.      Breath sounds: Normal breath sounds.   Abdominal:      General: Bowel sounds are normal.      Palpations: Abdomen is soft.   Musculoskeletal:         General: Normal range of motion.      Cervical back: Normal " range of motion and neck supple.   Feet:      Right foot:      Skin integrity: No ulcer, skin breakdown, erythema, warmth, callus or dry skin.      Left foot:      Skin integrity: No ulcer, skin breakdown, erythema, warmth, callus or dry skin.   Skin:     General: Skin is warm and dry.   Neurological:      General: No focal deficit present.      Mental Status: He is alert and oriented to person, place, and time.   Psychiatric:         Behavior: Behavior normal.         Thought Content: Thought content normal.         Judgment: Judgment normal.           Diabetic Foot Exam    Patient's shoes and socks removed.    Right Foot/Ankle   Right Foot Inspection  Skin Exam: skin normal. Skin not intact, no dry skin, no warmth, no callus, no erythema, no maceration, no abnormal color, no pre-ulcer, no ulcer and no callus.     Toe Exam: ROM and strength within normal limits.     Sensory   Vibration: diminished  Proprioception: intact  Monofilament testing: intact    Vascular  Capillary refills: < 3 seconds  The right DP pulse is 2+. The right PT pulse is 2+.     Left Foot/Ankle  Left Foot Inspection  Skin Exam: skin normal. Skin not intact, no dry skin, no warmth, no erythema, no maceration, normal color, no pre-ulcer, no ulcer and no callus.     Toe Exam: ROM and strength within normal limits.     Sensory   Vibration: diminished  Proprioception: intact  Monofilament testing: intact    Vascular  Capillary refills: < 3 seconds  The left DP pulse is 2+. The left PT pulse is 2+.     Assign Risk Category  No deformity present  No loss of protective sensation  No weak pulses  Risk: 0

## 2025-03-19 NOTE — PATIENT INSTRUCTIONS
Medicare Preventive Visit Patient Instructions  Thank you for completing your Welcome to Medicare Visit or Medicare Annual Wellness Visit today. Your next wellness visit will be due in one year (3/20/2026).  The screening/preventive services that you may require over the next 5-10 years are detailed below. Some tests may not apply to you based off risk factors and/or age. Screening tests ordered at today's visit but not completed yet may show as past due. Also, please note that scanned in results may not display below.  Preventive Screenings:  Service Recommendations Previous Testing/Comments   Colorectal Cancer Screening  Colonoscopy    Fecal Occult Blood Test (FOBT)/Fecal Immunochemical Test (FIT)  Fecal DNA/Cologuard Test  Flexible Sigmoidoscopy Age: 45-75 years old   Colonoscopy: every 10 years (May be performed more frequently if at higher risk)  OR  FOBT/FIT: every 1 year  OR  Cologuard: every 3 years  OR  Sigmoidoscopy: every 5 years  Screening may be recommended earlier than age 45 if at higher risk for colorectal cancer. Also, an individualized decision between you and your healthcare provider will decide whether screening between the ages of 76-85 would be appropriate. Colonoscopy: 11/06/2020  FOBT/FIT: Not on file  Cologuard: Not on file  Sigmoidoscopy: Not on file          Prostate Cancer Screening Individualized decision between patient and health care provider in men between ages of 55-69   Medicare will cover every 12 months beginning on the day after your 50th birthday PSA: 0.5 ng/mL           Hepatitis C Screening Once for adults born between 1945 and 1965  More frequently in patients at high risk for Hepatitis C Hep C Antibody: 03/16/2019        Diabetes Screening 1-2 times per year if you're at risk for diabetes or have pre-diabetes Fasting glucose: No results in last 5 years (No results in last 5 years)  A1C: 5.9 (9/18/2024)      Cholesterol Screening Once every 5 years if you don't have a lipid  disorder. May order more often based on risk factors. Lipid panel: 01/27/2024         Other Preventive Screenings Covered by Medicare:  Abdominal Aortic Aneurysm (AAA) Screening: covered once if your at risk. You're considered to be at risk if you have a family history of AAA or a male between the age of 65-75 who smoking at least 100 cigarettes in your lifetime.  Lung Cancer Screening: covers low dose CT scan once per year if you meet all of the following conditions: (1) Age 55-77; (2) No signs or symptoms of lung cancer; (3) Current smoker or have quit smoking within the last 15 years; (4) You have a tobacco smoking history of at least 20 pack years (packs per day x number of years you smoked); (5) You get a written order from a healthcare provider.  Glaucoma Screening: covered annually if you're considered high risk: (1) You have diabetes OR (2) Family history of glaucoma OR (3)  aged 50 and older OR (4)  American aged 65 and older  Osteoporosis Screening: covered every 2 years if you meet one of the following conditions: (1) Have a vertebral abnormality; (2) On glucocorticoid therapy for more than 3 months; (3) Have primary hyperparathyroidism; (4) On osteoporosis medications and need to assess response to drug therapy.  HIV Screening: covered annually if you're between the age of 15-65. Also covered annually if you are younger than 15 and older than 65 with risk factors for HIV infection. For pregnant patients, it is covered up to 3 times per pregnancy.    Immunizations:  Immunization Recommendations   Influenza Vaccine Annual influenza vaccination during flu season is recommended for all persons aged >= 6 months who do not have contraindications   Pneumococcal Vaccine   * Pneumococcal conjugate vaccine = PCV13 (Prevnar 13), PCV15 (Vaxneuvance), PCV20 (Prevnar 20)  * Pneumococcal polysaccharide vaccine = PPSV23 (Pneumovax) Adults 19-65 yo with certain risk factors or if 65+ yo  If never  received any pneumonia vaccine: recommend Prevnar 20 (PCV20)  Give PCV20 if previously received 1 dose of PCV13 or PPSV23   Hepatitis B Vaccine 3 dose series if at intermediate or high risk (ex: diabetes, end stage renal disease, liver disease)   Respiratory syncytial virus (RSV) Vaccine - COVERED BY MEDICARE PART D  * RSVPreF3 (Arexvy) CDC recommends that adults 60 years of age and older may receive a single dose of RSV vaccine using shared clinical decision-making (SCDM)   Tetanus (Td) Vaccine - COST NOT COVERED BY MEDICARE PART B Following completion of primary series, a booster dose should be given every 10 years to maintain immunity against tetanus. Td may also be given as tetanus wound prophylaxis.   Tdap Vaccine - COST NOT COVERED BY MEDICARE PART B Recommended at least once for all adults. For pregnant patients, recommended with each pregnancy.   Shingles Vaccine (Shingrix) - COST NOT COVERED BY MEDICARE PART B  2 shot series recommended in those 19 years and older who have or will have weakened immune systems or those 50 years and older     Health Maintenance Due:      Topic Date Due   • Colorectal Cancer Screening  11/06/2025   • Hepatitis C Screening  Completed     Immunizations Due:      Topic Date Due   • Pneumococcal Vaccine: 65+ Years (1 of 2 - PCV) Never done   • Influenza Vaccine (1) 09/01/2024   • COVID-19 Vaccine (4 - 2024-25 season) 09/01/2024     Advance Directives   What are advance directives?  Advance directives are legal documents that state your wishes and plans for medical care. These plans are made ahead of time in case you lose your ability to make decisions for yourself. Advance directives can apply to any medical decision, such as the treatments you want, and if you want to donate organs.   What are the types of advance directives?  There are many types of advance directives, and each state has rules about how to use them. You may choose a combination of any of the following:  Living  will:  This is a written record of the treatment you want. You can also choose which treatments you do not want, which to limit, and which to stop at a certain time. This includes surgery, medicine, IV fluid, and tube feedings.   Durable power of  for healthcare (DPAHC):  This is a written record that states who you want to make healthcare choices for you when you are unable to make them for yourself. This person, called a proxy, is usually a family member or a friend. You may choose more than 1 proxy.  Do not resuscitate (DNR) order:  A DNR order is used in case your heart stops beating or you stop breathing. It is a request not to have certain forms of treatment, such as CPR. A DNR order may be included in other types of advance directives.  Medical directive:  This covers the care that you want if you are in a coma, near death, or unable to make decisions for yourself. You can list the treatments you want for each condition. Treatment may include pain medicine, surgery, blood transfusions, dialysis, IV or tube feedings, and a ventilator (breathing machine).  Values history:  This document has questions about your views, beliefs, and how you feel and think about life. This information can help others choose the care that you would choose.  Why are advance directives important?  An advance directive helps you control your care. Although spoken wishes may be used, it is better to have your wishes written down. Spoken wishes can be misunderstood, or not followed. Treatments may be given even if you do not want them. An advance directive may make it easier for your family to make difficult choices about your care.   Weight Management   Why it is important to manage your weight:  Being overweight increases your risk of health conditions such as heart disease, high blood pressure, type 2 diabetes, and certain types of cancer. It can also increase your risk for osteoarthritis, sleep apnea, and other respiratory  problems. Aim for a slow, steady weight loss. Even a small amount of weight loss can lower your risk of health problems.  How to lose weight safely:  A safe and healthy way to lose weight is to eat fewer calories and get regular exercise. You can lose up about 1 pound a week by decreasing the number of calories you eat by 500 calories each day.   Healthy meal plan for weight management:  A healthy meal plan includes a variety of foods, contains fewer calories, and helps you stay healthy. A healthy meal plan includes the following:  Eat whole-grain foods more often.  A healthy meal plan should contain fiber. Fiber is the part of grains, fruits, and vegetables that is not broken down by your body. Whole-grain foods are healthy and provide extra fiber in your diet. Some examples of whole-grain foods are whole-wheat breads and pastas, oatmeal, brown rice, and bulgur.  Eat a variety of vegetables every day.  Include dark, leafy greens such as spinach, kale, jakob greens, and mustard greens. Eat yellow and orange vegetables such as carrots, sweet potatoes, and winter squash.   Eat a variety of fruits every day.  Choose fresh or canned fruit (canned in its own juice or light syrup) instead of juice. Fruit juice has very little or no fiber.  Eat low-fat dairy foods.  Drink fat-free (skim) milk or 1% milk. Eat fat-free yogurt and low-fat cottage cheese. Try low-fat cheeses such as mozzarella and other reduced-fat cheeses.  Choose meat and other protein foods that are low in fat.  Choose beans or other legumes such as split peas or lentils. Choose fish, skinless poultry (chicken or turkey), or lean cuts of red meat (beef or pork). Before you cook meat or poultry, cut off any visible fat.   Use less fat and oil.  Try baking foods instead of frying them. Add less fat, such as margarine, sour cream, regular salad dressing and mayonnaise to foods. Eat fewer high-fat foods. Some examples of high-fat foods include french fries,  doughnuts, ice cream, and cakes.  Eat fewer sweets.  Limit foods and drinks that are high in sugar. This includes candy, cookies, regular soda, and sweetened drinks.  Exercise:  Exercise at least 30 minutes per day on most days of the week. Some examples of exercise include walking, biking, dancing, and swimming. You can also fit in more physical activity by taking the stairs instead of the elevator or parking farther away from stores. Ask your healthcare provider about the best exercise plan for you.      © Copyright [employer] 2018 Information is for End User's use only and may not be sold, redistributed or otherwise used for commercial purposes. All illustrations and images included in CareNotes® are the copyrighted property of [employer] or [address]

## 2025-03-19 NOTE — ASSESSMENT & PLAN NOTE
Lab Results   Component Value Date    HGBA1C 5.9 09/18/2024       Orders:    Comprehensive metabolic panel; Future    Hemoglobin A1C; Future    UA (URINE) with reflex to Scope; Future    Albumin / creatinine urine ratio; Future

## 2025-03-25 LAB
ALBUMIN SERPL-MCNC: 4.3 G/DL (ref 3.9–4.9)
ALBUMIN/CREAT UR: <2 MG/G CREAT (ref 0–29)
ALP SERPL-CCNC: 58 IU/L (ref 44–121)
ALT SERPL-CCNC: 33 IU/L (ref 0–44)
APPEARANCE UR: CLEAR
AST SERPL-CCNC: 26 IU/L (ref 0–40)
BILIRUB SERPL-MCNC: 0.4 MG/DL (ref 0–1.2)
BILIRUB UR QL STRIP: NEGATIVE
BUN SERPL-MCNC: 17 MG/DL (ref 8–27)
BUN/CREAT SERPL: 23 (ref 10–24)
CALCIUM SERPL-MCNC: 9.6 MG/DL (ref 8.6–10.2)
CHLORIDE SERPL-SCNC: 103 MMOL/L (ref 96–106)
CHOLEST SERPL-MCNC: 167 MG/DL (ref 100–199)
CHOLEST/HDLC SERPL: 4.8 RATIO (ref 0–5)
CO2 SERPL-SCNC: 24 MMOL/L (ref 20–29)
COLOR UR: YELLOW
CREAT SERPL-MCNC: 0.74 MG/DL (ref 0.76–1.27)
CREAT UR-MCNC: 132.3 MG/DL
EGFR: 99 ML/MIN/1.73
ERYTHROCYTE [DISTWIDTH] IN BLOOD BY AUTOMATED COUNT: 12.3 % (ref 11.6–15.4)
EST. AVERAGE GLUCOSE BLD GHB EST-MCNC: 137 MG/DL
GLOBULIN SER-MCNC: 2.4 G/DL (ref 1.5–4.5)
GLUCOSE SERPL-MCNC: 126 MG/DL (ref 70–99)
GLUCOSE UR QL: NEGATIVE
HBA1C MFR BLD: 6.4 % (ref 4.8–5.6)
HCT VFR BLD AUTO: 39.8 % (ref 37.5–51)
HDLC SERPL-MCNC: 35 MG/DL
HGB BLD-MCNC: 13.7 G/DL (ref 13–17.7)
HGB UR QL STRIP: NEGATIVE
KETONES UR QL STRIP: NEGATIVE
LDLC SERPL CALC-MCNC: 115 MG/DL (ref 0–99)
LEUKOCYTE ESTERASE UR QL STRIP: NEGATIVE
MCH RBC QN AUTO: 33.3 PG (ref 26.6–33)
MCHC RBC AUTO-ENTMCNC: 34.4 G/DL (ref 31.5–35.7)
MCV RBC AUTO: 97 FL (ref 79–97)
MICRO URNS: NORMAL
MICROALBUMIN UR-MCNC: <3 UG/ML
NITRITE UR QL STRIP: NEGATIVE
PH UR STRIP: 5.5 [PH] (ref 5–7.5)
PLATELET # BLD AUTO: 186 X10E3/UL (ref 150–450)
POTASSIUM SERPL-SCNC: 4.4 MMOL/L (ref 3.5–5.2)
PROT SERPL-MCNC: 6.7 G/DL (ref 6–8.5)
PROT UR QL STRIP: NEGATIVE
PSA SERPL-MCNC: 0.9 NG/ML (ref 0–4)
RBC # BLD AUTO: 4.11 X10E6/UL (ref 4.14–5.8)
SL AMB REFLEX CRITERIA: NORMAL
SL AMB VLDL CHOLESTEROL CALC: 17 MG/DL (ref 5–40)
SODIUM SERPL-SCNC: 141 MMOL/L (ref 134–144)
SP GR UR: 1.02 (ref 1–1.03)
TRIGL SERPL-MCNC: 89 MG/DL (ref 0–149)
UROBILINOGEN UR STRIP-ACNC: 1 MG/DL (ref 0.2–1)
WBC # BLD AUTO: 4.7 X10E3/UL (ref 3.4–10.8)

## 2025-04-01 ENCOUNTER — RESULTS FOLLOW-UP (OUTPATIENT)
Dept: FAMILY MEDICINE CLINIC | Facility: CLINIC | Age: 70
End: 2025-04-01

## 2025-04-17 ENCOUNTER — OFFICE VISIT (OUTPATIENT)
Dept: OBGYN CLINIC | Facility: MEDICAL CENTER | Age: 70
End: 2025-04-17
Payer: COMMERCIAL

## 2025-04-17 VITALS — HEIGHT: 70 IN | WEIGHT: 258 LBS | BODY MASS INDEX: 36.94 KG/M2

## 2025-04-17 DIAGNOSIS — M17.0 BILATERAL PRIMARY OSTEOARTHRITIS OF KNEE: Primary | ICD-10-CM

## 2025-04-17 PROCEDURE — 20610 DRAIN/INJ JOINT/BURSA W/O US: CPT | Performed by: ORTHOPAEDIC SURGERY

## 2025-04-17 PROCEDURE — 99213 OFFICE O/P EST LOW 20 MIN: CPT | Performed by: ORTHOPAEDIC SURGERY

## 2025-04-17 RX ORDER — TRIAMCINOLONE ACETONIDE 40 MG/ML
40 INJECTION, SUSPENSION INTRA-ARTICULAR; INTRAMUSCULAR
Status: COMPLETED | OUTPATIENT
Start: 2025-04-17 | End: 2025-04-17

## 2025-04-17 RX ORDER — BUPIVACAINE HYDROCHLORIDE 2.5 MG/ML
2 INJECTION, SOLUTION INFILTRATION; PERINEURAL
Status: COMPLETED | OUTPATIENT
Start: 2025-04-17 | End: 2025-04-17

## 2025-04-17 RX ADMIN — TRIAMCINOLONE ACETONIDE 40 MG: 40 INJECTION, SUSPENSION INTRA-ARTICULAR; INTRAMUSCULAR at 08:00

## 2025-04-17 RX ADMIN — BUPIVACAINE HYDROCHLORIDE 2 ML: 2.5 INJECTION, SOLUTION INFILTRATION; PERINEURAL at 08:00

## 2025-04-17 NOTE — PROGRESS NOTES
Assessment & Plan  Bilateral primary osteoarthritis of knee    Patient has moderate bilateral knee osteoarthritis.   Non operative and operative treatment options were reviewed with patient today.   Injections: Patient received bilateral knee steroid injection today. Tolerated the procedure well. Post injection instructions reviewed including information on glucose monitoring for diabetic patients. Patient aware that they may repeat steroid injection every 3 months if needed.  Patient receives symptomatic relief from steroid injections compared to gel injections.  Plan for next appt: Follow-up 3 months for possible repeat cortisone injection bilateral knees.        Return in about 3 months (around 7/17/2025).    I answered all of the patient's questions during the visit and provided education of the patient's condition during the visit.  The patient verbalized understanding of the information given and agrees with the plan.  This note was dictated using Sup[employer]olid software.  It may contain errors including improperly dictated words.  Please contact physician directly for any questions.    History of Present Illness   Chief complaint:   Chief Complaint   Patient presents with    Left Knee - Follow-up     More bothersome// Injections improved pain    Right Knee - Follow-up       HPI: Ac Villaseñor is a 70 y.o. male that c/o bilateral knee pain.  Patient has moderate bilateral knee osteoarthritis.    Mechanism of Injury: None  Pain Description: Dull and achy. Worse on the left   Palliating Factors: Golf  Provoking Factors: Weight bearing  Associated Symptoms: None  Medications: Diclofenac 75mg  Able to take NSAIDs? If not, why: yes  Physical Therapy or Home Exercises: Yes for Lumbar spine. Does do some rehab for knees  Injections: Yes-Visco on 11/15/24 with little to no relief.  Patient last had steroid injections on 1/15/2025 with 2.5 months of relief.  Bracing: OTC Bracing  Previous Surgery: no  Miscellaneous: no      ROS:    See HPI for musculoskeletal review.   All other systems reviewed are negative     Historical Information   Past Medical History:   Diagnosis Date    Arthritis     b/l knees    today 2/22/2022  L knee scope    Basal cell carcinoma 12/09/2024    left mid back    Colon polyp     CPAP (continuous positive airway pressure) dependence     Diabetes mellitus (HCC)     GERD (gastroesophageal reflux disease)     Hyperlipidemia     Hypertension     Internal derangement of knee involving medial meniscus     Resolved 6/5/2015     Lyme disease     Resolved 11/11/2016     Obesity 2018    Sleep apnea      Past Surgical History:   Procedure Laterality Date    ARTHROSCOPY KNEE Left 2/22/2022    Procedure: ARTHROSCOPY KNEE PARTIAL MEDIAL MENISCECTOMY;  Surgeon: Rekha Hunt DO;  Location: [facility];  Service: Orthopedics    COLONOSCOPY  11/06/2020    All locations appeared normal, no evidence of polyps or masses, small internal hemorrhoids, 5 year recall November 2025    KNEE ARTHROSCOPY Right 1991    UPPER GASTROINTESTINAL ENDOSCOPY  11/06/2020    Normal esophagus, normal stomach (large J-shaped stomach but grossly normal), normal duodenum.  Biopsies negative for celiac disease, metaplasia, dysplasia, H pylori, positive for reactive gastropathy.     Social History   Social History     Substance and Sexual Activity   Alcohol Use Not Currently    Comment: 1 x monthly     Social History     Substance and Sexual Activity   Drug Use No     Social History     Tobacco Use   Smoking Status Never    Passive exposure: Never   Smokeless Tobacco Never     Family History:   Family History   Problem Relation Age of Onset    Cancer Mother     Colon cancer Mother     Diabetes Father     Heart disease Father     Hypertension Father     Cancer Sister        Current Outpatient Medications on File Prior to Visit   Medication Sig Dispense Refill    amLODIPine (NORVASC) 10 mg tablet TAKE 1 TABLET BY MOUTH EVERY DAY 90 tablet 1    B  "Complex Vitamins (B-COMPLEX/B-12 PO) Take by mouth      celecoxib (CeleBREX) 200 mg capsule Take 1 capsule (200 mg total) by mouth daily PRN for pain 30 capsule 0    diclofenac (VOLTAREN) 75 mg EC tablet TAKE 1 TABLET BY MOUTH TWICE A DAY WITH FOOD AS NEEDED FOR PAIN 60 tablet 0    lisinopril (ZESTRIL) 40 mg tablet TAKE 1 TABLET BY MOUTH EVERY DAY 90 tablet 1    loratadine (CLARITIN) 10 mg tablet Take 1 tablet by mouth as needed      metFORMIN (GLUCOPHAGE) 500 mg tablet TAKE 1 TABLET BY MOUTH TWICE A DAY WITH FOOD 180 tablet 1    Multiple Vitamin (ONE-A-DAY MENS PO) Take by mouth in the morning      Omega-3 Fatty Acids (FISH OIL BURP-LESS PO) Take by mouth      omeprazole (PriLOSEC) 20 mg delayed release capsule Take 20 mg by mouth daily       No current facility-administered medications on file prior to visit.     Allergies   Allergen Reactions    Pollen Extract Allergic Rhinitis       Current Outpatient Medications on File Prior to Visit   Medication Sig Dispense Refill    amLODIPine (NORVASC) 10 mg tablet TAKE 1 TABLET BY MOUTH EVERY DAY 90 tablet 1    B Complex Vitamins (B-COMPLEX/B-12 PO) Take by mouth      celecoxib (CeleBREX) 200 mg capsule Take 1 capsule (200 mg total) by mouth daily PRN for pain 30 capsule 0    diclofenac (VOLTAREN) 75 mg EC tablet TAKE 1 TABLET BY MOUTH TWICE A DAY WITH FOOD AS NEEDED FOR PAIN 60 tablet 0    lisinopril (ZESTRIL) 40 mg tablet TAKE 1 TABLET BY MOUTH EVERY DAY 90 tablet 1    loratadine (CLARITIN) 10 mg tablet Take 1 tablet by mouth as needed      metFORMIN (GLUCOPHAGE) 500 mg tablet TAKE 1 TABLET BY MOUTH TWICE A DAY WITH FOOD 180 tablet 1    Multiple Vitamin (ONE-A-DAY MENS PO) Take by mouth in the morning      Omega-3 Fatty Acids (FISH OIL BURP-LESS PO) Take by mouth      omeprazole (PriLOSEC) 20 mg delayed release capsule Take 20 mg by mouth daily       No current facility-administered medications on file prior to visit.       Objective   Vitals: Height 5' 10\" (1.778 m), " weight 117 kg (258 lb).,Body mass index is 37.02 kg/m².    PE:  AAOx 3  WDWN  Hearing intact, no drainage from eyes  Regular rate  no audible wheezing  no abdominal distension  LE compartments soft, skin intact    bilateralknee:    Appearance:  No effusion  No  ecchymosis  No  obvious joint deformity     Palpation/Tenderness:  Yes  TTP over medial joint line  No  TTP over lateral joint line   Yes TTP over patella  No  TTP over patellar tendon  No  TTP over pes anserine bursa  Active Range of Motion:  AROM: 0-120  Special Tests:  Valgus Stress Test: negative  Varus Stress Test: negative  Medial [facility]: negative  Lateral Jered: negative  Lachman: negative  Anterior/ Posterior Drawer: negative        Large joint arthrocentesis: bilateral knee  Universal Protocol:  procedure performed by consultantConsent: Verbal consent obtained.  Risks and benefits: risks, benefits and alternatives were discussed  Consent given by: patient  Patient understanding: patient states understanding of the procedure being performed  Site marked: the operative site was marked  Patient identity confirmed: verbally with patient  Supporting Documentation  Indications: pain   Procedure Details  Location: knee - bilateral knee  Needle size: 22 G  Ultrasound guidance: no  Approach: anterolateral    Medications (Right): 40 mg triamcinolone acetonide 40 mg/mL; 2 mL bupivacaine 0.25 %Medications (Left): 40 mg triamcinolone acetonide 40 mg/mL; 2 mL bupivacaine 0.25 %   Patient tolerance: patient tolerated the procedure well with no immediate complications  Dressing:  Sterile dressing applied

## 2025-04-19 DIAGNOSIS — M25.561 ACUTE PAIN OF RIGHT KNEE: ICD-10-CM

## 2025-04-19 DIAGNOSIS — M17.11 PRIMARY OSTEOARTHRITIS OF RIGHT KNEE: ICD-10-CM

## 2025-04-21 RX ORDER — DICLOFENAC SODIUM 75 MG/1
75 TABLET, DELAYED RELEASE ORAL 2 TIMES DAILY PRN
Qty: 60 TABLET | Refills: 0 | Status: SHIPPED | OUTPATIENT
Start: 2025-04-21

## 2025-05-09 ENCOUNTER — OFFICE VISIT (OUTPATIENT)
Dept: SLEEP CENTER | Facility: CLINIC | Age: 70
End: 2025-05-09
Payer: COMMERCIAL

## 2025-05-09 VITALS
SYSTOLIC BLOOD PRESSURE: 125 MMHG | WEIGHT: 256 LBS | HEIGHT: 70 IN | BODY MASS INDEX: 36.65 KG/M2 | DIASTOLIC BLOOD PRESSURE: 79 MMHG

## 2025-05-09 DIAGNOSIS — R68.2 DRY MOUTH: ICD-10-CM

## 2025-05-09 DIAGNOSIS — G47.33 OSA (OBSTRUCTIVE SLEEP APNEA): Primary | ICD-10-CM

## 2025-05-09 DIAGNOSIS — R40.0 DAYTIME SLEEPINESS: ICD-10-CM

## 2025-05-09 DIAGNOSIS — I10 PRIMARY HYPERTENSION: ICD-10-CM

## 2025-05-09 DIAGNOSIS — I10 ESSENTIAL HYPERTENSION: ICD-10-CM

## 2025-05-09 DIAGNOSIS — K21.9 GASTROESOPHAGEAL REFLUX DISEASE, UNSPECIFIED WHETHER ESOPHAGITIS PRESENT: ICD-10-CM

## 2025-05-09 DIAGNOSIS — R09.81 NASAL CONGESTION: ICD-10-CM

## 2025-05-09 DIAGNOSIS — E66.9 OBESITY (BMI 30-39.9): ICD-10-CM

## 2025-05-09 PROCEDURE — 99214 OFFICE O/P EST MOD 30 MIN: CPT | Performed by: INTERNAL MEDICINE

## 2025-05-09 NOTE — PROGRESS NOTES
Name: Ac Villaseñor      : 1955      MRN: [id number]  Encounter Provider: Juan Manuel Harley MD  Encounter Date: 2025   Encounter department: [facility]  :  Assessment & Plan  NINI (obstructive sleep apnea)    Orders:    PAP DME Resupply/Reorder    Daytime sleepiness         Essential hypertension         Nasal congestion         Dry mouth         Gastroesophageal reflux disease, unspecified whether esophagitis present         Primary hypertension         Obesity (BMI 30-39.9)               PLAN: Above listed Problems & Comorbidities were Addressed this Visit.  Improved/stable/controlled/resolved conditions as reviewed in notes.   Results of prior studies and physiologic data reviewed  with the patient.   I discussed treatment options with risks and benefits.  Treatment with  PAP is medically necessary and Ac is agreable to continue use.   Care of equipment, methods to improve comfort using PAP and importance of compliance with therapy were discussed.  Pressure setting:continue 15-17 cmH2O. Mask type full face  Rx provided to replace supplies and Care coordinated with DME provider.   Discussed strategies for weight reduction.    Follow-up is advised in 1 year or sooner if needed to monitor progress, compliance and to adjust therapy.        History of Present Illness   HPI          Follow-Up Note - Sleep Center   Ac Villaseñor  70 y.o. male  :1955  MRN:[id number]  DOS:2025    CC: I saw this patient for follow-up in clinic today for Sleep disordered breathing, Coexisting Sleep and Medical Problems.  Patient received ot a  Dream Station Version 2 machine from [employer] over a year ago.. Interval changes: None reported.      Results of prior studies: A diagnostic study in  demonstrated an AHI of 26/h, considerably higher during REM at 68/h.  Minimum oxygen saturation was 74% and he spent 22.4% of time asleep with saturations below 90%. During a titration study in  2017, sleep disordered breathing was successfully treated with CPAP at 17 cm H2O    [facility], Problem List, Medications & Allergies were reviewed in EMR.   He  has a past medical history of Arthritis, Basal cell carcinoma (12/09/2024), Colon polyp, CPAP (continuous positive airway pressure) dependence, Diabetes mellitus (HCC), GERD (gastroesophageal reflux disease), Hyperlipidemia, Hypertension, Internal derangement of knee involving medial meniscus, Lyme disease, Obesity (2018), and Sleep apnea.    He has a current medication list which includes the following prescription(s): amlodipine, b complex vitamins, celecoxib, diclofenac, lisinopril, loratadine, metformin, multiple vitamin, omega-3 fatty acids, and omeprazole.    PHYSIOLOGICAL DATA REVIEW : Device has been used 20/26 days and average use is just over 4 hours.  Using PAP > 4 hours/night 69%. Estimated BEULAH 4.5/hour with pressure of 16.3cm H2O@90th/95th percentile;.  INTERPRETATION: Compliance is good; Pressure setting is:within target range; ;     SUBJECTIVE: With respect to use of PAP, Ac  is experiencing some adverse effects: dry mouth/throat.He derives benefit.. Is satisfied with sleep and daytime function.     Sleep Routine: Ac reports getting (Patient-Rptd) (P) 8 hrs sleep; he has no difficulty initiating, but reports diffculty maintaining sleep . He arises before alarm most days and usually feels refreshed.Ac (Patient-Rptd) (P) Rarely]reports excessive daytime sleepiness, but typically dozes off when sedentary at home in the evenings  He rated himself at Total score: (Patient-Rptd) (P) 6 /24 on the Clarence Sleepiness Scale.   Other issues: None reported.     Habits: Reports that he has never smoked. He has never been exposed to tobacco smoke. He has never used smokeless tobacco.,  Reports that he does not currently use alcohol.,  Reports no history of drug use., Caffeine use: none until  ; Exercise routine: regular.      ROS: Significant for  "weight fluctuates in the range of a few pounds.  He has nasal symptoms due to seasonal allergies.  Acid reflux is controlled. Review of systems was otherwise negative..    EXAM: /79 (BP Location: Left arm, Patient Position: Sitting, Cuff Size: Large)   Ht 5' 10\" (1.778 m)   Wt 116 kg (256 lb)   BMI 36.73 kg/m²     Wt Readings from Last 3 Encounters:   05/09/25 116 kg (256 lb)   05/08/25 117 kg (258 lb)   04/17/25 117 kg (258 lb)      Patient is well groomed; well appearing.   CNS: Alert, orientated, speech clear & coherent  Psych: cooperative and in no distress. Mental state: Appears normal.  H&N: EOMI; NC/AT: No facial pressure marks, no rashes.    Skin/Extrem: col & hydration normal; no edema  Resp: Respiratory effort is normal  Physical findings otherwise essentially unchanged from previous.    Sincerely,     Authenticated electronically on 05/09/25   Board Certified Specialist     Portions of the record may have been created with voice recognition software. Occasional wrong word or \"sound a like\" substitutions may have occurred due to the inherent limitations of voice recognition software. There may also be notations and random deletions of words or characters from malfunctioning software. Read the chart carefully and recognize, using context, where substitutions/deletions have occurred.          Review of Systems           "

## 2025-05-12 ENCOUNTER — TELEPHONE (OUTPATIENT)
Age: 70
End: 2025-05-12

## 2025-05-13 DIAGNOSIS — E11.9 TYPE 2 DIABETES MELLITUS WITHOUT COMPLICATION, WITHOUT LONG-TERM CURRENT USE OF INSULIN (HCC): ICD-10-CM

## 2025-06-14 DIAGNOSIS — I10 ESSENTIAL HYPERTENSION: ICD-10-CM

## 2025-06-14 RX ORDER — LISINOPRIL 40 MG/1
40 TABLET ORAL DAILY
Qty: 90 TABLET | Refills: 1 | Status: SHIPPED | OUTPATIENT
Start: 2025-06-14

## 2025-06-20 DIAGNOSIS — I10 ESSENTIAL HYPERTENSION: ICD-10-CM

## 2025-06-20 RX ORDER — AMLODIPINE BESYLATE 10 MG/1
10 TABLET ORAL DAILY
Qty: 90 TABLET | Refills: 1 | Status: SHIPPED | OUTPATIENT
Start: 2025-06-20

## 2025-07-06 DIAGNOSIS — M17.11 PRIMARY OSTEOARTHRITIS OF RIGHT KNEE: ICD-10-CM

## 2025-07-06 DIAGNOSIS — M25.561 ACUTE PAIN OF RIGHT KNEE: ICD-10-CM

## 2025-07-07 ENCOUNTER — PROCEDURE VISIT (OUTPATIENT)
Dept: DERMATOLOGY | Facility: CLINIC | Age: 70
End: 2025-07-07
Attending: REGISTERED NURSE

## 2025-07-07 VITALS
TEMPERATURE: 99.4 F | HEART RATE: 60 BPM | DIASTOLIC BLOOD PRESSURE: 74 MMHG | HEIGHT: 70 IN | BODY MASS INDEX: 36.71 KG/M2 | OXYGEN SATURATION: 97 % | WEIGHT: 256.4 LBS | SYSTOLIC BLOOD PRESSURE: 144 MMHG

## 2025-07-07 DIAGNOSIS — C44.319 BASAL CELL CARCINOMA (BCC) OF EYEBROW: ICD-10-CM

## 2025-07-07 NOTE — PATIENT INSTRUCTIONS
"Mohs Surgery After Care    WOUND CARE AFTER SURGERY:    Try NOT to remove the pressure bandage for 48 hours. Keep the area clean and dry while this bandage is on.     After removing the bandage for the first time, gently clean the area with soap and water. If the bandage is difficult to remove, getting the bandage wet in the shower will sometimes help soften the adhesive and allow it to be removed more easily.     You will now need to cleanse this area daily in the shower with gentle soap. There is no need to scrub the area. There is no need for further wound care for 2 weeks. You will notice over this time that the glue will start to flake off. Do not apply and Vaseline or Aquaphor to the area as this will dissolve your skin glue.  If you would like to keep the area covered, non stick dressing and paper tape (or Hypafix) are recommended for sensitive skin but a bandaid is fine if it covers the area well.    After 2 weeks, you can go ahead and use some Vaseline or Aquaphor to help dissolve any remaining glue.     RESTRICTIONS:     For two DAYS:   - You will need to take it very easy as this time is highest risk for bleeding. Being a \"couch potato\" during these two days is generally recommended.   - For surgeries on the face/neck/scalp: Avoid leaning down to pick things up off the floor as this brings blood up to your head. Instead, squat down to pick things up.     For two WEEKS:   - No heavy lifting (anything greater than 10 pounds)   - You can start to do slow, gentle activities such as slow walking but nothing to increase your heart rate and blood pressure too much (such as cardiovascular exercise). It is important to take it easy as there is still a risk for bleeding and a high risk popping of stitches open during this time.     If we did surgery near the eyes (including the nose, forehead, front part of your scalp, cheeks): It is VERY common to get a large amount of swelling around your eyes (puffy eyes). " Although less frequent, this can be enough to swell your eyes shut and can also come along with bruising. This should not hurt and is very expected and normal. It is typically worst at ~ 3 days out from your surgery and dramatically better 1 week post-operatively.     MANAGING YOUR PAIN AFTER SURGERY     You can expect to have some pain after surgery. This is normal. The pain is typically worse the first two days after surgery, and quickly begins to get better.     The best strategy for controlling your pain after surgery is around the clock pain control. You can take over the counter Acetaminophen (Tylenol) for discomfort, if no contraindications.     If you are taking this at the maximum dose, you can alternate this with Motrin (ibuprofen or Advil) as well. Alternating these medications with each other allows you to maximize your pain control. In addition to Tylenol and Motrin, you can use heating pads or ice packs on your incisions to help reduce your pain.     How will I alternate your regular strength over-the-counter pain medication?  You will take a dose of pain medication every three hours.   Start by taking 650 mg of Tylenol (2 pills of 325 mg)   3 hours later take 600 mg of Motrin (3 pills of 200 mg)   3 hours after taking the Motrin take 650 mg of Tylenol   3 hours after that take 600 mg of Motrin.    See example - if your first dose of Tylenol is at 12:00 PM     12:00 PM  Tylenol 650 mg (2 pills of 325 mg)    3:00 PM  Motrin 600 mg (3 pills of 200 mg)    6:00 PM  Tylenol 650 mg (2 pills of 325 mg)    9:00 PM  Motrin 600 mg (3 pills of 200 mg)    Continue alternating every 3 hours      Important:   Do not take more than 4000mg of Tylenol or 3200mg of Motrin in a 24-hour period.     What if I still have pain?   If you have pain that is not controlled with the over-the-counter pain medications (Tylenol and Motrin or Advil), don't hesitate to call our staff using the number provided. We will help make sure  you are managing your pain in the best way possible, and if necessary, we can provide a prescription for additional pain medication.     CALL OUR OFFICE IMMEDIATELY FOR ANY SIGNS OF INFECTION:    This includes fever, chills, increased redness, warmth, tenderness, severe discomfort/pain, or pus or foul smell coming from the wound. If you are experiencing any of the above, please call [facility] Mohs Department directly at [phone].    IF BLEEDING IS NOTICED:    Place a clean cloth over the area and apply firm pressure for thirty minutes.  Check the wound ONLY after 30 minutes of direct pressure; do not cheat and sneak a peak, as that does not count.  If bleeding persists after 30 minutes of legitimate direct pressure, then try one more round of direct pressure to the area.  Should the bleeding become heavier or not stop after the second attempt, call [facility] directly at [phone]. Your call will get routed to the dermatology surgeon on call even after hours.

## 2025-07-07 NOTE — PROGRESS NOTES
Mohs Procedure Note    Patient: Ac Villaseñor  : 1955  MRN: [id number]  Date: 2025    History of Present Illness: The patient is a 70 y.o. male who presents with complaints of Basal cell carcinoma of the right eyebrow.     Past Medical History[1]    Past Surgical History[2]    Current Medications[3]    Allergies[4]    Physical Exam:   Vitals:    25 0835   BP: 144/74   Pulse: 60   Temp: 99.4 °F (37.4 °C)   SpO2: 97%       Skin: 1.3 cm x 0.9 cm pink hypopigmented scar    Assessment: Biopsy confirmed basal cell carcinoma superficial and nodular types of the right eyebrow.     Plan: Mohs    Mohs Procedure Timeout      Flowsheet Row Most Recent Value   Timeout: 900   Patient Identity Verified: Yes   Correct Site Verified: Yes   Correct Procedure Verified: Yes            Mohs Diagnosis/Indication/Location/ID      Flowsheet Row Most Recent Value   Pathology Type Basal cell carcinoma   Anatomic Site right eyebrow   Indications for Mohs tumor location   Mohs ID [id number]            Mohs Site/Accession/Pre-Post      Flowsheet Row Most Recent Value   Original Site Identified (as submitted by referring clinician) Photo, Referral   Biopsy Accession/Specimen # (as submitted by referring clincian)    Pre-Mohs Size Length (cm) 1.3   Pre-Mohs Size Width (cm): 0.9   Post-Mohs Size-Length (cm) 1.7   Post Mohs Size-Width (cm) 1.5   Repair Type Intermediate layered closure  [cyanoacrylate applied]   Suture Type Vicryl   Vicryl Suture Size 4   Final repair length (cm): 3   Anesthetic Used 1% Lidocaine with epinephrine  [6.8 mL]            Mohs Tumor Stage 1 Information      Flowsheet Row Most Recent Value   Tissue Sections (blocks) 2   Microscopic Exam Section 1: No tumor identified in section.   Microscopic Exam Section 2: No tumor identified in section.   Tumor Clear After Stage I? Yes                      Patient identified, procedure verified, site identified and verified. Time out completed. Surgical  removal of the lesion discussed with the patient (risks and benefits, including possibility of scarring, infection, recurrence or potential for further treatment).    I have specifically identified the site with the patient. I have discussed the fact that the patient will have a scar after the procedure regardless of granulation or repair with sutures. I have discussed that the repair options can range from granulation in some cases to linear or curvilinear closures to larger flaps or grafts.  There are sometimes flaps or grafts used that require multiples stages of surgery and will not be completed today, rather be completed over a series of appointments. I have discussed that occasionally due to location, size or depth of the lesion I may recommend consultation with and transfer of care for further removal or the reconstruction to another provider such as ophthalmology surgery, plastic surgery, ENT surgery, or surgical oncology. There are cases in which other testing such as imaging with MRI or CT scan or testing of lymph nodes is recommended because of the nature/depth/location of tumor seen during the removal. There is a risk of injury to nerves causing temporary or permanent numbness or the inability to move muscles full such as the inability to lift eyebrows. Questions answered and verbal and written consent was obtained.    The tumor qualifies for Mohs based on AUC criteria. Dr. Amezquita served as the surgeon and pathologist during the procedure.    Stage I:  Mohs surgery was performed in the first stage. A 2mm margin was taken around the visible and palpable tumor and biopsy scar. Excision of the residual wound resulted in 2 gross section(s). Hemostasis was obtained by spot electrocoagulation and a pressure dressing was placed. The patient tolerated the procedure well and no complications were noted.  Adjacent edges of each gross section were color coded and multiple, horizontal, frozen sections were  prepared from the undersurface of the gross sections. The total microscopic area was examined and tumor extension was plotted on the anatomic map. A tumor free plane was demonstrated and excision of the tumor was complete. The final defect measured 1.7x1.5 cm and extended to the level of the subcutaneous fat. Because of the size and depth of the defect, the defect needed to be closed.    With the patient in the supine position and under adequate local anesthesia with 1% lidocaine with epinephrine 1:100,000, the defect was scrubbed with Chlorhexidine. Sterile drapes were placed from the sterile tray.  Because of the location of the surgical defect, an intermediate closure was judged to give the best possible cosmetic and functional result.  The edges of the defect were carefully debrided removing any dead or coagulated tissue.      Hemostasis was obtained by pinpoint electrocoagulation.  Careful planning of removal of redundant tissue at either end of the defect was drawn out so that the suture lines would fall in the optimal orientation with regard to the relaxed skin tension lines.  These were then removed with a #15c blade scalpel.  The wound was then approximated by a deep layer of buried vertical mattress sutures and the cutaneous margins were approximated and closed by cyanoacrylate.  Estimated blood loss was less than 5 mL.      The patient tolerated the procedure well.  The wound was dressed with a non-stick pad and a compression dressing.     Candice Amezquita MD served as the surgeon and pathologist during the procedure.    Postoperative care: Wound care discussed at length and written instructions provided.  I urged the patient to call us if any problems or question should arise.     Complications: none  Post-op medications: none  Patient condition after procedure: stable  Discharge plans: Plan for follow up as planned with general dermatologist for skin checks or sooner if needed for healing surgical site.      Scribe Attestation      I,:  Tamar Pham MA am acting as a scribe while in the presence of the attending physician.:       I,:  Candice Amezquita MD personally performed the services described in this documentation    as scribed in my presence.:                  [1]   Past Medical History:  Diagnosis Date    Arthritis     b/l knees    today 2/22/2022  L knee scope    Basal cell carcinoma 12/09/2024    left mid back    BCC (basal cell carcinoma) 12/09/2024    right eyebrow, Mohs    Colon polyp     CPAP (continuous positive airway pressure) dependence     Diabetes mellitus (HCC)     GERD (gastroesophageal reflux disease)     Hyperlipidemia     Hypertension     Internal derangement of knee involving medial meniscus     Resolved 6/5/2015     Lyme disease     Resolved 11/11/2016     Obesity 2018    Sleep apnea    [2]   Past Surgical History:  Procedure Laterality Date    ARTHROSCOPY KNEE Left 02/22/2022    Procedure: ARTHROSCOPY KNEE PARTIAL MEDIAL MENISCECTOMY;  Surgeon: Rekha Hunt DO;  Location: [facility];  Service: Orthopedics    COLONOSCOPY  11/06/2020    All locations appeared normal, no evidence of polyps or masses, small internal hemorrhoids, 5 year recall November 2025    KNEE ARTHROSCOPY Right 1991    MOHS SURGERY  07/07/2025    BCC right eyebrow, Dr Amezquita    UPPER GASTROINTESTINAL ENDOSCOPY  11/06/2020    Normal esophagus, normal stomach (large J-shaped stomach but grossly normal), normal duodenum.  Biopsies negative for celiac disease, metaplasia, dysplasia, H pylori, positive for reactive gastropathy.   [3]   Current Outpatient Medications:     amLODIPine (NORVASC) 10 mg tablet, TAKE 1 TABLET BY MOUTH EVERY DAY, Disp: 90 tablet, Rfl: 1    B Complex Vitamins (B-COMPLEX/B-12 PO), Take by mouth, Disp: , Rfl:     celecoxib (CeleBREX) 200 mg capsule, Take 1 capsule (200 mg total) by mouth daily PRN for pain, Disp: 30 capsule, Rfl: 0    diclofenac (VOLTAREN) 75 mg EC tablet, TAKE 1  TABLET BY MOUTH TWICE A DAY WITH FOOD AS NEEDED FOR PAIN, Disp: 60 tablet, Rfl: 0    lisinopril (ZESTRIL) 40 mg tablet, TAKE 1 TABLET BY MOUTH EVERY DAY, Disp: 90 tablet, Rfl: 1    loratadine (CLARITIN) 10 mg tablet, Take 1 tablet by mouth as needed, Disp: , Rfl:     metFORMIN (GLUCOPHAGE) 500 mg tablet, TAKE 1 TABLET BY MOUTH TWICE A DAY WITH FOOD, Disp: 180 tablet, Rfl: 1    Multiple Vitamin (ONE-A-DAY MENS PO), Take by mouth in the morning, Disp: , Rfl:     Omega-3 Fatty Acids (FISH OIL BURP-LESS PO), Take by mouth, Disp: , Rfl:     omeprazole (PriLOSEC) 20 mg delayed release capsule, Take 20 mg by mouth in the morning., Disp: , Rfl:   [4]   Allergies  Allergen Reactions    Pollen Extract Allergic Rhinitis

## 2025-07-08 RX ORDER — DICLOFENAC SODIUM 75 MG/1
75 TABLET, DELAYED RELEASE ORAL 2 TIMES DAILY PRN
Qty: 60 TABLET | Refills: 0 | Status: SHIPPED | OUTPATIENT
Start: 2025-07-08

## 2025-07-11 ENCOUNTER — OFFICE VISIT (OUTPATIENT)
Dept: DERMATOLOGY | Facility: CLINIC | Age: 70
End: 2025-07-11

## 2025-07-11 DIAGNOSIS — Z85.828 HISTORY OF BASAL CELL CARCINOMA (BCC): ICD-10-CM

## 2025-07-11 DIAGNOSIS — D48.5 NEOPLASM OF UNCERTAIN BEHAVIOR OF SKIN: Primary | ICD-10-CM

## 2025-07-11 PROCEDURE — 88305 TISSUE EXAM BY PATHOLOGIST: CPT | Performed by: STUDENT IN AN ORGANIZED HEALTH CARE EDUCATION/TRAINING PROGRAM

## 2025-07-11 NOTE — PROGRESS NOTES
"[facility] Note     Patient Name: Ac Villaseñor  Encounter Date: 7/11/2025     Have you been cared for by a [facility]ologist in the last 3 years and, if so, which description applies to you? Yes. I have been here within the last 3 years, and my medical history has NOT changed since that time. I am not of child-bearing potential.     REVIEW OF SYSTEMS:  Have you recently had or currently have any of the following? No changes in my recent health.   PAST MEDICAL HISTORY:  Have you personally ever had or currently have any of the following?  If \"YES,\" then please provide more detail. No changes in my medical history.   HISTORY OF IMMUNOSUPPRESSION: Do you have a history of any of the following:  Systemic Immunosuppression such as Diabetes, Biologic or Immunotherapy, Chemotherapy, Organ Transplantation, Bone Marrow Transplantation or Prednisone?  No     Answering \"YES\" requires the addition of the dotphrase \"IMMUNOSUPPRESSED\" as the first diagnosis of the patient's visit.   FAMILY HISTORY:  Any \"first degree relatives\" (parent, brother, sister, or child) with the following?    No changes in my family's known health.   PATIENT EXPERIENCE:    Do you want the Dermatologist to perform a COMPLETE skin exam today including a clinical examination under the \"bra and underwear\" areas?  NO  If necessary, do we have your permission to call and leave a detailed message on your Preferred Phone number that includes your specific medical information?  Yes      Allergies[1] Current Medications[2]        Whom besides the patient is providing clinical information about today's encounter?   NO ADDITIONAL HISTORIAN (patient alone provided history)    Physical Exam and Assessment/Plan by Diagnosis:    HISTORY OF BASAL CELL CARCINOMA    Physical Exam:  Anatomic Location Affected:  Left mid back, right eyebrow.   Morphological Description of scar:  well healed scars   Suspected Recurrence: No    Additional History " "of Present Condition:  History of basal cell carcinoma with no sign of recurrence. Mohs for lesion on eyebrow performed on 7/7/2025 by Dr. Amezquita, and lesion on left mid back treated with ED and C by Dr. Wharton on 1/28/2025.     Assessment and Plan:  Based on a thorough discussion of this condition and the management approach to it (including a comprehensive discussion of the known risks, side effects and potential benefits of treatment), the patient (family) agrees to implement the following specific plan:  Monitor for recurrence.   Counseled on use of sun protection daily. Reviewed latest FDA sunscreen guidelines, including use of broad spectrum (UVA and UVB blocking) sunscreen or sun protective clothing with SPF 30-50 every 2-3 hours and reapplied after exposure to water; use of photoprotective clothing, including a broad brim hat and UPF rated clothing if outdoors for several hours; avoid use of tanning beds as these pose significant risk for melanoma and skin cancer.  Follow up in clinic with provider every 6 months for skin screening.    NEOPLASM OF UNCERTAIN BEHAVIOR OF SKIN    Physical Exam:  (Anatomic Location); (Size and Morphological Description); (Differential Diagnosis):  Specimen A- central upper chest; 0.5cm x 0.5cm pink papule with cutaneous horn; DDX: basal cell carcinoma vs squamous cell carcinoma         Additional History of Present Condition: Reported by patient. First noticed it around 9 months ago. It began changing size and developing symptoms of pain and discomfort in the last 4 months.     Assessment and Plan:  I have discussed with the patient that a sample of skin via a \"skin biopsy” would be potentially helpful to further make a specific diagnosis under the microscope.  Based on a thorough discussion of this condition and the management approach to it (including a comprehensive discussion of the known risks, side effects and potential benefits of treatment), the patient (family) " agrees to implement the following specific plan:    Procedure:  Skin Biopsy.  After a thorough discussion of treatment options and risk/benefits/alternatives (including but not limited to local pain, scarring, dyspigmentation, blistering, possible superinfection, and inability to confirm a diagnosis via histopathology), verbal and written consent were obtained and portion of the rash was biopsied for tissue sample.  See below for consent that was obtained from patient and subsequent Procedure Note.  Will call with results.       PROCEDURE TANGENTIAL (SHAVE) BIOPSY NOTE:    Performing Physician: Dalila Ramos PA-C  Anatomic Location; Clinical Description with size (cm); Pre-Op Diagnosis:   Specimen A- central upper chest; 0.5cm x 0.5cm pink papule with cutaneous horn; DDX: basal cell carcinoma vs squamous cell carcinoma   Post-op diagnosis: Same     Local anesthesia: 1% xylocaine with epi      Topical anesthesia: None    Hemostasis: Aluminum chloride       After obtaining informed consent  at which time there was a discussion about the purpose of biopsy  and low risks of infection and bleeding.  The area was prepped and draped in the usual fashion. Anesthesia was obtained with 1% lidocaine with epinephrine. A shave biopsy to an appropriate sampling depth was obtained by Shave (Dermablade or 15 blade) The resulting wound was covered with surgical ointment and bandaged appropriately.     The patient tolerated the procedure well without complications and was without signs of functional compromise.      Specimen has been sent for review by Dermatopathology.    Standard post-procedure care has been explained and has been included in written form within the patient's copy of Informed Consent.    INFORMED CONSENT DISCUSSION AND POST-OPERATIVE INSTRUCTIONS FOR PATIENT    I.  RATIONALE FOR PROCEDURE  I understand that a skin biopsy allows the Dermatologist to test a lesion or rash under the microscope to obtain a  "diagnosis.  It usually involves numbing the area with numbing medication and removing a small piece of skin; sometimes the area will be closed with sutures. In this specific procedure, sutures are not usually needed.  If any sutures are placed, then they are usually need to be removed in 2 weeks or less.    I understand that my Dermatologist recommends that a skin \"shave\" biopsy be performed today.  A local anesthetic, similar to the kind that a dentist uses when filling a cavity, will be injected with a very small needle into the skin area to be sampled.  The injected skin and tissue underneath \"will go to sleep” and become numb so no pain should be felt afterwards.  An instrument shaped like a tiny \"razor blade\" (shave biopsy instrument) will be used to cut a small piece of tissue and skin from the area so that a sample of tissue can be taken and examined more closely under the microscope.  A slight amount of bleeding will occur, but it will be stopped with direct pressure and a pressure bandage and any other appropriate methods.  I understands that a scar will form where the wound was created.  Surgical ointment will be applied to help protect the wound.  Sutures are not usually needed.    II.  RISKS AND POTENTIAL COMPLICATIONS   I understand the risks and potential complications of a skin biopsy include but are not limited to the following:  Bleeding  Infection  Pain  Scar/keloid  Skin discoloration  Incomplete Removal  Recurrence  Nerve Damage/Numbness/Loss of Function  Allergic Reaction to Anesthesia  Biopsies are diagnostic procedures and based on findings additional treatment or evaluation may be required  Loss or destruction of specimen resulting in no additional findings    My Dermatologist has explained to me the nature of the condition, the nature of the procedure, and the benefits to be reasonably expected compared with alternative approaches.  My Dermatologist has discussed the likelihood of major " "risks or complications of this procedure including the specific risks listed above, such as bleeding, infection, and scarring/keloid.  I understand that a scar is expected after this procedure.  I understand that my physician cannot predict if the scar will form a \"keloid,\" which extends beyond the borders of the wound that is created.  A keloid is a thick, painful, and bumpy scar.  A keloid can be difficult to treat, as it does not always respond well to therapy, which includes injecting cortisone directly into the keloid every few weeks.  While this usually reduces the pain and size of the scar, it does not eliminate it.      I understand that photographs may be taken before and after the procedure.  These will be maintained as part of the medical providers confidential records and may not be made available to me.  I further authorize the medical provider to use the photographs for teaching purposes or to illustrate scientific papers, books, or lectures if in his/her judgment, medical research, education, or science may benefit from its use.    I have had an opportunity to fully inquire about the risks and benefits of this procedure and its alternatives.   I have been given ample time and opportunity to ask questions and to seek a second opinion if I wished to do so.  I acknowledge that there have specifically been no guarantees as to the cosmetic results from the procedure.  I am aware that with any procedure there is always the possibility of an unexpected complication.    III. POST-PROCEDURAL CARE (WHAT YOU WILL NEED TO DO \"AFTER THE BIOPSY\" TO OPTIMIZE HEALING)    Keep the area clean and dry.  Try NOT to remove the bandage or get it wet for the first 24 hours.    Gently clean the area and apply surgical ointment (such as Vaseline petrolatum ointment, which is available \"over the counter\" and not a prescription) to the biopsy site for up to 2 weeks straight.  This acts to protect the wound from the outside " world.      Sutures are not usually placed in this procedure.  If any sutures were placed, return for suture removal as instructed (generally 1 week for the face, 2 weeks for the body).      Take Acetaminophen (Tylenol) for discomfort, if no contraindications.  Ibuprofen or aspirin could make bleeding worse.    Call our office immediately for signs of infection: fever, chills, increased redness, warmth, tenderness, discomfort/pain, or pus or foul smell coming from the wound.    WHAT TO DO IF THERE IS ANY BLEEDING?  If a small amount of bleeding is noticed, place a clean cloth over the area and apply firm pressure for ten minutes.  Check the wound after 10 minutes of direct pressure.  If bleeding persists, try one more time for an additional 10 minutes of direct pressure on the area.  If the bleeding becomes heavier or does not stop after the second attempt, or if you have any other questions about this procedure, then please call your [facility]'s Dermatologist by calling [phone] (SKIN).     I hereby acknowledge that I have reviewed and verified the site with my Dermatologist and have requested and authorized my Dermatologist to proceed with the procedure.    Scribe Attestation      I,:  Yasir Valerio MA am acting as a scribe while in the presence of the attending physician.:       I,:  Dalila Ramos PA-C personally performed the services described in this documentation    as scribed in my presence.:                [1]   Allergies  Allergen Reactions    Pollen Extract Allergic Rhinitis   [2]   Current Outpatient Medications:     amLODIPine (NORVASC) 10 mg tablet, TAKE 1 TABLET BY MOUTH EVERY DAY, Disp: 90 tablet, Rfl: 1    B Complex Vitamins (B-COMPLEX/B-12 PO), Take by mouth, Disp: , Rfl:     celecoxib (CeleBREX) 200 mg capsule, Take 1 capsule (200 mg total) by mouth daily PRN for pain, Disp: 30 capsule, Rfl: 0    diclofenac (VOLTAREN) 75 mg EC tablet, TAKE 1 TABLET BY MOUTH TWICE A DAY WITH FOOD AS  NEEDED FOR PAIN, Disp: 60 tablet, Rfl: 0    lisinopril (ZESTRIL) 40 mg tablet, TAKE 1 TABLET BY MOUTH EVERY DAY, Disp: 90 tablet, Rfl: 1    loratadine (CLARITIN) 10 mg tablet, Take 1 tablet by mouth as needed, Disp: , Rfl:     metFORMIN (GLUCOPHAGE) 500 mg tablet, TAKE 1 TABLET BY MOUTH TWICE A DAY WITH FOOD, Disp: 180 tablet, Rfl: 1    Multiple Vitamin (ONE-A-DAY MENS PO), Take by mouth in the morning, Disp: , Rfl:     Omega-3 Fatty Acids (FISH OIL BURP-LESS PO), Take by mouth, Disp: , Rfl:     omeprazole (PriLOSEC) 20 mg delayed release capsule, Take 20 mg by mouth in the morning., Disp: , Rfl:

## 2025-07-31 ENCOUNTER — OFFICE VISIT (OUTPATIENT)
Dept: OBGYN CLINIC | Facility: MEDICAL CENTER | Age: 70
End: 2025-07-31
Payer: COMMERCIAL

## 2025-07-31 VITALS — WEIGHT: 254 LBS | BODY MASS INDEX: 36.36 KG/M2 | HEIGHT: 70 IN

## 2025-07-31 DIAGNOSIS — M25.562 CHRONIC PAIN OF BOTH KNEES: ICD-10-CM

## 2025-07-31 DIAGNOSIS — M25.561 CHRONIC PAIN OF BOTH KNEES: ICD-10-CM

## 2025-07-31 DIAGNOSIS — M17.0 BILATERAL PRIMARY OSTEOARTHRITIS OF KNEE: Primary | ICD-10-CM

## 2025-07-31 DIAGNOSIS — G89.29 CHRONIC PAIN OF BOTH KNEES: ICD-10-CM

## 2025-07-31 PROCEDURE — 99213 OFFICE O/P EST LOW 20 MIN: CPT | Performed by: PHYSICIAN ASSISTANT

## 2025-07-31 PROCEDURE — 20610 DRAIN/INJ JOINT/BURSA W/O US: CPT | Performed by: PHYSICIAN ASSISTANT

## 2025-07-31 RX ORDER — BUPIVACAINE HYDROCHLORIDE 2.5 MG/ML
2 INJECTION, SOLUTION INFILTRATION; PERINEURAL
Status: COMPLETED | OUTPATIENT
Start: 2025-07-31 | End: 2025-07-31

## 2025-07-31 RX ORDER — TRIAMCINOLONE ACETONIDE 40 MG/ML
40 INJECTION, SUSPENSION INTRA-ARTICULAR; INTRAMUSCULAR
Status: COMPLETED | OUTPATIENT
Start: 2025-07-31 | End: 2025-07-31

## 2025-07-31 RX ADMIN — TRIAMCINOLONE ACETONIDE 40 MG: 40 INJECTION, SUSPENSION INTRA-ARTICULAR; INTRAMUSCULAR at 08:00

## 2025-07-31 RX ADMIN — BUPIVACAINE HYDROCHLORIDE 2 ML: 2.5 INJECTION, SOLUTION INFILTRATION; PERINEURAL at 08:00

## 2025-08-06 ENCOUNTER — PROCEDURE VISIT (OUTPATIENT)
Dept: DERMATOLOGY | Facility: CLINIC | Age: 70
End: 2025-08-06

## 2025-08-06 VITALS
TEMPERATURE: 97 F | SYSTOLIC BLOOD PRESSURE: 135 MMHG | BODY MASS INDEX: 35.87 KG/M2 | DIASTOLIC BLOOD PRESSURE: 79 MMHG | WEIGHT: 250 LBS

## 2025-08-06 DIAGNOSIS — C44.529 SQUAMOUS CELL CARCINOMA OF TRUNK: Primary | ICD-10-CM

## 2025-08-06 RX ORDER — MUPIROCIN 2 %
OINTMENT (GRAM) TOPICAL 3 TIMES DAILY
Qty: 30 G | Refills: 0 | Status: SHIPPED | OUTPATIENT
Start: 2025-08-06

## (undated) DEVICE — TUBING ARTHROSCOPIC WAVE  MAIN PUMP

## (undated) DEVICE — INTENDED FOR TISSUE SEPARATION, AND OTHER PROCEDURES THAT REQUIRE A SHARP SURGICAL BLADE TO PUNCTURE OR CUT.: Brand: BARD-PARKER ® CARBON RIB-BACK BLADES

## (undated) DEVICE — ACE WRAP 6 IN UNSTERILE

## (undated) DEVICE — GLOVE SRG BIOGEL 6.5

## (undated) DEVICE — BLADE SHAVER DISSECTOR 3.5MM 13CM COOLCUT

## (undated) DEVICE — NEEDLE HYPO 22G X 1-1/2 IN

## (undated) DEVICE — WEBRIL 6 IN UNSTERILE

## (undated) DEVICE — GLOVE INDICATOR PI UNDERGLOVE SZ 6.5 BLUE

## (undated) DEVICE — SCD SEQUENTIAL COMPRESSION COMFORT SLEEVE MEDIUM KNEE LENGTH: Brand: KENDALL SCD

## (undated) DEVICE — CHLORAPREP HI-LITE 26ML ORANGE

## (undated) DEVICE — 3M™ STERI-DRAPE™ U-DRAPE 1015: Brand: STERI-DRAPE™

## (undated) DEVICE — GLOVE SRG BIOGEL PI ORTHOPEDIC 7

## (undated) DEVICE — OCCLUSIVE GAUZE STRIP,3% BISMUTH TRIBROMOPHENATE IN PETROLATUM BLEND: Brand: XEROFORM

## (undated) DEVICE — GAUZE SPONGES,16 PLY: Brand: CURITY

## (undated) DEVICE — 4-PORT MANIFOLD: Brand: NEPTUNE 2

## (undated) DEVICE — BETHLEHEM UNIVERSAL  ARTHRO PK: Brand: CARDINAL HEALTH

## (undated) DEVICE — SUT MONOCRYL 3-0 PS-2 27 IN Y427H

## (undated) DEVICE — IMPERVIOUS STOCKINETTE: Brand: DEROYAL

## (undated) DEVICE — 3M™ STERI-STRIP™ REINFORCED ADHESIVE SKIN CLOSURES, R1547, 1/2 IN X 4 IN (12 MM X 100 MM), 6 STRIPS/ENVELOPE: Brand: 3M™ STERI-STRIP™

## (undated) DEVICE — PADDING CAST 6IN COTTON STRL

## (undated) DEVICE — COBAN 6 IN STERILE

## (undated) DEVICE — TUBING SUCTION 5MM X 12 FT

## (undated) DEVICE — GLOVE INDICATOR PI UNDERGLOVE SZ 7 BLUE

## (undated) DEVICE — ABDOMINAL PAD: Brand: DERMACEA